# Patient Record
Sex: MALE | Race: WHITE | NOT HISPANIC OR LATINO | Employment: OTHER | URBAN - METROPOLITAN AREA
[De-identification: names, ages, dates, MRNs, and addresses within clinical notes are randomized per-mention and may not be internally consistent; named-entity substitution may affect disease eponyms.]

---

## 2017-01-12 ENCOUNTER — OFFICE VISIT (OUTPATIENT)
Dept: INTERNAL MEDICINE | Facility: CLINIC | Age: 77
End: 2017-01-12
Payer: MEDICARE

## 2017-01-12 VITALS
HEIGHT: 70 IN | HEART RATE: 57 BPM | OXYGEN SATURATION: 98 % | WEIGHT: 204.13 LBS | TEMPERATURE: 96 F | SYSTOLIC BLOOD PRESSURE: 120 MMHG | BODY MASS INDEX: 29.22 KG/M2 | DIASTOLIC BLOOD PRESSURE: 70 MMHG

## 2017-01-12 DIAGNOSIS — I10 ESSENTIAL HYPERTENSION: ICD-10-CM

## 2017-01-12 DIAGNOSIS — G89.29 CHRONIC RIGHT-SIDED LOW BACK PAIN WITH RIGHT-SIDED SCIATICA: Primary | ICD-10-CM

## 2017-01-12 DIAGNOSIS — M54.41 CHRONIC RIGHT-SIDED LOW BACK PAIN WITH RIGHT-SIDED SCIATICA: Primary | ICD-10-CM

## 2017-01-12 PROCEDURE — 99999 PR PBB SHADOW E&M-EST. PATIENT-LVL III: CPT | Mod: PBBFAC,,, | Performed by: FAMILY MEDICINE

## 2017-01-12 PROCEDURE — 99213 OFFICE O/P EST LOW 20 MIN: CPT | Mod: PBBFAC | Performed by: FAMILY MEDICINE

## 2017-01-12 PROCEDURE — 99214 OFFICE O/P EST MOD 30 MIN: CPT | Mod: S$PBB,,, | Performed by: FAMILY MEDICINE

## 2017-01-12 NOTE — PROGRESS NOTES
Subjective:       Patient ID: Aston Avendano is a 76 y.o. male.    Chief Complaint: Annual Exam    HPI Comments: Patient presents to clinic today reporting problems with chronic back pain. He is upset that I did not refill his request for flexeril, which he eventually received from Dr. Simpson. He reports he was seeing Dr. Hanson, but had to wait too long for his last appointment and does not plan to go back. He reports having 2+ surgeries on his back with no relief. He was also upset today that I did not see him until nearly 8:20 am when his appointment was scheduled for 7:40 am. (Patient was checked in for 7:47 am, nursing staff started rooming process by at least 8:02 (that is the time vitals were entered) and I was in the room seeing him at approximately 8:20 am.) Patient is otherwise without concerns today.      Review of Systems   Constitutional: Negative for chills, fatigue, fever and unexpected weight change.   HENT: Negative for congestion, dental problem, ear pain, hearing loss, rhinorrhea and trouble swallowing.    Eyes: Negative for pain and visual disturbance.   Respiratory: Negative for cough and shortness of breath.    Cardiovascular: Negative for chest pain, palpitations and leg swelling.   Gastrointestinal: Negative for abdominal distention, abdominal pain, blood in stool, constipation, diarrhea, nausea and vomiting.   Genitourinary: Negative for difficulty urinating, scrotal swelling and testicular pain.   Musculoskeletal: Positive for back pain. Negative for arthralgias and myalgias.   Skin: Negative for rash.   Neurological: Negative for dizziness, weakness, numbness and headaches.   Hematological: Negative for adenopathy. Does not bruise/bleed easily.   Psychiatric/Behavioral: Positive for sleep disturbance. Negative for dysphoric mood. The patient is not nervous/anxious.        Objective:      Physical Exam   Constitutional: He is oriented to person, place, and time. He appears well-developed  and well-nourished. No distress.   HENT:   Head: Normocephalic and atraumatic.   Eyes: Conjunctivae and EOM are normal. Pupils are equal, round, and reactive to light. No scleral icterus.   Pulmonary/Chest: Effort normal.   Neurological: He is alert and oriented to person, place, and time. No cranial nerve deficit. Gait normal.   Psychiatric: He has a normal mood and affect.   Vitals reviewed.      Assessment:       1. Chronic right-sided low back pain with right-sided sciatica    2. Essential hypertension        Plan:   Aston MERINO was seen today for annual exam.    Diagnoses and all orders for this visit:    Chronic right-sided low back pain with right-sided sciatica  -     Ambulatory Referral to Physical Medicine Rehab    Essential hypertension  Comments:  controlled, followed by Cardiology  Orders:  -     CBC auto differential; Standing  -     TSH; Standing    - Health Maintenance reviewed/updated.

## 2017-01-12 NOTE — MR AVS SNAPSHOT
[facility]  [address] [address] [address]  Phone: [phone]  Fax: [phone]                  Aston Avendano   2017 7:40 AM   Office Visit    Description:  Male : 1940   Provider:  Quita Nielsen MD   [facility]           Reason for Visit     Annual Exam           Diagnoses this Visit        Comments    Chronic right-sided low back pain with right-sided sciatica    -  Primary            To Do List           Future Appointments        Provider Department Dept Phone    2017 8:00 AM HOME MONITOR DEVICE CHECK [facility] - Arrhythmia Procedures [phone]    2018 8:40 AM Quita Nielsen MD [facility] [phone]      Goals (5 Years of Data)     None      Follow-Up and Disposition     Return in about 1 year (around 2018), or if symptoms worsen or fail to improve, for annual wellness exam.      [facility] On Call     [facility] On Call Nurse Care Line -  Assistance  Registered nurses in the [facility] On Call Center provide clinical advisement, health education, appointment booking, and other advisory services.  Call for this free service at [phone].             Medications           Message regarding Medications     Verify the changes and/or additions to your medication regime listed below are the same as discussed with your clinician today.  If any of these changes or additions are incorrect, please notify your healthcare provider.             Verify that the below list of medications is an accurate representation of the medications you are currently taking.  If none reported, the list may be blank. If incorrect, please contact your healthcare provider. Carry this list with you in case of emergency.           Current Medications     aspirin (ECOTRIN) 81 MG EC tablet Take by mouth. 1 Tablet, Delayed Release (E.C.) Oral Every day    cevimeline (EVOXAC) 30 mg capsule Take 1 capsule (30 mg total)  "by mouth 3 (three) times daily.    chlorthalidone (HYGROTEN) 25 MG Tab TAKE 1 TABLET ONE DAY AND 1/2 TABLET THE NEXT DAY AS DIRECTED    cyclobenzaprine (FLEXERIL) 10 MG tablet Take 1 tablet (10 mg total) by mouth 3 (three) times daily as needed for Muscle spasms.    diazepam (VALIUM) 5 MG tablet TAKE 1 TABLET EVERY 12 HOURS AS NEEDED SPASMS    etodolac (LODINE) 500 MG tablet Take 1 tablet (500 mg total) by mouth 2 (two) times daily with meals.    finasteride (PROSCAR) 5 mg tablet Take 5 mg by mouth once daily.     fish oil-omega-3 fatty acids 300-1,000 mg capsule Take 2 g by mouth once daily.    losartan (COZAAR) 100 MG tablet Take 0.5 tablets (50 mg total) by mouth once daily.    methocarbamol (ROBAXIN) 750 MG Tab Take 500 mg by mouth 4 (four) times daily.    sildenafil (VIAGRA) 100 MG tablet Take 1 tablet (100 mg total) by mouth daily as needed for Erectile Dysfunction.    simvastatin (ZOCOR) 20 MG tablet Take 0.5 tablets (10 mg total) by mouth once daily.    tamsulosin (FLOMAX) 0.4 mg Cp24 Take 0.4 mg by mouth once daily.     triamcinolone acetonide 0.1% (KENALOG) 0.1 % ointment     VESICARE 10 mg tablet Take 10 mg by mouth once daily.    zolpidem (AMBIEN) 5 MG Tab     gabapentin (NEURONTIN) 600 MG tablet Take 1 tablet (600 mg total) by mouth 2 (two) times daily.    ranitidine (ZANTAC) 300 MG tablet Take 1 tablet (300 mg total) by mouth every evening.    terbinafine HCl (LAMISIL) 1 % cream Apply to affected area twice a day until rash has resolved.           Clinical Reference Information           Vital Signs - Last Recorded  Most recent update: 1/12/2017  8:04 AM by Kimberly Zambrano    BP Pulse Temp Ht Wt SpO2    120/70 (BP Location: Right arm, Patient Position: Sitting) (!) 57 96 °F (35.6 °C) 5' 10" (1.778 m) 92.6 kg (204 lb 2.3 oz) 98%    BMI                29.29 kg/m2          Blood Pressure          Most Recent Value    BP  120/70      Allergies as of 1/12/2017     Codeine      Immunizations Administered on " Date of Encounter - 1/12/2017     None      Orders Placed During Today's Visit      Normal Orders This Visit    Ambulatory Referral to Physical Medicine Rehab       Jose Martinsmarleny Sign-Up     Activating your [facility] account is as easy as 1-2-3!     1) Visit [email or web address], select Sign Up Now, enter this activation code and your date of birth, then select Next.  [email or web address]  Expires: 1/19/2017  8:39 AM      2) Create a username and password to use when you visit [facility] in the future and select a security question in case you lose your password and select Next.    3) Enter your e-mail address and click Sign Up!    Additional Information  If you have questions, please e-mail [email or web address] or call [phone] to talk to our [facility] staff. Remember, [facility] is NOT to be used for urgent needs. For medical emergencies, dial 911.

## 2017-01-18 ENCOUNTER — TELEPHONE (OUTPATIENT)
Dept: INTERNAL MEDICINE | Facility: CLINIC | Age: 77
End: 2017-01-18

## 2017-01-18 NOTE — TELEPHONE ENCOUNTER
----- Message from Ary Jonas sent at 1/18/2017  1:03 PM CST -----  Call pt at [phone]//pt say call him concerning the neurology and also he has information that he want to give you//wiliamks ht

## 2017-01-19 RX ORDER — CHLORTHALIDONE 25 MG/1
TABLET ORAL
Qty: 30 TABLET | Refills: 11 | Status: SHIPPED | OUTPATIENT
Start: 2017-01-19 | End: 2017-06-01 | Stop reason: SDUPTHER

## 2017-01-24 ENCOUNTER — OFFICE VISIT (OUTPATIENT)
Dept: PAIN MEDICINE | Facility: CLINIC | Age: 77
End: 2017-01-24
Payer: MEDICARE

## 2017-01-24 VITALS
HEART RATE: 57 BPM | HEIGHT: 70 IN | RESPIRATION RATE: 16 BRPM | WEIGHT: 204 LBS | DIASTOLIC BLOOD PRESSURE: 72 MMHG | BODY MASS INDEX: 29.2 KG/M2 | SYSTOLIC BLOOD PRESSURE: 146 MMHG

## 2017-01-24 DIAGNOSIS — M96.1 FAILED BACK SYNDROME OF LUMBAR SPINE: Primary | ICD-10-CM

## 2017-01-24 PROCEDURE — 99213 OFFICE O/P EST LOW 20 MIN: CPT | Mod: PBBFAC | Performed by: ANESTHESIOLOGY

## 2017-01-24 PROCEDURE — 99999 PR PBB SHADOW E&M-EST. PATIENT-LVL III: CPT | Mod: PBBFAC,,, | Performed by: ANESTHESIOLOGY

## 2017-01-24 PROCEDURE — 99203 OFFICE O/P NEW LOW 30 MIN: CPT | Mod: S$PBB,,, | Performed by: ANESTHESIOLOGY

## 2017-01-24 NOTE — PROGRESS NOTES
"Chief Pain Complaint:  Low Back Pain, Bilateral Leg Pain    History of Present Illness:  This patient is a 77 y.o. male who presents today complaining of the above noted pain/s. The patient describes the pain as follows.    - duration of pain: 8 years   - timing: constant   - character: aching, numbing  - radiating, dermatomal: extends into bilateral lower extremities, perhaps along L5 at times  - antecedent trauma, prior spinal surgery: no prior trauma, prior lumbar surgery x 3 (Dr. Hanson)  - pertinent negatives: No fever, No chills, No weight loss, No bladder dysfunction, No bowel dysfunction, No saddle anesthesia  - pertinent positives: generalized nonspecific Lower Extremity weakness bilaterally    - medications, other therapies tried (physical therapy, injections):     >> gabapentin, flexeril, Norco, percocet, tramadol    >> Has previously undergone Physical Therapy    >> Has previously undergone spinal injection/s      Imaging / Labs / Studies (reviewed on 1/24/2017): none for review      Review of Systems:  CONSTITUTIONAL: patient denies any fever, chills, or weight loss  SKIN: patient denies any rash or itching  RESPIRATORY: patient denies having any shortness of breath  GASTROINTESTINAL: patient reports constipation  GENITOURINARY: patient denies having any abnormal bladder function    MUSCULOSKELETAL:  - patient complains of the above noted pain/s (see chief pain complaint)    NEUROLOGICAL:   - pain as above  - strength in Lower extremities is decreased, BILATERALLY  - sensation in Lower extremities is abnormal, BILATERALLY  - patient denies any loss of bowel or bladder control      PSYCHIATRIC: patient denies any change in mood      Physical Exam:  Visit Vitals    BP (!) 146/72 (BP Location: Right arm, Patient Position: Sitting, BP Method: Automatic)    Pulse (!) 57    Resp 16    Ht 5' 10" (1.778 m)    Wt 92.5 kg (204 lb)    BMI 29.27 kg/m2    (reviewed on 1/24/2017)  General: alert and oriented, " in no apparent distress  Gait: antalgic gait  Skin: No rashes, No discoloration, No obvious lesions  HEENT: EOMI  Respiratory: respirations nonlabored       Psych:  Mood and affect is appropriate      Assessment:  Failed Back Surgery Syndrome      Plan:  Patient presents complaining of chronic low back and extremity pain.  Patient has had this pain for years.  Patient has undergone 3 lumbar spine surgeries.  Patient has followed with Dr. Hanson.  Patient has been exposed to pain medication, physical therapy, and spinal injections with marginal results.  I discussed treatment options and inform the patient that I unfortunately do not offer any unique treatments, nothing that he has not already been exposed to.  I will request records from Dr. Hanson.  Patient can follow up with me as needed.  Imaging / studies reviewed, detailed above.  I discussed in detail the risks, benefits, and alternatives to any and all potential treatment options.  All questions and concerns were fully addressed today in clinic.      >>Pain Disability Index:  1/24/2017 :: 79    >>Opioid Risk Tool:  1/24/2017 :: 0

## 2017-01-24 NOTE — LETTER
January 24, 2017      Quita Nielsen MD  [address] [place] [address] [address] [address]           [facility]  [address] [place] [address] [address] [place] [address]  Phone: [phone]  Fax: [phone]          Patient: Aston Avendano   MR Number: [id number]   YOB: 1940   Date of Visit: 1/24/2017       Dear Dr. Quita Nielsen:    Thank you for referring Aston Avendano to me for evaluation. Attached you will find relevant portions of my assessment and plan of care.    If you have questions, please do not hesitate to call me. I look forward to following Aston Avendano along with you.    Sincerely,    Rodrick Snow MD    Enclosure  CC:  No Recipients    If you would like to receive this communication electronically, please contact [email or web address] or [phone] to request more information on [employer] access.    For providers and/or their staff who would like to refer a patient to [facility], please contact us through our one-stop-shop provider referral line, [facility], at [phone].    If you feel you have received this communication in error or would no longer like to receive these types of communications, please e-mail [email or web address]

## 2017-01-30 ENCOUNTER — CLINICAL SUPPORT (OUTPATIENT)
Dept: CARDIOLOGY | Facility: CLINIC | Age: 77
End: 2017-01-30
Payer: MEDICARE

## 2017-01-30 DIAGNOSIS — I49.5 SINUS NODE DYSFUNCTION: ICD-10-CM

## 2017-01-30 DIAGNOSIS — R55 SYNCOPE AND COLLAPSE: ICD-10-CM

## 2017-01-30 PROCEDURE — 93296 REM INTERROG EVL PM/IDS: CPT | Mod: PBBFAC,PO | Performed by: INTERNAL MEDICINE

## 2017-01-30 PROCEDURE — 93294 REM INTERROG EVL PM/LDLS PM: CPT | Mod: ,,, | Performed by: INTERNAL MEDICINE

## 2017-01-31 ENCOUNTER — TELEPHONE (OUTPATIENT)
Dept: CARDIOLOGY | Facility: CLINIC | Age: 77
End: 2017-01-31

## 2017-01-31 NOTE — TELEPHONE ENCOUNTER
----- Message from Elvia Sanches sent at 1/31/2017  8:57 AM CST -----  Contact: pt  Pt would like to change his appointment to the [facility] also he would like someone give kalyan call..[phone] (home)

## 2017-01-31 NOTE — TELEPHONE ENCOUNTER
Pt states he just found reminder letter about remote check for Jan 30, 2017.  Wants to know if he could r/s that appt for [facility].  Educated pt about remote monitoring and that he did not have to come to clinic for those appts.  Advised him that transmission came through, no problems, awaiting confirmation by cardiologist.  Pt verbalized understanding.

## 2017-02-13 ENCOUNTER — OFFICE VISIT (OUTPATIENT)
Dept: OPHTHALMOLOGY | Facility: CLINIC | Age: 77
End: 2017-02-13
Payer: MEDICARE

## 2017-02-13 DIAGNOSIS — H02.056 TRICHIASIS OF EYELID, LEFT: ICD-10-CM

## 2017-02-13 DIAGNOSIS — H02.836 DERMATOCHALASIS OF BOTH EYELIDS: ICD-10-CM

## 2017-02-13 DIAGNOSIS — H02.833 DERMATOCHALASIS OF BOTH EYELIDS: ICD-10-CM

## 2017-02-13 DIAGNOSIS — H57.819 BROW PTOSIS: ICD-10-CM

## 2017-02-13 DIAGNOSIS — H04.123 DRY EYE SYNDROME, BILATERAL: ICD-10-CM

## 2017-02-13 DIAGNOSIS — H40.1131 PRIMARY OPEN ANGLE GLAUCOMA OF BOTH EYES, MILD STAGE: ICD-10-CM

## 2017-02-13 DIAGNOSIS — H02.053: Primary | ICD-10-CM

## 2017-02-13 DIAGNOSIS — H16.9 KERATITIS, BILATERAL: ICD-10-CM

## 2017-02-13 DIAGNOSIS — Z98.890 HISTORY OF RADIAL KERATOTOMY: ICD-10-CM

## 2017-02-13 PROCEDURE — 92012 INTRM OPH EXAM EST PATIENT: CPT | Mod: S$PBB,,, | Performed by: OPTOMETRIST

## 2017-02-13 PROCEDURE — 99999 PR PBB SHADOW E&M-EST. PATIENT-LVL II: CPT | Mod: PBBFAC,,, | Performed by: OPTOMETRIST

## 2017-02-13 PROCEDURE — 99212 OFFICE O/P EST SF 10 MIN: CPT | Mod: PBBFAC | Performed by: OPTOMETRIST

## 2017-02-13 NOTE — PROGRESS NOTES
HPI     Since Friday seems like a lash is irritating right eye.  Patient has used   a bottle and a half of Systane ultra eye drops  since Friday.       Last edited by Jacklyn Srivastava on 2/13/2017  1:58 PM.         Assessment /Plan     For exam results, see Encounter Report.    Trichiasis of eyelid, right    Trichiasis of eyelid, left    Brow ptosis    Dermatochalasis of both eyelids    Dry eye syndrome, bilateral    Keratitis, bilateral    Primary open angle glaucoma of both eyes, mild stage    History of radial keratotomy      Epilated lashes without incident    RTC prn, sees Dr Yee for glaucoma care

## 2017-03-06 DIAGNOSIS — I10 ESSENTIAL HYPERTENSION: ICD-10-CM

## 2017-03-06 RX ORDER — LOSARTAN POTASSIUM 100 MG/1
TABLET ORAL
Qty: 90 TABLET | Refills: 3 | Status: SHIPPED | OUTPATIENT
Start: 2017-03-06 | End: 2018-12-10 | Stop reason: SDUPTHER

## 2017-04-17 ENCOUNTER — OFFICE VISIT (OUTPATIENT)
Dept: OPHTHALMOLOGY | Facility: CLINIC | Age: 77
End: 2017-04-17
Payer: MEDICARE

## 2017-04-17 DIAGNOSIS — H02.059 TRICHIASIS OF EYELID WITHOUT ENTROPION, UNSPECIFIED LATERALITY: Primary | ICD-10-CM

## 2017-04-17 PROCEDURE — 67820 REVISE EYELASHES: CPT | Mod: PBBFAC | Performed by: OPTOMETRIST

## 2017-04-17 PROCEDURE — 99212 OFFICE O/P EST SF 10 MIN: CPT | Mod: PBBFAC | Performed by: OPTOMETRIST

## 2017-04-17 PROCEDURE — 67820 REVISE EYELASHES: CPT | Mod: S$PBB,RT,, | Performed by: OPTOMETRIST

## 2017-04-17 PROCEDURE — 92012 INTRM OPH EXAM EST PATIENT: CPT | Mod: 25,S$PBB,, | Performed by: OPTOMETRIST

## 2017-04-17 PROCEDURE — 99999 PR PBB SHADOW E&M-EST. PATIENT-LVL II: CPT | Mod: PBBFAC,,, | Performed by: OPTOMETRIST

## 2017-05-01 ENCOUNTER — CLINICAL SUPPORT (OUTPATIENT)
Dept: CARDIOLOGY | Facility: CLINIC | Age: 77
End: 2017-05-01
Payer: MEDICARE

## 2017-05-01 DIAGNOSIS — R55 SYNCOPE AND COLLAPSE: ICD-10-CM

## 2017-05-01 DIAGNOSIS — I49.5 SINUS NODE DYSFUNCTION: ICD-10-CM

## 2017-05-01 PROCEDURE — 93296 REM INTERROG EVL PM/IDS: CPT | Mod: PBBFAC,PO | Performed by: INTERNAL MEDICINE

## 2017-05-01 PROCEDURE — 93294 REM INTERROG EVL PM/LDLS PM: CPT | Mod: ,,, | Performed by: INTERNAL MEDICINE

## 2017-05-29 ENCOUNTER — OFFICE VISIT (OUTPATIENT)
Dept: OPHTHALMOLOGY | Facility: CLINIC | Age: 77
End: 2017-05-29
Payer: MEDICARE

## 2017-05-29 DIAGNOSIS — H02.059 TRICHIASIS OF EYELID WITHOUT ENTROPION, UNSPECIFIED LATERALITY: Primary | ICD-10-CM

## 2017-05-29 PROCEDURE — 99212 OFFICE O/P EST SF 10 MIN: CPT | Mod: PBBFAC | Performed by: OPTOMETRIST

## 2017-05-29 PROCEDURE — 99999 PR PBB SHADOW E&M-EST. PATIENT-LVL II: CPT | Mod: PBBFAC,,, | Performed by: OPTOMETRIST

## 2017-05-29 PROCEDURE — 67820 REVISE EYELASHES: CPT | Mod: E3,S$PBB,, | Performed by: OPTOMETRIST

## 2017-05-29 PROCEDURE — 67820 REVISE EYELASHES: CPT | Mod: PBBFAC | Performed by: OPTOMETRIST

## 2017-05-29 PROCEDURE — 92012 INTRM OPH EXAM EST PATIENT: CPT | Mod: 25,S$PBB,, | Performed by: OPTOMETRIST

## 2017-05-29 NOTE — PROGRESS NOTES
HPI     Patient states he need lashes pull both eyes. Lashes gave bother patient   all weekend long.    Last edited by Jacklyn Srivastava on 5/29/2017  1:47 PM. (History)            Assessment /Plan     For exam results, see Encounter Report.    Trichiasis of eyelid without entropion, unspecified laterality      Epilated lash RUL    RTC prn

## 2017-06-01 ENCOUNTER — OFFICE VISIT (OUTPATIENT)
Dept: CARDIOLOGY | Facility: CLINIC | Age: 77
End: 2017-06-01
Payer: MEDICARE

## 2017-06-01 VITALS
HEIGHT: 70 IN | SYSTOLIC BLOOD PRESSURE: 118 MMHG | BODY MASS INDEX: 29.22 KG/M2 | DIASTOLIC BLOOD PRESSURE: 66 MMHG | HEART RATE: 56 BPM | WEIGHT: 204.13 LBS

## 2017-06-01 DIAGNOSIS — I65.23 BILATERAL CAROTID ARTERY STENOSIS: ICD-10-CM

## 2017-06-01 DIAGNOSIS — Z95.0 CARDIAC PACEMAKER: Chronic | ICD-10-CM

## 2017-06-01 DIAGNOSIS — I70.0 ABDOMINAL AORTIC ATHEROSCLEROSIS: Chronic | ICD-10-CM

## 2017-06-01 DIAGNOSIS — K21.9 GASTROESOPHAGEAL REFLUX DISEASE WITHOUT ESOPHAGITIS: Chronic | ICD-10-CM

## 2017-06-01 DIAGNOSIS — I10 HTN (HYPERTENSION), BENIGN: Primary | Chronic | ICD-10-CM

## 2017-06-01 DIAGNOSIS — E78.5 HYPERLIPIDEMIA, UNSPECIFIED HYPERLIPIDEMIA TYPE: Chronic | ICD-10-CM

## 2017-06-01 DIAGNOSIS — M54.9 CHRONIC BACK PAIN GREATER THAN 3 MONTHS DURATION: Chronic | ICD-10-CM

## 2017-06-01 DIAGNOSIS — G89.29 CHRONIC BACK PAIN GREATER THAN 3 MONTHS DURATION: Chronic | ICD-10-CM

## 2017-06-01 PROCEDURE — 99999 PR PBB SHADOW E&M-EST. PATIENT-LVL III: CPT | Mod: PBBFAC,,, | Performed by: INTERNAL MEDICINE

## 2017-06-01 PROCEDURE — 99213 OFFICE O/P EST LOW 20 MIN: CPT | Mod: S$PBB,,, | Performed by: INTERNAL MEDICINE

## 2017-06-01 PROCEDURE — 99213 OFFICE O/P EST LOW 20 MIN: CPT | Mod: PBBFAC | Performed by: INTERNAL MEDICINE

## 2017-06-01 RX ORDER — IBUPROFEN 800 MG/1
800 TABLET ORAL EVERY 8 HOURS
Refills: 1 | COMMUNITY
Start: 2017-03-06

## 2017-06-01 RX ORDER — TRAMADOL HYDROCHLORIDE 50 MG/1
50 TABLET ORAL
COMMUNITY
End: 2018-01-25

## 2017-06-01 RX ORDER — MYCOPHENOLATE MOFETIL 500 MG/1
TABLET ORAL
COMMUNITY
Start: 2017-02-14 | End: 2019-05-02

## 2017-06-01 RX ORDER — VARDENAFIL HYDROCHLORIDE 20 MG/1
20 TABLET ORAL
COMMUNITY
Start: 2017-02-01 | End: 2018-02-01

## 2017-06-01 RX ORDER — OXYBUTYNIN CHLORIDE 10 MG/1
TABLET, EXTENDED RELEASE ORAL
Refills: 11 | COMMUNITY
Start: 2017-03-28 | End: 2018-08-27

## 2017-06-01 RX ORDER — OXYCODONE AND ACETAMINOPHEN 7.5; 325 MG/1; MG/1
1 TABLET ORAL EVERY 8 HOURS PRN
Refills: 0 | COMMUNITY
Start: 2017-04-27 | End: 2019-06-25

## 2017-06-01 RX ORDER — DAPSONE 25 MG/1
TABLET ORAL
COMMUNITY
Start: 2017-04-25 | End: 2019-05-02

## 2017-06-01 NOTE — PROGRESS NOTES
Subjective:   Patient ID:  Aston Avendano is a 77 y.o. male who presents for follow up of Hypertension (Patient presents to clinic today for 6 month f/u.)      HPI  A 78 yo male with h/o pacer htn is here for f/u he is having chronic back pain needing another surgery otherwise he has no change  In status he is limited in his mobility.he has fallen. His last surgery was 1 year ago he had no complications.  Past Medical History:   Diagnosis Date    Arthritis     Cardiac syncope 2011    s/p pacemaker     Colon cancer screening 12/5/2016    Glaucoma     History of carotid artery stenosis 9/26/2013    No stenosis on scan done 11/2014. S/p CEA        Past Surgical History:   Procedure Laterality Date    CARDIAC PACEMAKER PLACEMENT      CARDIAC PACEMAKER PLACEMENT      CAROTID ENDARTERECTOMY      CATARACT EXTRACTION W/  INTRAOCULAR LENS IMPLANT  OD 1/5/12    CATARACT EXTRACTION W/  INTRAOCULAR LENS IMPLANT  OS date unknown    COLONOSCOPY N/A 12/5/2016    Procedure: COLONOSCOPY;  Surgeon: Kary Kohler MD;  Location: [facility];  Service: Endoscopy;  Laterality: N/A;    LUMBAR FUSION  2013    RHIZOTOMY W/ RADIOFREQUENCY ABLATION  2010       Social History   Substance Use Topics    Smoking status: Former Smoker     Packs/day: 0.25     Years: 50.00     Quit date: 5/1/2006    Smokeless tobacco: Never Used    Alcohol use Yes      Comment: OCC       Family History   Problem Relation Age of Onset    Hypertension Mother     Hypertension Father        Current Outpatient Prescriptions   Medication Sig    aspirin (ECOTRIN) 81 MG EC tablet Take by mouth. 1 Tablet, Delayed Release (E.C.) Oral Every day    chlorthalidone (HYGROTEN) 25 MG Tab TAKE 1 TABLET ONE DAY AND 1/2 TABLET THE NEXT DAY AS DIRECTED    dapsone 25 MG Tab 1 po bid    diazepam (VALIUM) 5 MG tablet TAKE 1 TABLET EVERY 12 HOURS AS NEEDED SPASMS    etodolac (LODINE) 500 MG tablet Take 1 tablet (500 mg total) by mouth 2 (two) times daily with meals.     finasteride (PROSCAR) 5 mg tablet Take 5 mg by mouth once daily.     fish oil-omega-3 fatty acids 300-1,000 mg capsule Take 2 g by mouth once daily.    gabapentin (NEURONTIN) 600 MG tablet Take 1 tablet (600 mg total) by mouth 2 (two) times daily.    ibuprofen (ADVIL,MOTRIN) 800 MG tablet Take 800 mg by mouth every 8 (eight) hours.    losartan (COZAAR) 100 MG tablet TAKE 1 TABLET (100 MG TOTAL) BY MOUTH ONCE DAILY.    mycophenolate (CELLCEPT) 500 mg Tab 3 in the AM and 3 in the PM    oxybutynin (DITROPAN-XL) 10 MG 24 hr tablet TAKE 1 TABLET (10 MG TOTAL) BY MOUTH DAILY.    oxycodone-acetaminophen (PERCOCET) 7.5-325 mg per tablet Take 1 tablet by mouth every 8 (eight) hours as needed.    ranitidine (ZANTAC) 300 MG tablet Take 1 tablet (300 mg total) by mouth every evening.    simvastatin (ZOCOR) 20 MG tablet Take 0.5 tablets (10 mg total) by mouth once daily.    tamsulosin (FLOMAX) 0.4 mg Cp24 Take 0.4 mg by mouth once daily.     vardenafil (LEVITRA) 20 MG tablet Take 20 mg by mouth.    VESICARE 10 mg tablet Take 10 mg by mouth once daily.    zolpidem (AMBIEN) 5 MG Tab     cyclobenzaprine (FLEXERIL) 10 MG tablet Take 1 tablet (10 mg total) by mouth 3 (three) times daily as needed for Muscle spasms.    methocarbamol (ROBAXIN) 750 MG Tab Take 500 mg by mouth 4 (four) times daily.    sildenafil (VIAGRA) 100 MG tablet Take 1 tablet (100 mg total) by mouth daily as needed for Erectile Dysfunction.    tramadol (ULTRAM) 50 mg tablet Take 50 mg by mouth.     No current facility-administered medications for this visit.      Current Outpatient Prescriptions on File Prior to Visit   Medication Sig    aspirin (ECOTRIN) 81 MG EC tablet Take by mouth. 1 Tablet, Delayed Release (E.C.) Oral Every day    chlorthalidone (HYGROTEN) 25 MG Tab TAKE 1 TABLET ONE DAY AND 1/2 TABLET THE NEXT DAY AS DIRECTED    diazepam (VALIUM) 5 MG tablet TAKE 1 TABLET EVERY 12 HOURS AS NEEDED SPASMS    etodolac (LODINE) 500 MG tablet  Take 1 tablet (500 mg total) by mouth 2 (two) times daily with meals.    finasteride (PROSCAR) 5 mg tablet Take 5 mg by mouth once daily.     fish oil-omega-3 fatty acids 300-1,000 mg capsule Take 2 g by mouth once daily.    gabapentin (NEURONTIN) 600 MG tablet Take 1 tablet (600 mg total) by mouth 2 (two) times daily.    losartan (COZAAR) 100 MG tablet TAKE 1 TABLET (100 MG TOTAL) BY MOUTH ONCE DAILY.    ranitidine (ZANTAC) 300 MG tablet Take 1 tablet (300 mg total) by mouth every evening.    simvastatin (ZOCOR) 20 MG tablet Take 0.5 tablets (10 mg total) by mouth once daily.    tamsulosin (FLOMAX) 0.4 mg Cp24 Take 0.4 mg by mouth once daily.     VESICARE 10 mg tablet Take 10 mg by mouth once daily.    zolpidem (AMBIEN) 5 MG Tab     [DISCONTINUED] chlorthalidone (HYGROTEN) 25 MG Tab TAKE 1 TABLET ONE DAY AND 1/2 TABLET THE NEXT DAY AS DIRECTED    cyclobenzaprine (FLEXERIL) 10 MG tablet Take 1 tablet (10 mg total) by mouth 3 (three) times daily as needed for Muscle spasms.    methocarbamol (ROBAXIN) 750 MG Tab Take 500 mg by mouth 4 (four) times daily.    sildenafil (VIAGRA) 100 MG tablet Take 1 tablet (100 mg total) by mouth daily as needed for Erectile Dysfunction.    [DISCONTINUED] cevimeline (EVOXAC) 30 mg capsule Take 1 capsule (30 mg total) by mouth 3 (three) times daily.    [DISCONTINUED] terbinafine HCl (LAMISIL) 1 % cream Apply to affected area twice a day until rash has resolved.    [DISCONTINUED] triamcinolone acetonide 0.1% (KENALOG) 0.1 % ointment      No current facility-administered medications on file prior to visit.      Review of patient's allergies indicates:   Allergen Reactions    Codeine Itching    Oxycodone Itching     Pt states it makes his nose itch and he does not like it       ROS  Constitution: Negative for weakness and malaise/fatigue.   HENT: Negative for headaches.   Eyes: Positive for visual disturbance. Negative for blurred vision.   Cardiovascular: Negative for  "chest pain, claudication, cyanosis, dyspnea on exertion, irregular heartbeat, leg swelling, near-syncope, orthopnea, palpitations and paroxysmal nocturnal dyspnea.   Respiratory: Negative for cough, hemoptysis and shortness of breath.   Hematologic/Lymphatic: Negative for bleeding problem. Does not bruise/bleed easily.   Skin: Negative for dry skin and itching.   Musculoskeletal: Positive for arthritis, back pain and joint pain. Negative for falls, muscle weakness and myalgias.   Gastrointestinal: Negative for abdominal pain, diarrhea, heartburn, hematemesis, hematochezia and melena.   Genitourinary: Positive for nocturia. Negative for flank pain and hematuria.   Neurological: Positive for loss of balance, numbness and paresthesias. Negative for dizziness, focal weakness, light-headedness and seizures.   Psychiatric/Behavioral: Negative for altered mental status and memory loss. The patient is not nervous/anxious.   Allergic/Immunologic: Negative for hives.   Objective:   Physical Exam  Vitals:    06/01/17 0915   BP: 118/66   Pulse: (!) 56   Weight: 92.6 kg (204 lb 2.3 oz)   Height: 5' 10" (1.778 m)   Constitutional: He is oriented to person, place, and time. He appears well-developed and well-nourished. No distress.   HENT:   Head: Normocephalic and atraumatic.   Eyes: EOM are normal. Pupils are equal, round, and reactive to light. Left eye exhibits no discharge.   Neck: Normal range of motion. Neck supple. No JVD present.   Scar cea well healed.   Cardiovascular: Normal rate, regular rhythm, normal heart sounds and intact distal pulses. Exam reveals no gallop and no friction rub.   No murmur heard.  Pulmonary/Chest: Effort normal and breath sounds normal. No respiratory distress. He has no wheezes. He has no rales. He exhibits no tenderness.   Pacer site well healed.   Abdominal: Soft. Bowel sounds are normal. He exhibits no distension. There is no tenderness.   Musculoskeletal: Normal range of motion. He exhibits " no edema.   Varicose veins bilateral.   Neurological: He is alert and oriented to person, place, and time. No cranial nerve deficit.   Skin: Skin is warm and dry. No rash noted. He is not diaphoretic. No erythema.   Psychiatric: He has a normal mood and affect. His behavior is normal.  Lab Results   Component Value Date    CHOL 132 05/01/2015    CHOL 142 04/24/2014    CHOL 114 (L) 09/23/2013     Lab Results   Component Value Date    HDL 46 05/01/2015    HDL 42 04/24/2014    HDL 37 (L) 09/23/2013     Lab Results   Component Value Date    LDLCALC 76.2 05/01/2015    LDLCALC 85.6 04/24/2014    LDLCALC 63.8 09/23/2013     Lab Results   Component Value Date    TRIG 49 05/01/2015    TRIG 72 04/24/2014    TRIG 66 09/23/2013     Lab Results   Component Value Date    CHOLHDL 34.8 05/01/2015    CHOLHDL 29.6 04/24/2014    CHOLHDL 32.5 09/23/2013       Chemistry        Component Value Date/Time     12/30/2015 0912    K 4.0 12/30/2015 0912     12/30/2015 0912    CO2 27 12/30/2015 0912    BUN 13 12/30/2015 0912    CREATININE 1.0 12/30/2015 0912    GLU 86 12/30/2015 0912        Component Value Date/Time    CALCIUM 9.2 12/30/2015 0912    ALKPHOS 60 05/01/2015 0810    AST 20 05/01/2015 0810    ALT 26 05/01/2015 0810    BILITOT 0.5 05/01/2015 0810          Lab Results   Component Value Date    TSH 1.53 02/15/2011     Lab Results   Component Value Date    INR 1.1 07/22/2009     Lab Results   Component Value Date    WBC 5.74 12/30/2015    HGB 15.5 12/30/2015    HCT 44.2 12/30/2015    MCV 91 12/30/2015     (L) 12/30/2015     BMP  Sodium   Date Value Ref Range Status   12/30/2015 140 136 - 145 mmol/L Final     Potassium   Date Value Ref Range Status   12/30/2015 4.0 3.5 - 5.1 mmol/L Final     Chloride   Date Value Ref Range Status   12/30/2015 105 95 - 110 mmol/L Final     CO2   Date Value Ref Range Status   12/30/2015 27 23 - 29 mmol/L Final     BUN, Bld   Date Value Ref Range Status   12/30/2015 13 8 - 23 mg/dL Final      Creatinine   Date Value Ref Range Status   12/30/2015 1.0 0.5 - 1.4 mg/dL Final     Calcium   Date Value Ref Range Status   12/30/2015 9.2 8.7 - 10.5 mg/dL Final     Anion Gap   Date Value Ref Range Status   12/30/2015 8 8 - 16 mmol/L Final     eGFR if    Date Value Ref Range Status   12/30/2015 >60.0 >60 mL/min/1.73 m^2 Final     eGFR if non    Date Value Ref Range Status   12/30/2015 >60.0 >60 mL/min/1.73 m^2 Final     Comment:     Calculation used to obtain the estimated glomerular filtration  rate (eGFR) is the CKD-EPI equation. Since race is unknown   in our information system, the eGFR values for   -American and Non--American patients are given   for each creatinine result.       CrCl cannot be calculated (Patient's most recent sCr result is older than the maximum 14 days allowed.).    Assessment:     1. HTN (hypertension), benign    2. Hyperlipidemia, unspecified hyperlipidemia type    3. Cardiac pacemaker    4. Gastroesophageal reflux disease without esophagitis    5. Chronic back pain greater than 3 months duration    6. Abdominal aortic atherosclerosis - seen on AAA screening scan    7. Bilateral carotid artery stenosis      Stable clinically needing another surgery ok to proceed.  Plan:   Proceed with surgery at moderate risk.  F/u in 6 months.

## 2017-06-10 DIAGNOSIS — E78.5 DYSLIPIDEMIA, GOAL LDL BELOW 100: ICD-10-CM

## 2017-06-10 DIAGNOSIS — I70.0 ABDOMINAL AORTIC ATHEROSCLEROSIS: ICD-10-CM

## 2017-06-10 DIAGNOSIS — I65.29 STENOSIS OF CAROTID ARTERY: ICD-10-CM

## 2017-06-10 RX ORDER — SIMVASTATIN 20 MG/1
TABLET, FILM COATED ORAL
Qty: 45 TABLET | Refills: 1 | Status: SHIPPED | OUTPATIENT
Start: 2017-06-10 | End: 2019-07-29 | Stop reason: SDUPTHER

## 2017-06-12 ENCOUNTER — TELEPHONE (OUTPATIENT)
Dept: CARDIOLOGY | Facility: CLINIC | Age: 77
End: 2017-06-12

## 2017-06-12 NOTE — TELEPHONE ENCOUNTER
----- Message from Susanna Pittman sent at 6/12/2017  2:25 PM CDT -----  Contact: [facility]/Dr Adryan Tuttle   States he was just seen on 6/1 and he needs a surgery clearance. States he surgery in on 7/18. Please call [facility] at [phone]. Thank you

## 2017-07-14 ENCOUNTER — OFFICE VISIT (OUTPATIENT)
Dept: OPHTHALMOLOGY | Facility: CLINIC | Age: 77
End: 2017-07-14
Payer: MEDICARE

## 2017-07-14 DIAGNOSIS — H16.9 KERATITIS, BILATERAL: ICD-10-CM

## 2017-07-14 DIAGNOSIS — H02.059 TRICHIASIS OF EYELID WITHOUT ENTROPION, UNSPECIFIED LATERALITY: Primary | ICD-10-CM

## 2017-07-14 DIAGNOSIS — Z98.890 HISTORY OF RADIAL KERATOTOMY: ICD-10-CM

## 2017-07-14 PROCEDURE — 99999 PR PBB SHADOW E&M-EST. PATIENT-LVL II: CPT | Mod: PBBFAC,,, | Performed by: OPTOMETRIST

## 2017-07-14 PROCEDURE — 67820 REVISE EYELASHES: CPT | Mod: PBBFAC | Performed by: OPTOMETRIST

## 2017-07-14 PROCEDURE — 92012 INTRM OPH EXAM EST PATIENT: CPT | Mod: 25,S$PBB,, | Performed by: OPTOMETRIST

## 2017-07-14 PROCEDURE — 67820 REVISE EYELASHES: CPT | Mod: S$PBB,LT,, | Performed by: OPTOMETRIST

## 2017-07-14 PROCEDURE — 99212 OFFICE O/P EST SF 10 MIN: CPT | Mod: PBBFAC,25 | Performed by: OPTOMETRIST

## 2017-07-14 NOTE — PROGRESS NOTES
HPI     Lashes in left eye need to be pulled. Left eye pain today. Pain scale   7-8.  HX of Trichasis    Last edited by Chandana Gallegos, OD on 7/14/2017 10:42 AM. (History)            Assessment /Plan     For exam results, see Encounter Report.    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    Continue AT use prn    RTC per [facility] for glaucoma eval

## 2017-07-20 ENCOUNTER — OFFICE VISIT (OUTPATIENT)
Dept: OPHTHALMOLOGY | Facility: CLINIC | Age: 77
End: 2017-07-20
Payer: MEDICARE

## 2017-07-20 DIAGNOSIS — H18.30 CORNEAL EPITHELIAL DEFECT: Primary | ICD-10-CM

## 2017-07-20 PROCEDURE — 99212 OFFICE O/P EST SF 10 MIN: CPT | Mod: PBBFAC | Performed by: OPTOMETRIST

## 2017-07-20 PROCEDURE — 92012 INTRM OPH EXAM EST PATIENT: CPT | Mod: S$PBB,,, | Performed by: OPTOMETRIST

## 2017-07-20 PROCEDURE — 99999 PR PBB SHADOW E&M-EST. PATIENT-LVL II: CPT | Mod: PBBFAC,,, | Performed by: OPTOMETRIST

## 2017-07-20 RX ORDER — NEOMYCIN SULFATE, POLYMYXIN B SULFATE, AND DEXAMETHASONE 3.5; 10000; 1 MG/G; [USP'U]/G; MG/G
OINTMENT OPHTHALMIC EVERY 6 HOURS
Qty: 1 TUBE | Refills: 3 | Status: SHIPPED | OUTPATIENT
Start: 2017-07-20 | End: 2017-11-27

## 2017-07-20 NOTE — PROGRESS NOTES
HPI     Lashes in left eye need to be pulled. Left eye pain today. Pain scale 10   today   HX of Trichasis    Last edited by Jacklyn Srivastava on 7/20/2017  1:22 PM. (History)            Assessment /Plan     For exam results, see Encounter Report.    Corneal epithelial defect    Other orders  -     neomycin-polymyxin-dexamethasone (DEXACINE) 3.5 mg/g-10,000 unit/g-0.1 % Oint; Place into both eyes every 6 (six) hours. Please call for alternative if not in stock today.  Dispense: 1 Tube; Refill: 3      No trichiasis present       Will treat epi defect with maxitrol marcio     RTC prn worsens

## 2017-07-24 ENCOUNTER — OFFICE VISIT (OUTPATIENT)
Dept: OPHTHALMOLOGY | Facility: CLINIC | Age: 77
End: 2017-07-24
Payer: MEDICARE

## 2017-07-24 DIAGNOSIS — H16.9 KERATITIS, BILATERAL: ICD-10-CM

## 2017-07-24 DIAGNOSIS — H02.059 TRICHIASIS OF EYELID WITHOUT ENTROPION, UNSPECIFIED LATERALITY: ICD-10-CM

## 2017-07-24 DIAGNOSIS — H04.123 DRY EYE SYNDROME, BILATERAL: Primary | ICD-10-CM

## 2017-07-24 PROCEDURE — 67820 REVISE EYELASHES: CPT | Mod: PBBFAC,RT | Performed by: OPHTHALMOLOGY

## 2017-07-24 PROCEDURE — 92012 INTRM OPH EXAM EST PATIENT: CPT | Mod: 25,S$PBB,, | Performed by: OPHTHALMOLOGY

## 2017-07-24 PROCEDURE — 99999 PR PBB SHADOW E&M-EST. PATIENT-LVL II: CPT | Mod: PBBFAC,,, | Performed by: OPHTHALMOLOGY

## 2017-07-24 PROCEDURE — 67820 REVISE EYELASHES: CPT | Mod: S$PBB,RT,, | Performed by: OPHTHALMOLOGY

## 2017-07-24 PROCEDURE — 99212 OFFICE O/P EST SF 10 MIN: CPT | Mod: PBBFAC,25 | Performed by: OPHTHALMOLOGY

## 2017-07-24 NOTE — PROGRESS NOTES
HPI     Dry Eye    Additional comments: SEVERE KERATOPATHY MAXITROL MARCIO QID OU           Comments   Patient saw TRF  4 days ago and same diagnosed him with severe keratopathy   with trichiasis TRF  Put patient on maxitrol marcio  qid ou, and patient   states it is not better.    COAG NO MEDS    SLT OS 12/28/15    PCIOL OD 1/5/12  ACIOL OS    H/O Trichiasis  H/O Floaters OU  Radioablation 9-1-2012 and 9-12-13   Pt failed with Restasis in the past       OU:  maxitrol marcio QID Systane 4/8 times daily has not used  genteal gel   qhs has not used since TRF  gave him maxitrol marcio.                                   Last edited by SVETLANA [facility] on 7/24/2017 11:54 AM. (History)            Assessment /Plan     For exam results, see Encounter Report.      ICD-10-CM ICD-9-CM    1. Dry eye syndrome, bilateral H04.123 375.15 severe   2. 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 Keratitis, bilateral H16.9 370.9 As above       Severe keratitis OS>OD  Return to clinic 2 weeks  Lube OS q 1-2 h   Oasis 2-4 times daily OD only to help with cost

## 2017-08-07 ENCOUNTER — OFFICE VISIT (OUTPATIENT)
Dept: OPHTHALMOLOGY | Facility: CLINIC | Age: 77
End: 2017-08-07
Payer: MEDICARE

## 2017-08-07 DIAGNOSIS — H18.30 CORNEAL EPITHELIAL DEFECT: ICD-10-CM

## 2017-08-07 DIAGNOSIS — L12.1 OCULAR CICATRICIAL PEMPHIGOID: Primary | ICD-10-CM

## 2017-08-07 DIAGNOSIS — H04.123 DRY EYE SYNDROME, BILATERAL: ICD-10-CM

## 2017-08-07 PROCEDURE — 99999 PR PBB SHADOW E&M-EST. PATIENT-LVL II: CPT | Mod: PBBFAC,,, | Performed by: OPHTHALMOLOGY

## 2017-08-07 PROCEDURE — 99212 OFFICE O/P EST SF 10 MIN: CPT | Mod: PBBFAC | Performed by: OPHTHALMOLOGY

## 2017-08-07 PROCEDURE — 92012 INTRM OPH EXAM EST PATIENT: CPT | Mod: S$PBB,,, | Performed by: OPHTHALMOLOGY

## 2017-08-07 NOTE — PROGRESS NOTES
HPI     Dry Eye    Additional comments: OU; Systane, Genteal Gel, Oasis BID           Comments   Dry Eye check  OU irritated and dry with use of drops, no improvement    LAST SAW TRF 07/20/17    COAG ( No drops )  SLT OS 12/28/15    PCIOL OD 1/5/12  ACIOL OS    H/O Trichiasis  H/O Floaters OU  Radioablation 9-1-2012 and 9-12-13   Pt failed with Restasis in the past       OU: Genteal Gel, Systane several times daily, [place] BID       Last edited by Elizabeth Munoz on 8/7/2017  8:05 AM. (History)            Assessment /Plan     For exam results, see Encounter Report.      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 Dry eye syndrome, bilateral H04.123 375.15 Discussed the multifactorial components of his problem, including post RK, Dry eye, OCP, and anti-cholingeric systemic medications.    3. 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

## 2017-08-07 NOTE — LETTER
[facility]  [address] [place] [address]  [place] [address]  Phone: [phone]  Fax: [phone]   August 7, 2017    Chandana Yee MD  [address] [address]    Patient: Aston Avendano   MR Number: [id number]   YOB: 1940   Date of Visit: 8/7/2017       Dear Dr. Yee:    Thank you for referring Aston Avendano to me for evaluation. Please consider if he would benefit from plasma drops or Evoxac. I started him on Xiidra today. His exam is actually improved with frequent lube use as compared to previous but he is still miserable.  Here is my assessment and plan of care:    Assessment:   /    Plan:       For exam results, see Encounter Report.      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 Dry eye syndrome, bilateral H04.123 375.15 Discussed the multifactorial components of his problem, including post RK, Dry eye, OCP, and anti-cholingeric systemic medications.    3. 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 If you have questions, please do not hesitate to call me. I look forward to following Mr. Aston Avendano along with you.    Sincerely,        Oliver Carlson MD       CC  No Recipients             Base Eye Exam     Visual Acuity (Snellen - Linear)       Right Left    Dist cc 20/60 +1 20/400    Correction:  Glasses            Slit Lamp and Fundus Exam     External Exam       Right Left    External Brow ptosis, 2+ dermatochalasis Brow ptosis, 2+ dermatochalasis          Slit Lamp Exam        Right Left    Lids/Lashes  2+ Dermatochalasis -  2+ Dermatochalasis - , 1+ Trichiasis - upper lid, 1+ Trichiasis - lower lid    Conjunctiva/Sclera 4 mm nasal symblepharon, and slight temp 1.3 symblepharon  Bleb at 11 o'clock, slight nasal and 7 mm temporal symblepharon , Nasal Temporal 3+ Lissamine green stain    Cornea Punctate stain with 2 filaments AK and RK, severe keratopathy with fine +4 PEE and +3 lissamine green over conj, severe +4 Punctate stain, Epi defect closed    Anterior Chamber Deep and quiet Deep and quiet    Iris Round and reactive Patent peripheral iridectomy at 10 o'clock    Lens Posterior capsular opacification Anterior chamber intraocular lens    Vitreous Normal Normal

## 2017-08-07 NOTE — PROGRESS NOTES
HPI     Dry Eye    Additional comments: OU; Systane, Genteal Gel, Oasis BID           Comments   Dry Eye check  OU irritated and dry with use of drops, no improvement    LAST SAW TRF 07/20/17    COAG ( No drops )  SLT OS 12/28/15    PCIOL OD 1/5/12  ACIOL OS    H/O Trichiasis  H/O Floaters OU  Radioablation 9-1-2012 and 9-12-13   Pt failed with Restasis in the past       OU: Genteal Gel, Systane several times daily, [place] BID       Last edited by Elizabeth Munoz on 8/7/2017  8:05 AM. (History)            Assessment /Plan     For exam results, see Encounter Report.      ICD-10-CM ICD-9-CM    1. Dry eye syndrome, bilateral H04.123 375.15 Some improvement today, yet still advanced    2. 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

## 2017-08-08 ENCOUNTER — TELEPHONE (OUTPATIENT)
Dept: OPHTHALMOLOGY | Facility: CLINIC | Age: 77
End: 2017-08-08

## 2017-08-08 RX ORDER — FLUOROMETHOLONE 1 MG/ML
1 SUSPENSION/ DROPS OPHTHALMIC 3 TIMES DAILY
Qty: 5 ML | Refills: 0 | Status: SHIPPED | OUTPATIENT
Start: 2017-08-08 | End: 2018-05-07 | Stop reason: ALTCHOICE

## 2017-08-08 NOTE — TELEPHONE ENCOUNTER
----- Message from Marcy Melendez, PCT sent at 8/8/2017  4:39 PM CDT -----  Contact: pt  Pt calling to speak to nurse...states that he was seen Monday 08/07/17 and prescribed XIIDRA drops....states that he just picked up Rx today and put some drops in...pt states that the drops cause severe burning sensation and cannot open his eye....wants to know if this is normal or what he needs to do....please adv/call pt back at [phone]///thx jw

## 2017-08-08 NOTE — TELEPHONE ENCOUNTER
SPOKE WITH PATIENT TO INFORM HIM THAT DR. LOREDO WANTS HIM TO STOP THE XIIDRA RIGHT NOW AND START THE FML TID OU FOR 3 DAYS AND THEN RESUME THE XIIDRA BID OU  RX WAS  SENT TO HIS PHARMACY  .

## 2017-08-11 ENCOUNTER — TELEPHONE (OUTPATIENT)
Dept: OPHTHALMOLOGY | Facility: CLINIC | Age: 77
End: 2017-08-11

## 2017-08-11 NOTE — TELEPHONE ENCOUNTER
SPOKE WITH PATIENT AND HE IS ON FML AND IS TOLERATING IT JUST FINE HE WILL RESTART XIIDRA ON Monday AND CALL ME WEDNESDAY TO LET ME KNOW HOW HE IS DOING ONCE HE HAS RESUMED THE XIIDRA.

## 2017-08-17 ENCOUNTER — TELEPHONE (OUTPATIENT)
Dept: OPHTHALMOLOGY | Facility: CLINIC | Age: 77
End: 2017-08-17

## 2017-08-17 NOTE — TELEPHONE ENCOUNTER
Mr. Avendano called to report that his eyes feel much better overall since re-starting the Xiidra.  There is still some burning OS, but it is tolerable.  Pt. Advised to call if any problems occur.

## 2017-08-17 NOTE — TELEPHONE ENCOUNTER
----- Message from Marcy Hastings sent at 8/17/2017  1:53 PM CDT -----  Contact: Pt  Pt is requesting to speak to the nurse regarding some issues that he's having with Xiidra. Pls call pt back at [phone].

## 2017-09-18 ENCOUNTER — OFFICE VISIT (OUTPATIENT)
Dept: OPHTHALMOLOGY | Facility: CLINIC | Age: 77
End: 2017-09-18
Payer: MEDICARE

## 2017-09-18 DIAGNOSIS — L12.1 OCULAR CICATRICIAL PEMPHIGOID: Primary | ICD-10-CM

## 2017-09-18 DIAGNOSIS — H02.051 TRICHIASIS OF RIGHT UPPER EYELID: ICD-10-CM

## 2017-09-18 DIAGNOSIS — Z96.1 PSEUDOPHAKIA OF BOTH EYES: ICD-10-CM

## 2017-09-18 DIAGNOSIS — H04.123 DRY EYE SYNDROME, BILATERAL: ICD-10-CM

## 2017-09-18 PROCEDURE — 99999 PR PBB SHADOW E&M-EST. PATIENT-LVL II: CPT | Mod: PBBFAC,,, | Performed by: OPHTHALMOLOGY

## 2017-09-18 PROCEDURE — 99212 OFFICE O/P EST SF 10 MIN: CPT | Mod: PBBFAC | Performed by: OPHTHALMOLOGY

## 2017-09-18 PROCEDURE — 67820 REVISE EYELASHES: CPT | Mod: PBBFAC,RT | Performed by: OPHTHALMOLOGY

## 2017-09-18 PROCEDURE — 92012 INTRM OPH EXAM EST PATIENT: CPT | Mod: 25,S$PBB,, | Performed by: OPHTHALMOLOGY

## 2017-09-18 PROCEDURE — 67820 REVISE EYELASHES: CPT | Mod: S$PBB,RT,, | Performed by: OPHTHALMOLOGY

## 2017-09-18 NOTE — PROGRESS NOTES
HPI     Here for 2 months cornea check.  Saw Dr. Yee about 2 weeks ago, and   was told that scar tissue OS was reason for his discomfort.  He has been   wearing a contact lens OS, but thinks it fell out.  He has lashes   scratching OD.    COAG ( No drops )  SLT OS 12/28/15    PCIOL OD 1/5/12  ACIOL OS    H/O Trichiasis  H/O Floaters OU  Radioablation 9-1-2012 and 9-12-13   Pt failed with Restasis in the past   Symblepharon OU       OU: Genteal Gel, Systane several times daily, [place] BID    Derm- Dr Mandujano at [facility]    Last edited by Katya Marinelli on 9/18/2017  9:15 AM. (History)            Assessment /Plan     For exam results, see Encounter Report.      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 Dry eye syndrome, bilateral H04.123 375.15 Improved some today    3. Trichiasis of right upper eyelid H02.051 374.05  2 lashes from Right eye upper lid removed today    4. Pseudophakia of both eyes Z96.1 V43.1      OU: Genteal Gel, Systane several times daily, [place] BID    Acuvue Oasys lens placed today OS  8.4 14.0 PLANO     RETURN TO CLINIC every 2 weeks for cls removal- or he will return to Dr Vang office for insertion/removal   Pt defers learning insertion/removal at this time- he can go to a satellite of Dr Vang office in [place] to see Dr Li   Pt will see Dr Stanton for eyelid eval and possible sx consult for his scar tissue on his lids     Must use Pres Free tears (espceially when cls is in ) ,PD  Systane several times daily, [place] BID, gel at bedtime OD

## 2017-09-28 ENCOUNTER — CLINICAL SUPPORT (OUTPATIENT)
Dept: CARDIOLOGY | Facility: CLINIC | Age: 77
End: 2017-09-28
Payer: MEDICARE

## 2017-09-28 DIAGNOSIS — I49.5 SINUS NODE DYSFUNCTION: ICD-10-CM

## 2017-09-28 DIAGNOSIS — R55 SYNCOPE AND COLLAPSE: ICD-10-CM

## 2017-09-28 PROCEDURE — 93280 PM DEVICE PROGR EVAL DUAL: CPT | Mod: PBBFAC | Performed by: INTERNAL MEDICINE

## 2017-10-23 ENCOUNTER — TELEPHONE (OUTPATIENT)
Dept: OPHTHALMOLOGY | Facility: CLINIC | Age: 77
End: 2017-10-23

## 2017-10-23 ENCOUNTER — OFFICE VISIT (OUTPATIENT)
Dept: OPHTHALMOLOGY | Facility: CLINIC | Age: 77
End: 2017-10-23
Payer: MEDICARE

## 2017-10-23 DIAGNOSIS — H04.123 DRY EYE SYNDROME, BILATERAL: ICD-10-CM

## 2017-10-23 DIAGNOSIS — L12.1 OCULAR CICATRICIAL PEMPHIGOID: Primary | ICD-10-CM

## 2017-10-23 DIAGNOSIS — H40.1131 PRIMARY OPEN ANGLE GLAUCOMA OF BOTH EYES, MILD STAGE: ICD-10-CM

## 2017-10-23 DIAGNOSIS — Z96.1 PSEUDOPHAKIA OF BOTH EYES: ICD-10-CM

## 2017-10-23 PROCEDURE — 99999 PR PBB SHADOW E&M-EST. PATIENT-LVL II: CPT | Mod: PBBFAC,,, | Performed by: OPHTHALMOLOGY

## 2017-10-23 PROCEDURE — 92014 COMPRE OPH EXAM EST PT 1/>: CPT | Mod: S$PBB,,, | Performed by: OPHTHALMOLOGY

## 2017-10-23 PROCEDURE — 99212 OFFICE O/P EST SF 10 MIN: CPT | Mod: PBBFAC | Performed by: OPHTHALMOLOGY

## 2017-10-23 RX ORDER — TRAVOPROST OPHTHALMIC SOLUTION 0.04 MG/ML
1 SOLUTION OPHTHALMIC NIGHTLY
Qty: 2.5 ML | Refills: 6 | Status: SHIPPED | OUTPATIENT
Start: 2017-10-23 | End: 2017-10-23 | Stop reason: SDUPTHER

## 2017-10-23 RX ORDER — TRAVOPROST OPHTHALMIC SOLUTION 0.04 MG/ML
1 SOLUTION OPHTHALMIC NIGHTLY
Qty: 2.5 ML | Refills: 6 | Status: SHIPPED | OUTPATIENT
Start: 2017-10-23 | End: 2018-01-25

## 2017-10-23 NOTE — PROGRESS NOTES
HPI     Patient returns for a 5 week corneal check OS, patient saw Dr. Li   approx a week and a half ago and he changes out his contact lens, patient   states 0/10 pain scale, UNABLE TO VERIFY ORAL MEDS PATIENT DOES NOT KNOW   THE NAME OF THEM..  Pt is also followed by Dermatology  and they watch his blood counts     COAG ( No drops )  SLT OS 12/28/15    PCIOL OD 1/5/12  ACIOL OS    H/O Trichiasis  H/O Floaters OU  Radioablation 9-1-2012 and 9-12-13   Pt failed with Restasis in the past   Symblepharon OU       OU: Genteal Gel, Systane several times daily, [place] BID    Derm- Dr Mandujano at [facility]    Doing well with Scl OS  Systane Ultra       Last edited by Oliver Carlson MD on 10/23/2017  8:49 AM. (History)            Assessment /Plan     For exam results, see Encounter Report.      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 Dry eye syndrome, bilateral H04.123 375.15 Severe- continue with current treatment    3. Primary open angle glaucoma of both eyes, mild stage H40.1131 365.11 IOP not within acceptable range relative to target IOP with risk of irreversible visual loss. Additional treatment required.  Discussed options, risks, and benefits of additional medication, SLT laser, or incisional glaucoma surgery.     recommend medications    Patient chooses meds    Reviewed importance of continued compliance with treatment and follow up.   travoprost (TRAVATAN Z) 0.004 % Drop     365.71    4. Pseudophakia of both eyes Z96.1 V43.1        Will try Travantan Z first QHS OU (if insurance doesn't cover- we can try Latanoprost QHS OU) pt will call and let us know     Pt was intolerant to preservative drops In the past- it made keratopathy much worse     RETURN TO CLINIC 1 month IOP at [facility]

## 2017-10-23 NOTE — TELEPHONE ENCOUNTER
----- Message from Susanna Pittman sent at 10/23/2017  2:32 PM CDT -----  Contact: [facility]  States she calling regarding the patients eye drops, its too expensive. Please call [facility] at [phone]. Thank you

## 2017-10-23 NOTE — TELEPHONE ENCOUNTER
Spoke with Haven at pharmacy.  Per Dr. Carlson, can substitute latanoprost for Travatan, due to insurance issue with Travatan.

## 2017-10-24 NOTE — TELEPHONE ENCOUNTER
----- Message from Aaliyah Jonas sent at 10/24/2017  8:07 AM CDT -----  Contact: pt  He's returning a missed call, he stated he only wants to speak back to Nurse Lucy,  please advise [phone]

## 2017-10-24 NOTE — TELEPHONE ENCOUNTER
I called MR Segura  back- informed him we did not see a contact lens in his eye yesterday  On exam and we did not place another contact

## 2017-11-27 ENCOUNTER — OFFICE VISIT (OUTPATIENT)
Dept: OPHTHALMOLOGY | Facility: CLINIC | Age: 77
End: 2017-11-27
Payer: MEDICARE

## 2017-11-27 DIAGNOSIS — H04.123 DRY EYE SYNDROME, BILATERAL: ICD-10-CM

## 2017-11-27 DIAGNOSIS — H40.1131 PRIMARY OPEN ANGLE GLAUCOMA OF BOTH EYES, MILD STAGE: Primary | ICD-10-CM

## 2017-11-27 DIAGNOSIS — Z96.1 PSEUDOPHAKIA OF BOTH EYES: ICD-10-CM

## 2017-11-27 DIAGNOSIS — L12.1 OCULAR CICATRICIAL PEMPHIGOID: ICD-10-CM

## 2017-11-27 PROCEDURE — 92012 INTRM OPH EXAM EST PATIENT: CPT | Mod: S$PBB,,, | Performed by: OPHTHALMOLOGY

## 2017-11-27 PROCEDURE — 99999 PR PBB SHADOW E&M-EST. PATIENT-LVL II: CPT | Mod: PBBFAC,,, | Performed by: OPHTHALMOLOGY

## 2017-11-27 PROCEDURE — 99212 OFFICE O/P EST SF 10 MIN: CPT | Mod: PBBFAC | Performed by: OPHTHALMOLOGY

## 2017-11-27 NOTE — PROGRESS NOTES
HPI     Here for IOP check. Pt saw Dr Li last week and had lashes removed and   dryness checked- he was to see Derm the week prior but did not see him. Pt   is due to see Dr Yee next month as well    Tolerating Travatan Z    COAG   SLT OS 12/28/15    PCIOL OD 1/5/12  ACIOL OS    H/O Trichiasis  H/O Floaters OU  Radioablation 9-1-2012 and 9-12-13   Pt failed with Restasis in the past   Symblepharon OU       OU: Genteal Gel, Systane several times daily  Travatan qhs OU    Derm- [facility] at [facility]    Last edited by Oliver Carlson MD on 11/27/2017  9:30 AM. (History)            Assessment /Plan     For exam results, see Encounter Report.      ICD-10-CM ICD-9-CM    1. Primary open angle glaucoma of both eyes, mild stage H40.1131 365.11 IOP better today on Travtan Z     365.71    2. 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 Dry eye syndrome, bilateral H04.123 375.15    4. Pseudophakia of both eyes Z96.1 V43.1        RETURN TO CLINIC with Dr Yee in 1 month as scheduled   RETURN TO CLINIC 2-3 months at [facility] with us     OU: Genteal Gel, Systane several times daily  Travatan qhs OU

## 2018-01-15 ENCOUNTER — CLINICAL SUPPORT (OUTPATIENT)
Dept: CARDIOLOGY | Facility: CLINIC | Age: 78
End: 2018-01-15
Payer: MEDICARE

## 2018-01-15 DIAGNOSIS — I49.5 SINUS NODE DYSFUNCTION: ICD-10-CM

## 2018-01-15 DIAGNOSIS — R55 SYNCOPE AND COLLAPSE: ICD-10-CM

## 2018-01-15 PROCEDURE — 93294 REM INTERROG EVL PM/LDLS PM: CPT | Mod: ,,, | Performed by: INTERNAL MEDICINE

## 2018-01-15 PROCEDURE — 93296 REM INTERROG EVL PM/IDS: CPT | Mod: PBBFAC,PO | Performed by: INTERNAL MEDICINE

## 2018-01-24 DIAGNOSIS — I77.9 CAROTID ARTERY DISEASE, UNSPECIFIED LATERALITY: Primary | ICD-10-CM

## 2018-01-25 ENCOUNTER — CLINICAL SUPPORT (OUTPATIENT)
Dept: CARDIOLOGY | Facility: CLINIC | Age: 78
End: 2018-01-25
Payer: MEDICARE

## 2018-01-25 ENCOUNTER — OFFICE VISIT (OUTPATIENT)
Dept: CARDIOLOGY | Facility: CLINIC | Age: 78
End: 2018-01-25
Payer: MEDICARE

## 2018-01-25 VITALS
BODY MASS INDEX: 28.98 KG/M2 | DIASTOLIC BLOOD PRESSURE: 64 MMHG | HEIGHT: 71 IN | WEIGHT: 207 LBS | SYSTOLIC BLOOD PRESSURE: 120 MMHG | HEART RATE: 50 BPM

## 2018-01-25 DIAGNOSIS — I10 HTN (HYPERTENSION), BENIGN: Chronic | ICD-10-CM

## 2018-01-25 DIAGNOSIS — I70.0 ABDOMINAL AORTIC ATHEROSCLEROSIS: Chronic | ICD-10-CM

## 2018-01-25 DIAGNOSIS — I77.9 CAROTID ARTERY DISEASE, UNSPECIFIED LATERALITY: ICD-10-CM

## 2018-01-25 DIAGNOSIS — Z95.0 CARDIAC PACEMAKER: Primary | Chronic | ICD-10-CM

## 2018-01-25 DIAGNOSIS — E78.5 HYPERLIPIDEMIA, UNSPECIFIED HYPERLIPIDEMIA TYPE: Chronic | ICD-10-CM

## 2018-01-25 PROCEDURE — 99213 OFFICE O/P EST LOW 20 MIN: CPT | Mod: PBBFAC,25 | Performed by: INTERNAL MEDICINE

## 2018-01-25 PROCEDURE — 93005 ELECTROCARDIOGRAM TRACING: CPT | Mod: PBBFAC | Performed by: INTERNAL MEDICINE

## 2018-01-25 PROCEDURE — 93010 ELECTROCARDIOGRAM REPORT: CPT | Mod: S$PBB,,, | Performed by: INTERNAL MEDICINE

## 2018-01-25 PROCEDURE — 99213 OFFICE O/P EST LOW 20 MIN: CPT | Mod: S$PBB,,, | Performed by: INTERNAL MEDICINE

## 2018-01-25 PROCEDURE — 99999 PR PBB SHADOW E&M-EST. PATIENT-LVL III: CPT | Mod: PBBFAC,,, | Performed by: INTERNAL MEDICINE

## 2018-01-25 NOTE — PROGRESS NOTES
Subjective:   Patient ID:  Aston Avendano is a 78 y.o. male who presents for follow up of Carotid Artery Disease; Hypertension; and Hyperlipidemia      HPI  A 79 yo male with h/o pacer htn chronic back pain s/p multiple surgery last 6-7 months ago has significant improvement. Got physical therapy  Has improved over all now uses the cane intermittently has no chf symptoms chest pain no tia claudication no palpitation.pacer function adequate by checks.  Past Medical History:   Diagnosis Date    Arthritis     Cardiac syncope 2011    s/p pacemaker     Colon cancer screening 12/5/2016    Glaucoma     History of carotid artery stenosis 9/26/2013    No stenosis on scan done 11/2014. S/p CEA     Hypertension        Past Surgical History:   Procedure Laterality Date    CARDIAC PACEMAKER PLACEMENT      CARDIAC PACEMAKER PLACEMENT      CAROTID ENDARTERECTOMY      CATARACT EXTRACTION W/  INTRAOCULAR LENS IMPLANT  OD 1/5/12    CATARACT EXTRACTION W/  INTRAOCULAR LENS IMPLANT  OS date unknown    COLONOSCOPY N/A 12/5/2016    Procedure: COLONOSCOPY;  Surgeon: Kary Kohler MD;  Location: [facility];  Service: Endoscopy;  Laterality: N/A;    LUMBAR FUSION  2013    RHIZOTOMY W/ RADIOFREQUENCY ABLATION  2010       Social History   Substance Use Topics    Smoking status: Former Smoker     Packs/day: 0.25     Years: 50.00     Quit date: 5/1/2006    Smokeless tobacco: Never Used    Alcohol use Yes      Comment: OCC       Family History   Problem Relation Age of Onset    Hypertension Mother     Hypertension Father        Current Outpatient Prescriptions   Medication Sig    aspirin (ECOTRIN) 81 MG EC tablet Take by mouth. 1 Tablet, Delayed Release (E.C.) Oral Every day    chlorthalidone (HYGROTEN) 25 MG Tab TAKE 1 TABLET ONE DAY AND 1/2 TABLET THE NEXT DAY AS DIRECTED    cyclobenzaprine (FLEXERIL) 10 MG tablet Take 1 tablet (10 mg total) by mouth 3 (three) times daily as needed for Muscle spasms.    dapsone 25 MG Tab 1  po bid    diazepam (VALIUM) 5 MG tablet TAKE 1 TABLET EVERY 12 HOURS AS NEEDED SPASMS    finasteride (PROSCAR) 5 mg tablet Take 5 mg by mouth once daily.     fish oil-omega-3 fatty acids 300-1,000 mg capsule Take 2 g by mouth once daily.    gabapentin (NEURONTIN) 600 MG tablet Take 1 tablet (600 mg total) by mouth 2 (two) times daily.    ibuprofen (ADVIL,MOTRIN) 800 MG tablet Take 800 mg by mouth every 8 (eight) hours.    losartan (COZAAR) 100 MG tablet TAKE 1 TABLET (100 MG TOTAL) BY MOUTH ONCE DAILY.    methocarbamol (ROBAXIN) 750 MG Tab Take 500 mg by mouth 4 (four) times daily.    mycophenolate (CELLCEPT) 500 mg Tab 3 in the AM and 3 in the PM    oxybutynin (DITROPAN-XL) 10 MG 24 hr tablet TAKE 1 TABLET (10 MG TOTAL) BY MOUTH DAILY.    oxycodone-acetaminophen (PERCOCET) 7.5-325 mg per tablet Take 1 tablet by mouth every 8 (eight) hours as needed.    ranitidine (ZANTAC) 300 MG tablet Take 1 tablet (300 mg total) by mouth every evening.    simvastatin (ZOCOR) 20 MG tablet TAKE 1/2 TABLET BY MOUTH DAILY    tamsulosin (FLOMAX) 0.4 mg Cp24 Take 0.4 mg by mouth once daily.     vardenafil (LEVITRA) 20 MG tablet Take 20 mg by mouth.    VESICARE 10 mg tablet Take 10 mg by mouth once daily.    zolpidem (AMBIEN) 5 MG Tab 5 mg nightly as needed.     fluorometholone 0.1% (FML) 0.1 % DrpS Place 1 drop into both eyes 3 (three) times daily.     No current facility-administered medications for this visit.      Current Outpatient Prescriptions on File Prior to Visit   Medication Sig    aspirin (ECOTRIN) 81 MG EC tablet Take by mouth. 1 Tablet, Delayed Release (E.C.) Oral Every day    chlorthalidone (HYGROTEN) 25 MG Tab TAKE 1 TABLET ONE DAY AND 1/2 TABLET THE NEXT DAY AS DIRECTED    cyclobenzaprine (FLEXERIL) 10 MG tablet Take 1 tablet (10 mg total) by mouth 3 (three) times daily as needed for Muscle spasms.    dapsone 25 MG Tab 1 po bid    diazepam (VALIUM) 5 MG tablet TAKE 1 TABLET EVERY 12 HOURS AS NEEDED  SPASMS    finasteride (PROSCAR) 5 mg tablet Take 5 mg by mouth once daily.     fish oil-omega-3 fatty acids 300-1,000 mg capsule Take 2 g by mouth once daily.    gabapentin (NEURONTIN) 600 MG tablet Take 1 tablet (600 mg total) by mouth 2 (two) times daily.    ibuprofen (ADVIL,MOTRIN) 800 MG tablet Take 800 mg by mouth every 8 (eight) hours.    losartan (COZAAR) 100 MG tablet TAKE 1 TABLET (100 MG TOTAL) BY MOUTH ONCE DAILY.    methocarbamol (ROBAXIN) 750 MG Tab Take 500 mg by mouth 4 (four) times daily.    mycophenolate (CELLCEPT) 500 mg Tab 3 in the AM and 3 in the PM    oxybutynin (DITROPAN-XL) 10 MG 24 hr tablet TAKE 1 TABLET (10 MG TOTAL) BY MOUTH DAILY.    oxycodone-acetaminophen (PERCOCET) 7.5-325 mg per tablet Take 1 tablet by mouth every 8 (eight) hours as needed.    ranitidine (ZANTAC) 300 MG tablet Take 1 tablet (300 mg total) by mouth every evening.    simvastatin (ZOCOR) 20 MG tablet TAKE 1/2 TABLET BY MOUTH DAILY    tamsulosin (FLOMAX) 0.4 mg Cp24 Take 0.4 mg by mouth once daily.     vardenafil (LEVITRA) 20 MG tablet Take 20 mg by mouth.    VESICARE 10 mg tablet Take 10 mg by mouth once daily.    zolpidem (AMBIEN) 5 MG Tab 5 mg nightly as needed.     [DISCONTINUED] sildenafil (VIAGRA) 100 MG tablet Take 1 tablet (100 mg total) by mouth daily as needed for Erectile Dysfunction.    fluorometholone 0.1% (FML) 0.1 % DrpS Place 1 drop into both eyes 3 (three) times daily.    [DISCONTINUED] etodolac (LODINE) 500 MG tablet Take 1 tablet (500 mg total) by mouth 2 (two) times daily with meals.    [DISCONTINUED] lifitegrast (XIIDRA) 5 % Dpet Apply 1 drop to eye 2 (two) times daily. Both eyes    [DISCONTINUED] tramadol (ULTRAM) 50 mg tablet Take 50 mg by mouth.    [DISCONTINUED] travoprost (TRAVATAN Z) 0.004 % Drop Place 1 drop into both eyes every evening.     No current facility-administered medications on file prior to visit.        Review of Systems   Constitution: Negative for weakness  and malaise/fatigue.   Eyes: Negative for blurred vision.   Cardiovascular: Negative for chest pain, claudication, cyanosis, dyspnea on exertion, irregular heartbeat, leg swelling, near-syncope, orthopnea, palpitations and paroxysmal nocturnal dyspnea.   Respiratory: Negative for cough, hemoptysis and shortness of breath.    Hematologic/Lymphatic: Negative for bleeding problem. Does not bruise/bleed easily.   Skin: Negative for dry skin and itching.   Musculoskeletal: Positive for arthritis and joint pain. Negative for falls, muscle weakness and myalgias. Back pain: improved.   Gastrointestinal: Negative for abdominal pain, diarrhea, heartburn, hematemesis, hematochezia and melena.   Genitourinary: Negative for flank pain and hematuria.   Neurological: Negative for dizziness, focal weakness, headaches, light-headedness and seizures. Loss of balance: improved. Numbness: improved. Paresthesias: improved.   Psychiatric/Behavioral: Negative for altered mental status and memory loss. The patient is not nervous/anxious.    Allergic/Immunologic: Negative for hives.       Objective:   Physical Exam   Constitutional: He is oriented to person, place, and time. He appears well-developed and well-nourished. No distress.   HENT:   Head: Normocephalic and atraumatic.   Eyes: EOM are normal. Pupils are equal, round, and reactive to light. Right eye exhibits no discharge. Left eye exhibits no discharge.   Neck: Neck supple. No JVD present. No thyromegaly present.   Scar cea well healed.   Cardiovascular: Normal rate, regular rhythm, normal heart sounds and intact distal pulses.  Exam reveals no gallop and no friction rub.    No murmur heard.  Pulmonary/Chest: Effort normal and breath sounds normal. No respiratory distress. He has no wheezes. He has no rales. He exhibits no tenderness.   Scar pacer well healed   Abdominal: Soft. Bowel sounds are normal. He exhibits no distension. There is no tenderness.   Musculoskeletal: Normal  "range of motion. He exhibits no edema.   Neurological: He is alert and oriented to person, place, and time. No cranial nerve deficit.   Skin: Skin is warm and dry. No rash noted. He is not diaphoretic. No erythema.   Varicose veins noted    Psychiatric: He has a normal mood and affect. His behavior is normal.   Nursing note and vitals reviewed.    Vitals:    01/25/18 0919 01/25/18 0923   BP: 110/66 120/64   BP Location: Right arm Left arm   Patient Position: Sitting Sitting   BP Method: Large (Manual) Large (Manual)   Pulse: (!) 50    Weight: 93.9 kg (207 lb)    Height: 5' 11" (1.803 m)      Lab Results   Component Value Date    CHOL 132 05/01/2015    CHOL 142 04/24/2014    CHOL 114 (L) 09/23/2013     Lab Results   Component Value Date    HDL 46 05/01/2015    HDL 42 04/24/2014    HDL 37 (L) 09/23/2013     Lab Results   Component Value Date    LDLCALC 76.2 05/01/2015    LDLCALC 85.6 04/24/2014    LDLCALC 63.8 09/23/2013     Lab Results   Component Value Date    TRIG 49 05/01/2015    TRIG 72 04/24/2014    TRIG 66 09/23/2013     Lab Results   Component Value Date    CHOLHDL 34.8 05/01/2015    CHOLHDL 29.6 04/24/2014    CHOLHDL 32.5 09/23/2013       Chemistry        Component Value Date/Time     12/30/2015 0912    K 4.0 12/30/2015 0912     12/30/2015 0912    CO2 27 12/30/2015 0912    BUN 13 12/30/2015 0912    CREATININE 1.0 12/30/2015 0912    GLU 86 12/30/2015 0912        Component Value Date/Time    CALCIUM 9.2 12/30/2015 0912    ALKPHOS 60 05/01/2015 0810    AST 20 05/01/2015 0810    ALT 26 05/01/2015 0810    BILITOT 0.5 05/01/2015 0810    ESTGFRAFRICA >60.0 12/30/2015 0912    EGFRNONAA >60.0 12/30/2015 0912          Lab Results   Component Value Date    TSH 1.53 02/15/2011     Lab Results   Component Value Date    INR 1.1 07/22/2009     Lab Results   Component Value Date    WBC 5.74 12/30/2015    HGB 15.5 12/30/2015    HCT 44.2 12/30/2015    MCV 91 12/30/2015     (L) 12/30/2015     BMP  Sodium "   Date Value Ref Range Status   12/30/2015 140 136 - 145 mmol/L Final     Potassium   Date Value Ref Range Status   12/30/2015 4.0 3.5 - 5.1 mmol/L Final     Chloride   Date Value Ref Range Status   12/30/2015 105 95 - 110 mmol/L Final     CO2   Date Value Ref Range Status   12/30/2015 27 23 - 29 mmol/L Final     BUN, Bld   Date Value Ref Range Status   12/30/2015 13 8 - 23 mg/dL Final     Creatinine   Date Value Ref Range Status   12/30/2015 1.0 0.5 - 1.4 mg/dL Final     Calcium   Date Value Ref Range Status   12/30/2015 9.2 8.7 - 10.5 mg/dL Final     Anion Gap   Date Value Ref Range Status   12/30/2015 8 8 - 16 mmol/L Final     eGFR if    Date Value Ref Range Status   12/30/2015 >60.0 >60 mL/min/1.73 m^2 Final     eGFR if non    Date Value Ref Range Status   12/30/2015 >60.0 >60 mL/min/1.73 m^2 Final     Comment:     Calculation used to obtain the estimated glomerular filtration  rate (eGFR) is the CKD-EPI equation. Since race is unknown   in our information system, the eGFR values for   -American and Non--American patients are given   for each creatinine result.       CrCl cannot be calculated (Patient's most recent lab result is older than the maximum 7 days allowed.).  ekg paced  Assessment:     1. Cardiac pacemaker    2. Hyperlipidemia, unspecified hyperlipidemia type    3. HTN (hypertension), benign    4. Abdominal aortic atherosclerosis - seen on AAA screening scan      Stable clinically no  arrythmic issues bp controlled.  Plan:   Continue current therapy  Cardiac low salt diet.  Risk factor modification and excercise program.  F/u in 1 year earlier if any issues. His pcp will address his lipids and meds.

## 2018-02-12 ENCOUNTER — TELEPHONE (OUTPATIENT)
Dept: OPHTHALMOLOGY | Facility: CLINIC | Age: 78
End: 2018-02-12

## 2018-02-12 NOTE — TELEPHONE ENCOUNTER
----- Message from Patricia Rodriguez sent at 2/12/2018  1:32 PM CST -----  Contact: pt   States he's calling rg needing a refill on his glasses rx and can be reached at [phone]//thanks/dbangy

## 2018-02-16 ENCOUNTER — TELEPHONE (OUTPATIENT)
Dept: OPHTHALMOLOGY | Facility: CLINIC | Age: 78
End: 2018-02-16

## 2018-02-16 NOTE — TELEPHONE ENCOUNTER
----- Message from Korin Epstein sent at 2/16/2018  8:42 AM CST -----  Contact: Pt  Pt called and stated she was returning a call to the nurse. He can be reached at [phone].    Thanks,  TF

## 2018-02-16 NOTE — TELEPHONE ENCOUNTER
I left pt a message that I had Dr Carlson verify his chart and states to dispense the last rx and it was again verified with Dr Carlson before I mailed it to him next week

## 2018-02-16 NOTE — TELEPHONE ENCOUNTER
----- Message from Vianey Srivastava sent at 2/16/2018  1:57 PM CST -----  Contact: pt/[phone]  Would like to speak with nurse staff about eyeglass rx pt wanted to confirm before getting filled to make sure he has the right eyeglass rx pls call today

## 2018-02-19 ENCOUNTER — TELEPHONE (OUTPATIENT)
Dept: OPHTHALMOLOGY | Facility: CLINIC | Age: 78
End: 2018-02-19

## 2018-02-19 ENCOUNTER — OFFICE VISIT (OUTPATIENT)
Dept: OPHTHALMOLOGY | Facility: CLINIC | Age: 78
End: 2018-02-19
Payer: MEDICARE

## 2018-02-19 DIAGNOSIS — H04.123 DRY EYE SYNDROME, BILATERAL: ICD-10-CM

## 2018-02-19 DIAGNOSIS — L12.1 OCULAR CICATRICIAL PEMPHIGOID: Primary | ICD-10-CM

## 2018-02-19 DIAGNOSIS — H40.1131 PRIMARY OPEN ANGLE GLAUCOMA OF BOTH EYES, MILD STAGE: ICD-10-CM

## 2018-02-19 DIAGNOSIS — Z96.1 PSEUDOPHAKIA OF BOTH EYES: ICD-10-CM

## 2018-02-19 PROCEDURE — 92012 INTRM OPH EXAM EST PATIENT: CPT | Mod: S$PBB,,, | Performed by: OPHTHALMOLOGY

## 2018-02-19 PROCEDURE — 99212 OFFICE O/P EST SF 10 MIN: CPT | Mod: PBBFAC | Performed by: OPHTHALMOLOGY

## 2018-02-19 PROCEDURE — 99999 PR PBB SHADOW E&M-EST. PATIENT-LVL II: CPT | Mod: PBBFAC,,, | Performed by: OPHTHALMOLOGY

## 2018-02-19 NOTE — TELEPHONE ENCOUNTER
----- Message from Vianey Srivastava sent at 2/19/2018  1:04 PM CST -----  Contact: pt  States  forgot to give him a copy of his new eyeglass rx this morning when he was seen pls mail copy to home address [phone]

## 2018-02-19 NOTE — PROGRESS NOTES
HPI     Pt. Is here for appointment.  He wasn't supposed to come back until after   he sees Nakia.  He will see Nakia on Monday. There has been much   confusion regarding his appointment and he is unhappy about that.   He   hasn't filled new Rx yet.  No new complaints.      COAG  SLT OS 12/28/15    PCIOL OD 1/5/12  ACIOL OS    H/O Trichiasis  H/O Floaters OU  Radioablation 9-1-2012 and 9-12-13   Pt failed with Restasis in the past   Symblepharon OU       OU: Genteal Gel, Systane several times daily  Travatan z qhs OU    Last edited by Katya Marinelli on 2/19/2018  8:41 AM. (History)            Assessment /Plan     For exam results, see Encounter Report.      ICD-10-CM ICD-9-CM    1. 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 Dry eye syndrome, bilateral H04.123 375.15 Followed by myself and Dr Yee-    3. Primary open angle glaucoma of both eyes, mild stage H40.1131 365.11 Doing well - intraocular pressure is within acceptable range relative to target pressure with no evidence of progression.   Continue current treatment.  Reviewed importance of continued compliance with treatment and follow up.        365.71    4. Pseudophakia of both eyes Z96.1 V43.1 Well          Pt shown last mr vs current pc- pt prefers current pc vs last mr, pt will not fill not fill recent mr that was given      Disp Wearing per patient request - explained that this will not improve vision.    OU: Genteal Gel, Systane several times daily  Travatan z qhs OU     RETURN TO CLINIC 4 month IOP at [facility]

## 2018-02-19 NOTE — TELEPHONE ENCOUNTER
SPOKE WITH PATIENT AND HE WANTS A COPY OF THE RX HE CAME IN WEARING ON HIS VISIT DATED 11/27/17 I TOLD HIM I WILL GET IN THE MAIL TOMORROW.

## 2018-04-16 ENCOUNTER — CLINICAL SUPPORT (OUTPATIENT)
Dept: CARDIOLOGY | Facility: CLINIC | Age: 78
End: 2018-04-16
Attending: INTERNAL MEDICINE
Payer: MEDICARE

## 2018-04-16 DIAGNOSIS — I49.5 SINUS NODE DYSFUNCTION: ICD-10-CM

## 2018-04-16 DIAGNOSIS — R55 SYNCOPE AND COLLAPSE: ICD-10-CM

## 2018-04-16 PROCEDURE — 93296 REM INTERROG EVL PM/IDS: CPT | Mod: PBBFAC,PO | Performed by: INTERNAL MEDICINE

## 2018-04-16 PROCEDURE — 93294 REM INTERROG EVL PM/LDLS PM: CPT | Mod: ,,, | Performed by: INTERNAL MEDICINE

## 2018-04-20 DIAGNOSIS — I49.5 SINOATRIAL NODE DYSFUNCTION: Primary | ICD-10-CM

## 2018-04-20 DIAGNOSIS — R55 SYNCOPE AND COLLAPSE: ICD-10-CM

## 2018-05-07 ENCOUNTER — TELEPHONE (OUTPATIENT)
Dept: OPHTHALMOLOGY | Facility: CLINIC | Age: 78
End: 2018-05-07

## 2018-05-07 ENCOUNTER — OFFICE VISIT (OUTPATIENT)
Dept: OPHTHALMOLOGY | Facility: CLINIC | Age: 78
End: 2018-05-07
Payer: MEDICARE

## 2018-05-07 DIAGNOSIS — H11.233 SYMBLEPHARON OF BOTH EYES: ICD-10-CM

## 2018-05-07 DIAGNOSIS — H04.123 DRY EYE SYNDROME, BILATERAL: Primary | ICD-10-CM

## 2018-05-07 DIAGNOSIS — L12.1 OCULAR CICATRICIAL PEMPHIGOID: ICD-10-CM

## 2018-05-07 DIAGNOSIS — Z96.1 PSEUDOPHAKIA OF BOTH EYES: ICD-10-CM

## 2018-05-07 DIAGNOSIS — H40.1131 PRIMARY OPEN ANGLE GLAUCOMA OF BOTH EYES, MILD STAGE: ICD-10-CM

## 2018-05-07 PROCEDURE — 92012 INTRM OPH EXAM EST PATIENT: CPT | Mod: S$PBB,,, | Performed by: OPHTHALMOLOGY

## 2018-05-07 PROCEDURE — 99999 PR PBB SHADOW E&M-EST. PATIENT-LVL II: CPT | Mod: PBBFAC,,, | Performed by: OPHTHALMOLOGY

## 2018-05-07 PROCEDURE — 99212 OFFICE O/P EST SF 10 MIN: CPT | Mod: PBBFAC | Performed by: OPHTHALMOLOGY

## 2018-05-07 RX ORDER — TAFLUPROST OPTHALMIC 0 MG/.3ML
1 SOLUTION/ DROPS OPHTHALMIC NIGHTLY
Qty: 1 EACH | Refills: 6 | Status: SHIPPED | OUTPATIENT
Start: 2018-05-07 | End: 2018-05-10 | Stop reason: SDUPTHER

## 2018-05-07 NOTE — TELEPHONE ENCOUNTER
I called pt at home- wife asked for me to call his cell and call [phone] - I then called him and he states that he saw another MD they gave him a gel to use for this infection  I asked for Mr Avendano to  Come in today so Dr Carlson can evaluate him

## 2018-05-07 NOTE — TELEPHONE ENCOUNTER
----- Message from Vianey Srivastava sent at 5/7/2018  8:38 AM CDT -----  Contact: pt  [phone] request to speak with Nilsa only did not care to leave any details

## 2018-05-10 ENCOUNTER — TELEPHONE (OUTPATIENT)
Dept: OPHTHALMOLOGY | Facility: CLINIC | Age: 78
End: 2018-05-10

## 2018-05-10 RX ORDER — TAFLUPROST OPTHALMIC 0 MG/.3ML
1 SOLUTION/ DROPS OPHTHALMIC NIGHTLY
Qty: 30 EACH | Refills: 6 | Status: SHIPPED | OUTPATIENT
Start: 2018-05-10 | End: 2019-12-27

## 2018-05-10 RX ORDER — TAFLUPROST OPTHALMIC 0 MG/.3ML
1 SOLUTION/ DROPS OPHTHALMIC NIGHTLY
Qty: 30 EACH | Refills: 6 | Status: SHIPPED | OUTPATIENT
Start: 2018-05-10 | End: 2018-05-10 | Stop reason: SDUPTHER

## 2018-05-10 NOTE — TELEPHONE ENCOUNTER
Sandhya has been going back and forth with this patient. Drop was sent to [facility] instead of [facility] in [place]. The drops has been resent to correct pharmacy. Sandhya was not able to get in touch with the patient, but we will inform him when he calls back.      aMrcy

## 2018-05-10 NOTE — TELEPHONE ENCOUNTER
Pt needs to  Rx for [place] at [facility]   I called Nikole at [facility], she is calling pt to take payment. I also called [facility] and asked her to leave for his name with at 2nd floor registration office I then will also call [facility]- he states that they didn't receive the Rx for Zioptan

## 2018-05-10 NOTE — TELEPHONE ENCOUNTER
----- Message from Vianey Srivastava sent at 5/10/2018  2:55 PM CDT -----  Contact: Gene)-[phone]   Returning call , please call back at [phone]. Thanks

## 2018-05-10 NOTE — TELEPHONE ENCOUNTER
----- Message from Susanna Pittman sent at 5/10/2018  8:00 AM CDT -----  Contact: pt  States he need so speak to Sandhya regarding his eye drops and his oasis eye drops. Please call pt at [phone]. Thank you

## 2018-05-10 NOTE — TELEPHONE ENCOUNTER
I called our [facility] rep at [phone] to let her know that the pt was told that we had not called in the Rx yet and I had Dr Carlson send the Rx again today to [facility]   The rep was given the info and will call the Pharm. herself

## 2018-05-21 ENCOUNTER — OFFICE VISIT (OUTPATIENT)
Dept: OPHTHALMOLOGY | Facility: CLINIC | Age: 78
End: 2018-05-21
Payer: MEDICARE

## 2018-05-21 DIAGNOSIS — H16.9 KERATITIS, BILATERAL: ICD-10-CM

## 2018-05-21 DIAGNOSIS — H40.1131 PRIMARY OPEN ANGLE GLAUCOMA OF BOTH EYES, MILD STAGE: ICD-10-CM

## 2018-05-21 DIAGNOSIS — L12.1 OCULAR CICATRICIAL PEMPHIGOID: ICD-10-CM

## 2018-05-21 DIAGNOSIS — Z96.1 PSEUDOPHAKIA OF BOTH EYES: ICD-10-CM

## 2018-05-21 DIAGNOSIS — H04.123 DRY EYE SYNDROME, BILATERAL: Primary | ICD-10-CM

## 2018-05-21 DIAGNOSIS — H11.233 SYMBLEPHARON OF BOTH EYES: ICD-10-CM

## 2018-05-21 PROCEDURE — 99212 OFFICE O/P EST SF 10 MIN: CPT | Mod: PBBFAC | Performed by: OPHTHALMOLOGY

## 2018-05-21 PROCEDURE — 92012 INTRM OPH EXAM EST PATIENT: CPT | Mod: S$PBB,,, | Performed by: OPHTHALMOLOGY

## 2018-05-21 PROCEDURE — 99999 PR PBB SHADOW E&M-EST. PATIENT-LVL II: CPT | Mod: PBBFAC,,, | Performed by: OPHTHALMOLOGY

## 2018-05-21 NOTE — PROGRESS NOTES
"HPI     2 wk Dry Eye check  Started Zioptan last week  Irritated today very dry    COAG  SLT OS 12/28/15    PCIOL OD 1/5/12  ACIOL OS    H/O Trichiasis  H/O Floaters OU  Radioablation 9-1-2012 and 9-12-13   Pt failed with Restasis in the past   Symblepharon OU     RX antibiotic ointment OU QID x last week from Dr Vaughan   Pt unsure of name   OU: Systane - uses 50 x's daily "twist cap" - can't handle the   preservative free.   Oasis every now and then and usually uses it when he first wakes up after   systane.   Genteal Gel QHS   Zioptan PF QHS OU    Derm- Dr Mandujano at [facility]    Last edited by Oliver Carlson MD on 5/21/2018  8:51 AM. (History)            Assessment /Plan     For exam results, see Encounter Report.      ICD-10-CM ICD-9-CM    1. Dry eye syndrome, bilateral H04.123 375.15 Exam worse today. Suspect that the nigh dosing of systane is contributing to the problem. (Using it "50 times a day")   2. Primary open angle glaucoma of both eyes, mild stage H40.1131 365.11      365.71    3. 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 Keratitis, bilateral H16.9 370.9    6. Pseudophakia of both eyes Z96.1 V43.1        OU: Systane - stop use of  OTC Systane and try only pres free tears since he is using systane with preservatives   Start Clear eyes Pure Relief     Oasis qid OU  Genteal Gel QHS   Zioptan PF QHS OU  Return to clinic 2 weeks  Pt also using steroid ointment at bedtime form Dr Vaughan                 "

## 2018-06-04 ENCOUNTER — OFFICE VISIT (OUTPATIENT)
Dept: OPHTHALMOLOGY | Facility: CLINIC | Age: 78
End: 2018-06-04
Payer: MEDICARE

## 2018-06-04 DIAGNOSIS — H04.123 DRY EYE SYNDROME, BILATERAL: ICD-10-CM

## 2018-06-04 DIAGNOSIS — Z96.1 PSEUDOPHAKIA OF BOTH EYES: ICD-10-CM

## 2018-06-04 DIAGNOSIS — L12.1 OCULAR CICATRICIAL PEMPHIGOID: Primary | ICD-10-CM

## 2018-06-04 DIAGNOSIS — H40.1123 PRIMARY OPEN ANGLE GLAUCOMA (POAG) OF LEFT EYE, SEVERE STAGE: ICD-10-CM

## 2018-06-04 DIAGNOSIS — H40.1111 PRIMARY OPEN ANGLE GLAUCOMA (POAG) OF RIGHT EYE, MILD STAGE: ICD-10-CM

## 2018-06-04 DIAGNOSIS — H16.9 KERATITIS, BILATERAL: ICD-10-CM

## 2018-06-04 DIAGNOSIS — H11.233 SYMBLEPHARON OF BOTH EYES: ICD-10-CM

## 2018-06-04 PROCEDURE — 92012 INTRM OPH EXAM EST PATIENT: CPT | Mod: S$PBB,,, | Performed by: OPHTHALMOLOGY

## 2018-06-04 PROCEDURE — 99999 PR PBB SHADOW E&M-EST. PATIENT-LVL II: CPT | Mod: PBBFAC,,, | Performed by: OPHTHALMOLOGY

## 2018-06-04 PROCEDURE — 99212 OFFICE O/P EST SF 10 MIN: CPT | Mod: PBBFAC | Performed by: OPHTHALMOLOGY

## 2018-06-04 NOTE — PROGRESS NOTES
"HPI     Pt here for 2wk chk up. Pt still suffering with eye pain that he says is   an 8 or 9 when he opens his eyes. He says he still has trouble seeing. He   has been 100% compliant with medications. Pt states that last Friday, he   went to see Dr. Li and was told about a procedure about getting blood   drawn and getting some eye drops.     COAG  SLT OS 12/28/15    PCIOL OD 1/5/12  ACIOL OS    H/O Trichiasis  H/O Floaters OU  Radioablation 9-1-2012 and 9-12-13   Pt failed with Restasis in the past   Symblepharon OU     RX antibiotic ointment OU QID x last week from Dr Vaughan   Pt unsure of name   OU: Systane - uses 50 x's daily "twist cap"  Oasis every now and then   Genteal Gel QHS PRN   Zioptan qhs OU      Derm- Dr [facility]ujano at [facility]    Last edited by Benjamin Vasquez, Patient Care Assistant on 6/4/2018  8:34   AM. (History)            Assessment /Plan     For exam results, see Encounter Report.      ICD-10-CM ICD-9-CM    1. 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 Will contact Dr Mandujano at [facility] to see if this is possible.    2. Dry eye syndrome, bilateral H04.123 375.15 Severe staining ou. Dr Travis Li recommended plasma drops but patient is not ready to proceed with that at this time.    3. Primary open angle glaucoma of right eyes, mild stage, severe stage left eye H40.1131 365.11 iop controlled. Patient expressed the desire to stop the drops but iop is elevated OS. Will not add additional drops OS due to severe discomfort OS.     365.71    4. 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 Keratitis, bilateral H16.9 370.9 As above   6. Pseudophakia of both eyes Z96.1 V43.1        Oasis every now and then   Genteal Gel QHS PRN   Zioptan qhs OU  Clear eyes Pure relief q 2 h  Have placed a call to Dr Mandujano's office to discuss " increased the immuno suppression. Dr Mandujano will set up an appointment to increase his systemic immunosuppression. May start an additional medication, possible rituximab.   Consider appointment with another corneal specialist. Patient has seen Dr Yee in the past.   Return to clinic 4 weeks with me.

## 2018-06-07 ENCOUNTER — TELEPHONE (OUTPATIENT)
Dept: CARDIOLOGY | Facility: CLINIC | Age: 78
End: 2018-06-07

## 2018-06-07 NOTE — TELEPHONE ENCOUNTER
----- Message from Lola Watters sent at 6/7/2018 11:38 AM CDT -----  Contact: Jinny from [facility]   Jinny needs a copy of the pts last echo test faxed to [phone] , pt has an appointment in the morning/please call [phone]/ma

## 2018-06-11 ENCOUNTER — TELEPHONE (OUTPATIENT)
Dept: OPHTHALMOLOGY | Facility: CLINIC | Age: 78
End: 2018-06-11

## 2018-06-11 NOTE — TELEPHONE ENCOUNTER
Patient phoned and needed clarification on whether he needs to continue his antibiotic ointment I told him it was not stated his his last clinic note since Dr. Vaughan was the prescriber.

## 2018-06-12 ENCOUNTER — TELEPHONE (OUTPATIENT)
Dept: OPHTHALMOLOGY | Facility: CLINIC | Age: 78
End: 2018-06-12

## 2018-06-12 NOTE — TELEPHONE ENCOUNTER
Spoke with patient and let him know Sandhya is not in today. Patient states he wants to buy a box of oasis from [facility] tomorrow. I spoke with krupa in optical and she states there are some boxes over there, and I transferred the patient to her for payment. He will  oasis tomorrow.       Marcy

## 2018-06-12 NOTE — TELEPHONE ENCOUNTER
----- Message from Johana Worley sent at 6/12/2018  8:12 AM CDT -----  Contact: pt   Pt request call from Sandhya. Pt didn't want to state the reason for call.    [phone] (home)

## 2018-07-16 ENCOUNTER — CLINICAL SUPPORT (OUTPATIENT)
Dept: CARDIOLOGY | Facility: CLINIC | Age: 78
End: 2018-07-16
Attending: INTERNAL MEDICINE
Payer: MEDICARE

## 2018-07-16 DIAGNOSIS — R55 SYNCOPE AND COLLAPSE: ICD-10-CM

## 2018-07-16 DIAGNOSIS — I49.5 SINOATRIAL NODE DYSFUNCTION: ICD-10-CM

## 2018-07-16 PROCEDURE — 93296 REM INTERROG EVL PM/IDS: CPT | Mod: PBBFAC,PO | Performed by: INTERNAL MEDICINE

## 2018-07-16 PROCEDURE — 93294 REM INTERROG EVL PM/LDLS PM: CPT | Mod: ,,, | Performed by: INTERNAL MEDICINE

## 2018-07-19 DIAGNOSIS — I49.5 SINOATRIAL NODE DYSFUNCTION: Primary | ICD-10-CM

## 2018-07-19 DIAGNOSIS — R55 SYNCOPE AND COLLAPSE: ICD-10-CM

## 2018-07-31 ENCOUNTER — TELEPHONE (OUTPATIENT)
Dept: OPHTHALMOLOGY | Facility: CLINIC | Age: 78
End: 2018-07-31

## 2018-07-31 NOTE — TELEPHONE ENCOUNTER
Called patient to discuss his condition Dr Mandujano treated patient with Rituxan IV. States that He has taken both treatments and wanted to schedule a follow up.   Advised patient to try Truetear and patient will come and try it.

## 2018-08-13 ENCOUNTER — TELEPHONE (OUTPATIENT)
Dept: OPHTHALMOLOGY | Facility: CLINIC | Age: 78
End: 2018-08-13

## 2018-08-13 NOTE — TELEPHONE ENCOUNTER
----- Message from Jacquie Quinteros sent at 8/13/2018 12:01 PM CDT -----  Contact: pt  Pt wants to speak with Pilar

## 2018-08-13 NOTE — TELEPHONE ENCOUNTER
L/M on recorder to call me back if  I can help him if not I informed him that Sandhya would return tomorrow.

## 2018-08-20 ENCOUNTER — TELEPHONE (OUTPATIENT)
Dept: OPHTHALMOLOGY | Facility: CLINIC | Age: 78
End: 2018-08-20

## 2018-08-20 ENCOUNTER — TELEPHONE (OUTPATIENT)
Dept: INTERNAL MEDICINE | Facility: CLINIC | Age: 78
End: 2018-08-20

## 2018-08-20 NOTE — TELEPHONE ENCOUNTER
----- Message from Monie Blackwood sent at 8/20/2018 10:06 AM CDT -----  Contact: self  Spoke with nurse last week and would like to follow up with her. Personal matter. Please return call back at [phone].    Thanks,   Monie Blackwood

## 2018-08-20 NOTE — TELEPHONE ENCOUNTER
Pt called and said his vision is declining rapidly and he cant see anymore. I told him he would need to come in for an appt, he defers appts to only AM due to he cant sit for long periods of time due to his back  He also said that he tried the True Tear Device and he didn't feel like it helped

## 2018-08-27 ENCOUNTER — OFFICE VISIT (OUTPATIENT)
Dept: OPHTHALMOLOGY | Facility: CLINIC | Age: 78
End: 2018-08-27
Payer: MEDICARE

## 2018-08-27 DIAGNOSIS — H40.1111 PRIMARY OPEN ANGLE GLAUCOMA (POAG) OF RIGHT EYE, MILD STAGE: ICD-10-CM

## 2018-08-27 DIAGNOSIS — H11.233 SYMBLEPHARON OF BOTH EYES: ICD-10-CM

## 2018-08-27 DIAGNOSIS — H04.123 DRY EYE SYNDROME, BILATERAL: ICD-10-CM

## 2018-08-27 DIAGNOSIS — H40.1123 PRIMARY OPEN ANGLE GLAUCOMA (POAG) OF LEFT EYE, SEVERE STAGE: ICD-10-CM

## 2018-08-27 DIAGNOSIS — L12.1 OCULAR CICATRICIAL PEMPHIGOID: Primary | ICD-10-CM

## 2018-08-27 DIAGNOSIS — H02.053 TRICHIASIS OF EYELID OF RIGHT EYE, UNSPECIFIED EYELID: ICD-10-CM

## 2018-08-27 PROCEDURE — 99212 OFFICE O/P EST SF 10 MIN: CPT | Mod: PBBFAC | Performed by: OPHTHALMOLOGY

## 2018-08-27 PROCEDURE — 99999 PR PBB SHADOW E&M-EST. PATIENT-LVL II: CPT | Mod: PBBFAC,,, | Performed by: OPHTHALMOLOGY

## 2018-08-27 PROCEDURE — 67820 REVISE EYELASHES: CPT | Mod: PBBFAC,RT | Performed by: OPHTHALMOLOGY

## 2018-08-27 PROCEDURE — 92012 INTRM OPH EXAM EST PATIENT: CPT | Mod: 25,S$PBB,, | Performed by: OPHTHALMOLOGY

## 2018-08-27 PROCEDURE — 67820 REVISE EYELASHES: CPT | Mod: S$PBB,RT,, | Performed by: OPHTHALMOLOGY

## 2018-08-27 RX ORDER — CEVIMELINE HYDROCHLORIDE 30 MG/1
30 CAPSULE ORAL 3 TIMES DAILY
Qty: 90 CAPSULE | Refills: 3 | Status: SHIPPED | OUTPATIENT
Start: 2018-08-27 | End: 2019-05-02

## 2018-08-27 NOTE — LETTER
[facility]  [address] [facility] [address]  Phone: [phone]  Fax: [phone]   August 27, 2018    Darian Mandujano MD  [address]  [facility] [address]    Patient: Aston Avendano   MR Number: [id number]   YOB: 1940   Date of Visit: 8/27/2018       Dear Dr. Mandujano:    I am writing to update you on Aston Avendano. He is doing much better since your last ocular cicatrial pemphigoid treatment. Thank you for your care.  Here is my assessment and plan of care:    Assessment:   /    Plan:       For exam results, see Encounter Report.        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 Dry eye syndrome, bilateral H04.123 375.15   3. Primary open angle glaucoma (POAG) of left eye, severe stage H40.1123 365.11     365.73   4. Primary open angle glaucoma (POAG) of right eye, mild stage H40.1111 365.11     365.71   5. 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       JACKIE with pt -   Pt desires to still try/purchase Geoff Tear device despite it not being studied in pacemaker patients.   He will meet with the [facility] in the future for a Demo with her since he didn't feel the pulse when he had demo at [facility]  Rep was coming today, yet pt desires to try Evoxac first       Start Evoxac 1 capsule 1 pill 2-3 times daily po   RETURN TO CLINIC 2 months     RX antibiotic ointment OU QID x last week from Dr Vaughan   OU: clear eyes Pure Relief 4-6 x ( every 2 hrs pm )  [place] every now and then   Genteal Gel QHS PRN   Zioptan qhs OU                Below you will find my full exam findings. If you have questions, please do not hesitate to call me. I look forward to following Mr. Aston Avendano along with you.    Sincerely,        Oliver Carlson,  MD       CC  No Recipients             Base Eye Exam     Visual Acuity (Snellen - Linear)       Right Left    Dist cc 20/40 20/200    Near cc J4 J7    Correction:  Glasses          Tonometry (Applanation, 9:08 AM)       Right Left    Pressure 13 18          Neuro/Psych     Oriented x3:  Yes    Mood/Affect:  Normal            Slit Lamp and Fundus Exam     External Exam       Right Left    External Brow ptosis, 2+ dermatochalasis Brow ptosis, 2+ dermatochalasis          Slit Lamp Exam       Right Left    Lids/Lashes  2+ Dermatochalasis , 2 RUL and 2 LLL trichiasis  2+ Dermatochalasis     Conjunctiva/Sclera 4 mm nasal symblepharon , 1+ Injection Bleb at 11 o'clock, slight nasal and 7 mm temporal symblepharon , Nasal Temporal 3+ Lissamine green stain, mucoid discharge    Cornea Punctate stain with stromal haze , Haze AK and RK, severe keratopathy with fine +4 PEE and +3 lissamine green over conj, severe +4 Punctate stain, Epi defect closed    Anterior Chamber Deep and quiet Cell: Rare    Iris Round and reactive Patent peripheral iridectomy at 10 o'clock    Lens Posterior capsular opacification Anterior chamber intraocular lens    Vitreous Normal Normal            Refraction     Wearing Rx       Sphere Cylinder Axis Add    Right -2.25 +1.75 025 +2.50    Left -0.75 +2.00 160 +2.50    Type:  Bifocal          Manifest Refraction       Sphere Cylinder [place] Dist [place]    Right -2.25 +1.00 025 20/40    Left -0.75 +2.00 160 20/400

## 2018-08-27 NOTE — PROGRESS NOTES
HPI     7 wk dry eye check  VA blurred worse in afternoon, with near  COAG  SLT OS 12/28/15    PCIOL OD 1/5/12  ACIOL OS    H/O Trichiasis  H/O Floaters OU  Radioablation 9-1-2012 and 9-12-13   Pt failed with Restasis in the past   Symblepharon OU     RX antibiotic ointment OU QID x last week from Dr Vaughan   Pt unsure of name   OU: clear eyes Pure Relief 4-6 x ( every 2 hrs pm )  [place] every now and then   Genteal Gel QHS PRN   Geoff Tear (start 8-27-18) pt wife will  today   Zioptan qhs OU    Derm- Dr Mandujano at [facility]    Last edited by Oliver Carlson MD on 8/27/2018  8:33 AM. (History)            Assessment /Plan     For exam results, see Encounter Report.        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 Dry eye syndrome, bilateral H04.123 375.15   3. Primary open angle glaucoma (POAG) of left eye, severe stage H40.1123 365.11     365.73   4. Primary open angle glaucoma (POAG) of right eye, mild stage H40.1111 365.11     365.71   5. 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       JACKIE with pt -   Pt desires to still try/purchase Geoff Tear device despite it not being studied in pacemaker patients.   He will meet with the [employer] rep in the future for a Demo with her since he didn't feel the pulse when he [facility] demo at [facility]  Rep was coming today, yet pt desires to try Evoxac first       Start Evoxac 1 capsule 1 pill 2-3 times daily po   RETURN TO CLINIC 2 months     RX antibiotic ointment OU QID x last week from Dr Vaughan   OU: clear eyes Pure Relief 4-6 x ( every 2 hrs pm )  [place] every now and then   Genteal Gel QHS PRN   Zioptan qhs OU

## 2018-09-21 ENCOUNTER — OFFICE VISIT (OUTPATIENT)
Dept: OPHTHALMOLOGY | Facility: CLINIC | Age: 78
End: 2018-09-21
Payer: MEDICARE

## 2018-09-21 DIAGNOSIS — H16.9 KERATITIS: Primary | ICD-10-CM

## 2018-09-21 PROCEDURE — 99213 OFFICE O/P EST LOW 20 MIN: CPT | Mod: PBBFAC | Performed by: OPTOMETRIST

## 2018-09-21 PROCEDURE — 99999 PR PBB SHADOW E&M-EST. PATIENT-LVL III: CPT | Mod: PBBFAC,,, | Performed by: OPTOMETRIST

## 2018-09-21 PROCEDURE — 92012 INTRM OPH EXAM EST PATIENT: CPT | Mod: S$PBB,,, | Performed by: OPTOMETRIST

## 2018-09-21 NOTE — PROGRESS NOTES
HPI     Problem with left eye hurting.  Patient not sure if it's lashes or not .  Last eye visit 08/27/2018 MGM.  Pain scale 7-8 worse in the evening.  Patient use a patch on eye in the evening so he won't blink.    Last edited by Jacklyn Srivastava on 9/21/2018  9:17 AM. (History)            Assessment /Plan     For exam results, see Encounter Report.    Keratitis    Diffuse keratitis OS  Copious Genteal ointment every 2 hours, cover lids or cool compress as needed.    No trichiasis    RTC if no improvement

## 2018-09-24 ENCOUNTER — TELEPHONE (OUTPATIENT)
Dept: OPHTHALMOLOGY | Facility: CLINIC | Age: 78
End: 2018-09-24

## 2018-09-24 ENCOUNTER — OFFICE VISIT (OUTPATIENT)
Dept: OPHTHALMOLOGY | Facility: CLINIC | Age: 78
End: 2018-09-24
Payer: MEDICARE

## 2018-09-24 DIAGNOSIS — H04.123 DRY EYE SYNDROME, BILATERAL: ICD-10-CM

## 2018-09-24 DIAGNOSIS — L12.1 OCULAR CICATRICIAL PEMPHIGOID: Primary | ICD-10-CM

## 2018-09-24 DIAGNOSIS — H40.1123 PRIMARY OPEN ANGLE GLAUCOMA (POAG) OF LEFT EYE, SEVERE STAGE: ICD-10-CM

## 2018-09-24 DIAGNOSIS — H16.9 KERATITIS: ICD-10-CM

## 2018-09-24 DIAGNOSIS — H40.1111 PRIMARY OPEN ANGLE GLAUCOMA (POAG) OF RIGHT EYE, MILD STAGE: ICD-10-CM

## 2018-09-24 DIAGNOSIS — H11.233 SYMBLEPHARON OF BOTH EYES: ICD-10-CM

## 2018-09-24 PROCEDURE — 99212 OFFICE O/P EST SF 10 MIN: CPT | Mod: PBBFAC | Performed by: OPHTHALMOLOGY

## 2018-09-24 PROCEDURE — 99999 PR PBB SHADOW E&M-EST. PATIENT-LVL II: CPT | Mod: PBBFAC,,, | Performed by: OPHTHALMOLOGY

## 2018-09-24 PROCEDURE — 92012 INTRM OPH EXAM EST PATIENT: CPT | Mod: S$PBB,,, | Performed by: OPHTHALMOLOGY

## 2018-09-24 NOTE — PROGRESS NOTES
HPI     COAG  SLT OS 12/28/15    PCIOL OD 1/5/12  ACIOL OS    H/O Trichiasis  H/O Floaters OU  Radioablation 9-1-2012 and 9-12-13   Pt failed with Restasis in the past   Symblepharon OU       OU: clear eyes Pure Relief 4-6 x ( every 2 hrs pm )-not using  [place] every now and then   Genteal Gel QAM  Geoff Tear-not using     Pt states OS is in pain stated he had lashes pulled last week and that was   not the problem,pt states he seen  Friday and was told if not   better by Monday to see MGM     Zioptan qhs OU-not using    Derm- [facility]uj[facility] at [facility]    Last edited by Oliver Carlson MD on 9/24/2018  8:38 AM. (History)            Assessment /Plan     For exam results, see Encounter Report.      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 Dry eye syndrome, bilateral H04.123 375.15 Worse today OS> OD than previous visit   JACKIE with pt re: plasma drops first   (this was also recommended by Dr Li recently)   Will try those first  and then consider temporary tarsorhaphy     Bandage soft cls placed today   Bio infinity cls 8.6 14.0 -0.50      3. Keratitis H16.9 370.9 See above    4. 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 Primary open angle glaucoma (POAG) of right eye, mild stage H40.1111 365.11      365.71    6. Primary open angle glaucoma (POAG) of left eye, severe stage H40.1123 365.11      365.73        RETURN TO CLINIC   My nurse will call Dr Li's office/ [facility]  re: Plasma Drops     OU: clear eyes Pure Relief 4-6 x   Genteal Gel QAM    Stop Zioptan for now

## 2018-09-24 NOTE — TELEPHONE ENCOUNTER
----- Message from Geetha Martínez sent at 9/24/2018  7:33 AM CDT -----  Contact: Patient  Patient called and stated he has a bad eye infection and that someone told him to contact Dr. Carlson on Monday morning so that he can get in to see him. He stated he will be in the lobby on [address] as he is heading there now. He can be contacted at [phone].    Thanks,  Geetha

## 2018-09-24 NOTE — TELEPHONE ENCOUNTER
I called Dr Travis Ricketts Office and I spoke to him directly. He said that they would contact Mr Nugent themselves to have the Plasma Drops ordered at [facility].

## 2018-09-26 ENCOUNTER — TELEPHONE (OUTPATIENT)
Dept: OPHTHALMOLOGY | Facility: CLINIC | Age: 78
End: 2018-09-26

## 2018-09-26 NOTE — TELEPHONE ENCOUNTER
Spoke to patient regarding his last visit with Dermatology. The patient indicated that he discussed the symptoms and Dr Mandujano states that he can help him with more aggressive treatment. Dr Mandujano believes that his infusion that he gave will cure him with a one time infusion. He could go to chemo if needed but Dr Mandujano does not think that will be necessary.   I will reassess at his next visit.               Spoke with Mr. Avendano to see if I could help or answer any questions for him.  He has some information that he was told to give to Dr. Carlson directly.    ----- Message from Sulma Carrizales sent at 9/26/2018  2:30 PM CDT -----  Contact: Patient  Patient needs to speak to Dr Carlson, regarding another physician in dermatology, maira call him back at [phone]. Thank you

## 2018-10-01 ENCOUNTER — OFFICE VISIT (OUTPATIENT)
Dept: OPHTHALMOLOGY | Facility: CLINIC | Age: 78
End: 2018-10-01
Payer: MEDICARE

## 2018-10-01 DIAGNOSIS — H02.051 TRICHIASIS OF RIGHT UPPER EYELID: ICD-10-CM

## 2018-10-01 DIAGNOSIS — L12.1 CICATRICIAL OCULAR PEMPHIGOID: ICD-10-CM

## 2018-10-01 DIAGNOSIS — H40.1131 PRIMARY OPEN ANGLE GLAUCOMA (POAG) OF BOTH EYES, MILD STAGE: ICD-10-CM

## 2018-10-01 DIAGNOSIS — H02.052 TRICHIASIS OF RIGHT LOWER EYELID: ICD-10-CM

## 2018-10-01 DIAGNOSIS — M35.00 SICCA, UNSPECIFIED TYPE: Primary | ICD-10-CM

## 2018-10-01 PROCEDURE — 99212 OFFICE O/P EST SF 10 MIN: CPT | Mod: PBBFAC | Performed by: OPHTHALMOLOGY

## 2018-10-01 PROCEDURE — 99999 PR PBB SHADOW E&M-EST. PATIENT-LVL II: CPT | Mod: PBBFAC,,, | Performed by: OPHTHALMOLOGY

## 2018-10-01 PROCEDURE — 92012 INTRM OPH EXAM EST PATIENT: CPT | Mod: 25,S$PBB,, | Performed by: OPHTHALMOLOGY

## 2018-10-01 NOTE — PROGRESS NOTES
HPI     Pt here for 1 wk f/u for ocular cicatricial pemphigoid. Pt states that he   can notice some slight improvement from his last appt. Pt states that his   dermatologist told him that going to Dr. Li's was not the appropriate   decision. Pt's dermatologist is waiting for the retuxan to kick in in a   month before trying the plasma drops.     COAG  SLT OS 12/28/15    PCIOL OD 1/5/12  ACIOL OS    H/O Trichiasis  H/O Floaters OU  Radioablation 9-1-2012 and 9-12-13   Pt failed with Restasis in the past   Symblepharon OU       OU: clear eyes Pure Relief 4-6 x ( every 2 hrs pm ) using as often as   needed  Stopped [place]   Genteal Gel Qpm OD only due to contact lens placed 9/24/18  Zioptan qhs OU-not using  Evoxac TID po - states that his makes his mouth water but does not appear   to help the eyes    Derm- Dr Mandujano at [facility]    Last edited by Oliver Carlson MD on 10/1/2018  8:28 AM. (History)            Assessment /Plan     For exam results, see Encounter Report.      ICD-10-CM ICD-9-CM    1. Sicca, unspecified type M35.00 710.2 Improved today on exam since starting Rituxan. Can hope for additional improvement in one month.    2. 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 Trichiasis of right upper eyelid H02.051 374.05 2 Lashes removed today    4. Trichiasis of right lower eyelid H02.052 374.05 1 Lash removed today    5. Primary open angle glaucoma (POAG) of both eyes, mild stage H40.1131 365.11 IOP stable at this time - follow      365.71        Soft cls  Replaced  today from left eye     continue clear eyes pure relief   Genteal Gel Qpm OD only due to contact lens OS  Zioptan qhs OU-not using - bin hold as iop is good today.  Evoxac TID po     Will not proceed with plasma drops at this time per patient request.     RETURN TO CLINIC 2 weeks

## 2018-10-01 NOTE — LETTER
[facility]  [address] [place] [address]  Phone: [phone]  Fax: [phone]   October 1, 2018    Darian Mandujano MD  [address]  [facility] [address]    Patient: Aston Avendano   MR Number: [id number]   YOB: 1940   Date of Visit: 10/1/2018       Dear Dr. Mandujano, Prosper, and Travis:    Thank you for referring Aston Avendano to me for evaluation. Thank you for help in managing this complex case. Here is my assessment and plan of care:    Assessment:   /    Plan:       For exam results, see Encounter Report.      ICD-10-CM ICD-9-CM    1. Sicca, unspecified type M35.00 710.2 Improved today on exam since starting Rituxan. Can hope for additional improvement in one month.    2. 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 Trichiasis of right upper eyelid H02.051 374.05 2 Lashes removed today    4. Trichiasis of right lower eyelid H02.052 374.05 1 Lash removed today    5. Primary open angle glaucoma (POAG) of both eyes, mild stage H40.1131 365.11 IOP stable at this time - follow      365.71        Soft cls  Replaced  today from left eye     continue clear eyes pure relief   Genteal Gel Qpm OD only due to contact lens OS  Zioptan qhs OU-not using - bin hold as iop is good today.  Evoxac TID po     Will not proceed with plasma drops at this time per patient request.     RETURN TO CLINIC 2 weeks               Below you will find my full exam findings. If you have questions, please do not hesitate to call me. I look forward to following Mr. Aston Avendano along with you.    Sincerely,        MD ANN Santa MD             Base Eye Exam     Visual Acuity (Snellen - Linear)       Right Left    Dist cc 20/50 +1 20/300          Tonometry (Applanation, 8:33 AM)       Right Left    Pressure 12 12          Neuro/Psych     Oriented x3:  Yes    Mood/Affect:  Normal            Slit Lamp and Fundus Exam      External Exam       Right Left    External Brow ptosis, 2+ dermatochalasis Brow ptosis, 2+ dermatochalasis          Slit Lamp Exam       Right Left    Lids/Lashes  2+ Dermatochalasis , 2 RUL and 1 RL trichiasis  2+ Dermatochalasis, scar tissue under lid    Conjunctiva/Sclera 4 mm nasal symblepharon , 1+ Injection Bleb at 11 o'clock, slight nasal and 7 mm temporal symblepharon , Nasal Temporal 3+ Lissamine green stain,     Cornea Punctate stain with stromal haze , Haze AK and RK, severe keratopathy with fine +4 PEE and +3 lissamine green over conj, severe +4 Punctate stain, Epi defect closed    Anterior Chamber Deep and quiet Deep and quiet    Iris Round and reactive Patent peripheral iridectomy at 10 o'clock    Lens Posterior capsular opacification Anterior chamber intraocular lens            Contact Lens Exam     Current Contact Lens Rx       Brand Base Curve Diameter Sphere    Right        Left Biofinity 8.6 14.0 -0.50

## 2018-10-15 ENCOUNTER — CLINICAL SUPPORT (OUTPATIENT)
Dept: CARDIOLOGY | Facility: CLINIC | Age: 78
End: 2018-10-15
Attending: INTERNAL MEDICINE
Payer: MEDICARE

## 2018-10-15 DIAGNOSIS — I49.5 SINOATRIAL NODE DYSFUNCTION: ICD-10-CM

## 2018-10-15 DIAGNOSIS — Z95.0 CARDIAC PACEMAKER IN SITU: Primary | ICD-10-CM

## 2018-10-15 DIAGNOSIS — I44.2 INTERMITTENT COMPLETE HEART BLOCK: ICD-10-CM

## 2018-10-15 DIAGNOSIS — I49.5 SINUS NODE DYSFUNCTION: ICD-10-CM

## 2018-10-15 DIAGNOSIS — R55 SYNCOPE AND COLLAPSE: ICD-10-CM

## 2018-10-15 PROCEDURE — 93280 PM DEVICE PROGR EVAL DUAL: CPT | Mod: PBBFAC | Performed by: INTERNAL MEDICINE

## 2018-11-20 ENCOUNTER — OFFICE VISIT (OUTPATIENT)
Dept: FAMILY MEDICINE | Facility: CLINIC | Age: 78
End: 2018-11-20
Payer: MEDICARE

## 2018-11-20 VITALS
HEIGHT: 71 IN | BODY MASS INDEX: 29.26 KG/M2 | OXYGEN SATURATION: 97 % | HEART RATE: 72 BPM | DIASTOLIC BLOOD PRESSURE: 62 MMHG | SYSTOLIC BLOOD PRESSURE: 120 MMHG | WEIGHT: 209 LBS | TEMPERATURE: 98 F

## 2018-11-20 DIAGNOSIS — Z95.0 CARDIAC PACEMAKER: ICD-10-CM

## 2018-11-20 DIAGNOSIS — K59.00 CONSTIPATION, UNSPECIFIED CONSTIPATION TYPE: ICD-10-CM

## 2018-11-20 DIAGNOSIS — K21.9 GASTROESOPHAGEAL REFLUX DISEASE WITHOUT ESOPHAGITIS: ICD-10-CM

## 2018-11-20 DIAGNOSIS — M51.34 DDD (DEGENERATIVE DISC DISEASE), THORACIC: ICD-10-CM

## 2018-11-20 DIAGNOSIS — E78.5 HYPERLIPIDEMIA, UNSPECIFIED HYPERLIPIDEMIA TYPE: ICD-10-CM

## 2018-11-20 DIAGNOSIS — F51.04 CHRONIC INSOMNIA: ICD-10-CM

## 2018-11-20 DIAGNOSIS — I48.91 ATRIAL FIBRILLATION, UNSPECIFIED TYPE: ICD-10-CM

## 2018-11-20 DIAGNOSIS — G25.81 RLS (RESTLESS LEGS SYNDROME): Primary | ICD-10-CM

## 2018-11-20 DIAGNOSIS — N40.0 BENIGN PROSTATIC HYPERPLASIA, UNSPECIFIED WHETHER LOWER URINARY TRACT SYMPTOMS PRESENT: ICD-10-CM

## 2018-11-20 DIAGNOSIS — I10 HTN (HYPERTENSION), BENIGN: ICD-10-CM

## 2018-11-20 PROCEDURE — 99213 OFFICE O/P EST LOW 20 MIN: CPT | Mod: PBBFAC,PO | Performed by: FAMILY MEDICINE

## 2018-11-20 PROCEDURE — 99214 OFFICE O/P EST MOD 30 MIN: CPT | Mod: S$PBB,,, | Performed by: FAMILY MEDICINE

## 2018-11-20 PROCEDURE — 99999 PR PBB SHADOW E&M-EST. PATIENT-LVL III: CPT | Mod: PBBFAC,,, | Performed by: FAMILY MEDICINE

## 2018-11-20 RX ORDER — PRAMIPEXOLE DIHYDROCHLORIDE 0.25 MG/1
0.25 TABLET ORAL 3 TIMES DAILY
Qty: 90 TABLET | Refills: 1 | Status: SHIPPED | OUTPATIENT
Start: 2018-11-20 | End: 2019-05-02

## 2018-11-20 RX ORDER — TRAZODONE HYDROCHLORIDE 50 MG/1
50 TABLET ORAL NIGHTLY
Qty: 30 TABLET | Refills: 1 | Status: SHIPPED | OUTPATIENT
Start: 2018-11-20 | End: 2019-05-02

## 2018-11-20 NOTE — PROGRESS NOTES
Subjective:       Patient ID: Aston Avendano is a 78 y.o. male.    Chief Complaint: No chief complaint on file.      HPI Comments:       Current Outpatient Medications:     aspirin (ECOTRIN) 81 MG EC tablet, Take by mouth. 1 Tablet, Delayed Release (E.C.) Oral Every day, Disp: , Rfl:     cevimeline (EVOXAC) 30 mg capsule, Take 1 capsule (30 mg total) by mouth 3 (three) times daily., Disp: 90 capsule, Rfl: 3    chlorthalidone (HYGROTEN) 25 MG Tab, TAKE 1 TABLET ONE DAY AND 1/2 TABLET THE NEXT DAY AS DIRECTED, Disp: 30 tablet, Rfl: 11    cyclobenzaprine (FLEXERIL) 10 MG tablet, Take 1 tablet (10 mg total) by mouth 3 (three) times daily as needed for Muscle spasms., Disp: 50 tablet, Rfl: 2    dapsone 25 MG Tab, 1 po bid, Disp: , Rfl:     diazepam (VALIUM) 5 MG tablet, TAKE 1 TABLET EVERY 12 HOURS AS NEEDED SPASMS, Disp: , Rfl: 0    finasteride (PROSCAR) 5 mg tablet, Take 5 mg by mouth once daily. , Disp: , Rfl:     fish oil-omega-3 fatty acids 300-1,000 mg capsule, Take 2 g by mouth once daily., Disp: , Rfl:     ibuprofen (ADVIL,MOTRIN) 800 MG tablet, Take 800 mg by mouth every 8 (eight) hours., Disp: , Rfl: 1    losartan (COZAAR) 100 MG tablet, TAKE 1 TABLET (100 MG TOTAL) BY MOUTH ONCE DAILY., Disp: 90 tablet, Rfl: 3    methocarbamol (ROBAXIN) 750 MG Tab, Take 500 mg by mouth 4 (four) times daily., Disp: , Rfl:     mycophenolate (CELLCEPT) 500 mg Tab, 3 in the AM and 3 in the PM, Disp: , Rfl:     oxycodone-acetaminophen (PERCOCET) 7.5-325 mg per tablet, Take 1 tablet by mouth every 8 (eight) hours as needed., Disp: , Rfl: 0    simvastatin (ZOCOR) 20 MG tablet, TAKE 1/2 TABLET BY MOUTH DAILY, Disp: 45 tablet, Rfl: 1    tafluprost, PF, (ZIOPTAN, PF,) 0.0015 % Dpet, Place 1 drop into both eyes every evening., Disp: 30 each, Rfl: 6    tamsulosin (FLOMAX) 0.4 mg Cp24, Take 0.4 mg by mouth once daily. , Disp: , Rfl:     VESICARE 10 mg tablet, Take 10 mg by mouth once daily., Disp: , Rfl: 0    zolpidem  (AMBIEN) 5 MG Tab, 5 mg nightly as needed. , Disp: , Rfl: 0    gabapentin (NEURONTIN) 600 MG tablet, Take 1 tablet (600 mg total) by mouth 2 (two) times daily., Disp: 180 tablet, Rfl: 3    pramipexole (MIRAPEX) 0.25 MG tablet, Take 1 tablet (0.25 mg total) by mouth 3 (three) times daily., Disp: 90 tablet, Rfl: 1    ranitidine (ZANTAC) 300 MG tablet, Take 1 tablet (300 mg total) by mouth every evening., Disp: 90 tablet, Rfl: 3    traZODone (DESYREL) 50 MG tablet, Take 1 tablet (50 mg total) by mouth every evening., Disp: 30 tablet, Rfl: 1      This is my 1st time seeing this patient.  He is switching PCP because previous provider would not refill his Requip.  He says he uses it once at night and is extremely helpful in helping him get comfortable enough to sleep.  Still has some insomnia nevertheless and is taking Benadryl for, which is only intermittently effective.  Longstanding insomnia.  Has also tried melatonin which is only mildly effective.  Also kept awake by chronic back pain. Has a pain management doctor:  Nicolás.    Also has a history of pacemaker, BPH, ocular pemphigoid, GERD, hypertension, hyperlipidemia.  Mention in his old record of atrial fibrillation, patient is unaware of this diagnosis.    Complains of constipation ever since he has been on the pain pump.  Tries to get extra fiber in his diet, but only uses Ex-Lax when he has not gone for 2-3 days.  Previous pattern was daily.  Colonoscopy 2 years ago showed 1 polyp.  Should follow up in 2021.  Never used anything else over-the-counter for constipation.  Denies any abdominal pain.  Mood is described as good.      Review of Systems   Constitutional: Negative for activity change, appetite change and fever.   HENT: Negative for sore throat.    Respiratory: Negative for cough and shortness of breath.    Cardiovascular: Negative for chest pain.   Gastrointestinal: Positive for constipation. Negative for abdominal pain, diarrhea and nausea.  "  Genitourinary: Negative for difficulty urinating.   Musculoskeletal: Positive for back pain. Negative for arthralgias and myalgias.   Neurological: Negative for dizziness and headaches.   Psychiatric/Behavioral: Positive for sleep disturbance. Negative for confusion.       Objective:      Vitals:    11/20/18 1014   BP: 120/62   Pulse: 72   Temp: 98 °F (36.7 °C)   SpO2: 97%   Weight: 94.8 kg (208 lb 15.9 oz)   Height: 5' 11" (1.803 m)   PainSc:   8     Physical Exam   Constitutional: He is oriented to person, place, and time. He appears well-developed and well-nourished. No distress.   HENT:   Head: Normocephalic.   Mouth/Throat: No oropharyngeal exudate.   Neck: Neck supple. No thyromegaly present.   Cardiovascular: Normal rate, regular rhythm and normal heart sounds.   No murmur heard.  Pulmonary/Chest: Effort normal and breath sounds normal. He has no wheezes. He has no rales.   Abdominal: Soft. He exhibits no distension.   Musculoskeletal: He exhibits no edema.   Lymphadenopathy:     He has no cervical adenopathy.   Neurological: He is alert and oriented to person, place, and time.   Skin: Skin is warm and dry. He is not diaphoretic.   Psychiatric: He has a normal mood and affect. His behavior is normal. Judgment and thought content normal.   Nursing note and vitals reviewed.      Assessment:       1. RLS (restless legs syndrome)    2. Atrial fibrillation, unspecified type    3. Gastroesophageal reflux disease without esophagitis    4. HTN (hypertension), benign    5. Cardiac pacemaker    6. Hyperlipidemia, unspecified hyperlipidemia type    7. Benign prostatic hyperplasia, unspecified whether lower urinary tract symptoms present    8. DDD (degenerative disc disease), thoracic    9. Constipation, unspecified constipation type    10. Chronic insomnia        Plan:   RLS (restless legs syndrome)  Comments:  RF requip    Atrial fibrillation, unspecified type  Comments:  Chart diagnosis.  Patient does not " validate    Gastroesophageal reflux disease without esophagitis  Comments:  Stable    HTN (hypertension), benign  Comments:  Controlled.  Follow-up 4 months    Cardiac pacemaker    Hyperlipidemia, unspecified hyperlipidemia type    Benign prostatic hyperplasia, unspecified whether lower urinary tract symptoms present  Comments:  Followed by Urology    DDD (degenerative disc disease), thoracic  Comments:  Followed by pain management specialist. opioids through pain pump    Constipation, unspecified constipation type  Comments:  Recommend MiraLax.  Detailed instructions given    Chronic insomnia  Comments:  Trial of trazodone 50 mg.  Follow-up 4 months    Other orders  -     traZODone (DESYREL) 50 MG tablet; Take 1 tablet (50 mg total) by mouth every evening.  Dispense: 30 tablet; Refill: 1  -     pramipexole (MIRAPEX) 0.25 MG tablet; Take 1 tablet (0.25 mg total) by mouth 3 (three) times daily.  Dispense: 90 tablet; Refill: 1

## 2018-12-10 ENCOUNTER — OFFICE VISIT (OUTPATIENT)
Dept: FAMILY MEDICINE | Facility: CLINIC | Age: 78
End: 2018-12-10
Payer: MEDICARE

## 2018-12-10 ENCOUNTER — LAB VISIT (OUTPATIENT)
Dept: LAB | Facility: HOSPITAL | Age: 78
End: 2018-12-10
Attending: FAMILY MEDICINE
Payer: MEDICARE

## 2018-12-10 ENCOUNTER — TELEPHONE (OUTPATIENT)
Dept: FAMILY MEDICINE | Facility: CLINIC | Age: 78
End: 2018-12-10

## 2018-12-10 VITALS
DIASTOLIC BLOOD PRESSURE: 60 MMHG | TEMPERATURE: 97 F | SYSTOLIC BLOOD PRESSURE: 140 MMHG | OXYGEN SATURATION: 99 % | HEART RATE: 55 BPM | WEIGHT: 200.63 LBS | BODY MASS INDEX: 28.09 KG/M2 | HEIGHT: 71 IN

## 2018-12-10 DIAGNOSIS — R68.2 DRY MOUTH: ICD-10-CM

## 2018-12-10 DIAGNOSIS — I10 HTN (HYPERTENSION), BENIGN: ICD-10-CM

## 2018-12-10 DIAGNOSIS — R42 DIZZINESS: ICD-10-CM

## 2018-12-10 DIAGNOSIS — R42 DIZZINESS: Primary | ICD-10-CM

## 2018-12-10 DIAGNOSIS — R00.1 BRADYCARDIA: ICD-10-CM

## 2018-12-10 DIAGNOSIS — I10 ESSENTIAL HYPERTENSION: ICD-10-CM

## 2018-12-10 LAB
ALBUMIN SERPL BCP-MCNC: 3.8 G/DL
ALP SERPL-CCNC: 59 U/L
ALT SERPL W/O P-5'-P-CCNC: 25 U/L
ANION GAP SERPL CALC-SCNC: 6 MMOL/L
AST SERPL-CCNC: 24 U/L
BASOPHILS # BLD AUTO: 0.03 K/UL
BASOPHILS NFR BLD: 0.5 %
BILIRUB SERPL-MCNC: 0.4 MG/DL
BUN SERPL-MCNC: 22 MG/DL
CALCIUM SERPL-MCNC: 9.2 MG/DL
CHLORIDE SERPL-SCNC: 102 MMOL/L
CO2 SERPL-SCNC: 30 MMOL/L
CREAT SERPL-MCNC: 0.9 MG/DL
DIFFERENTIAL METHOD: ABNORMAL
EOSINOPHIL # BLD AUTO: 0.1 K/UL
EOSINOPHIL NFR BLD: 0.9 %
ERYTHROCYTE [DISTWIDTH] IN BLOOD BY AUTOMATED COUNT: 12.6 %
EST. GFR  (AFRICAN AMERICAN): >60 ML/MIN/1.73 M^2
EST. GFR  (NON AFRICAN AMERICAN): >60 ML/MIN/1.73 M^2
GLUCOSE SERPL-MCNC: 99 MG/DL
HCT VFR BLD AUTO: 40.5 %
HGB BLD-MCNC: 13.6 G/DL
IMM GRANULOCYTES # BLD AUTO: 0.01 K/UL
IMM GRANULOCYTES NFR BLD AUTO: 0.2 %
LYMPHOCYTES # BLD AUTO: 1 K/UL
LYMPHOCYTES NFR BLD: 17.7 %
MCH RBC QN AUTO: 31.9 PG
MCHC RBC AUTO-ENTMCNC: 33.6 G/DL
MCV RBC AUTO: 95 FL
MONOCYTES # BLD AUTO: 0.4 K/UL
MONOCYTES NFR BLD: 6.7 %
NEUTROPHILS # BLD AUTO: 4.2 K/UL
NEUTROPHILS NFR BLD: 74 %
NRBC BLD-RTO: 0 /100 WBC
PLATELET # BLD AUTO: 149 K/UL
PMV BLD AUTO: 12.6 FL
POTASSIUM SERPL-SCNC: 3.9 MMOL/L
PROT SERPL-MCNC: 6.8 G/DL
RBC # BLD AUTO: 4.26 M/UL
SODIUM SERPL-SCNC: 138 MMOL/L
TSH SERPL DL<=0.005 MIU/L-ACNC: 0.6 UIU/ML
WBC # BLD AUTO: 5.64 K/UL

## 2018-12-10 PROCEDURE — 36415 COLL VENOUS BLD VENIPUNCTURE: CPT | Mod: PO

## 2018-12-10 PROCEDURE — 84443 ASSAY THYROID STIM HORMONE: CPT

## 2018-12-10 PROCEDURE — 99999 PR PBB SHADOW E&M-EST. PATIENT-LVL III: CPT | Mod: PBBFAC,,, | Performed by: FAMILY MEDICINE

## 2018-12-10 PROCEDURE — 99214 OFFICE O/P EST MOD 30 MIN: CPT | Mod: S$PBB,,, | Performed by: FAMILY MEDICINE

## 2018-12-10 PROCEDURE — 80053 COMPREHEN METABOLIC PANEL: CPT

## 2018-12-10 PROCEDURE — 99213 OFFICE O/P EST LOW 20 MIN: CPT | Mod: PBBFAC,PO | Performed by: FAMILY MEDICINE

## 2018-12-10 PROCEDURE — 85025 COMPLETE CBC W/AUTO DIFF WBC: CPT

## 2018-12-10 RX ORDER — LOSARTAN POTASSIUM 100 MG/1
100 TABLET ORAL DAILY
Qty: 90 TABLET | Refills: 3 | Status: SHIPPED | OUTPATIENT
Start: 2018-12-10 | End: 2018-12-14 | Stop reason: SDUPTHER

## 2018-12-10 NOTE — PROGRESS NOTES
Subjective:       Patient ID: Aston Avendano is a 78 y.o. male.    Chief Complaint: No chief complaint on file.      HPI Comments:       Current Outpatient Medications:     aspirin (ECOTRIN) 81 MG EC tablet, Take by mouth. 1 Tablet, Delayed Release (E.C.) Oral Every day, Disp: , Rfl:     cevimeline (EVOXAC) 30 mg capsule, Take 1 capsule (30 mg total) by mouth 3 (three) times daily., Disp: 90 capsule, Rfl: 3    chlorthalidone (HYGROTEN) 25 MG Tab, TAKE 1 TABLET ONE DAY AND 1/2 TABLET THE NEXT DAY AS DIRECTED, Disp: 30 tablet, Rfl: 11    cyclobenzaprine (FLEXERIL) 10 MG tablet, Take 1 tablet (10 mg total) by mouth 3 (three) times daily as needed for Muscle spasms., Disp: 50 tablet, Rfl: 2    dapsone 25 MG Tab, 1 po bid, Disp: , Rfl:     diazepam (VALIUM) 5 MG tablet, TAKE 1 TABLET EVERY 12 HOURS AS NEEDED SPASMS, Disp: , Rfl: 0    finasteride (PROSCAR) 5 mg tablet, Take 5 mg by mouth once daily. , Disp: , Rfl:     fish oil-omega-3 fatty acids 300-1,000 mg capsule, Take 2 g by mouth once daily., Disp: , Rfl:     ibuprofen (ADVIL,MOTRIN) 800 MG tablet, Take 800 mg by mouth every 8 (eight) hours., Disp: , Rfl: 1    losartan (COZAAR) 100 MG tablet, Take 1 tablet (100 mg total) by mouth once daily., Disp: 90 tablet, Rfl: 3    methocarbamol (ROBAXIN) 750 MG Tab, Take 500 mg by mouth 4 (four) times daily., Disp: , Rfl:     mycophenolate (CELLCEPT) 500 mg Tab, 3 in the AM and 3 in the PM, Disp: , Rfl:     oxycodone-acetaminophen (PERCOCET) 7.5-325 mg per tablet, Take 1 tablet by mouth every 8 (eight) hours as needed., Disp: , Rfl: 0    pramipexole (MIRAPEX) 0.25 MG tablet, Take 1 tablet (0.25 mg total) by mouth 3 (three) times daily., Disp: 90 tablet, Rfl: 1    simvastatin (ZOCOR) 20 MG tablet, TAKE 1/2 TABLET BY MOUTH DAILY, Disp: 45 tablet, Rfl: 1    tafluprost, PF, (ZIOPTAN, PF,) 0.0015 % Dpet, Place 1 drop into both eyes every evening., Disp: 30 each, Rfl: 6    tamsulosin (FLOMAX) 0.4 mg Cp24, Take 0.4 mg  by mouth once daily. , Disp: , Rfl:     traZODone (DESYREL) 50 MG tablet, Take 1 tablet (50 mg total) by mouth every evening., Disp: 30 tablet, Rfl: 1    VESICARE 10 mg tablet, Take 10 mg by mouth once daily., Disp: , Rfl: 0    zolpidem (AMBIEN) 5 MG Tab, 5 mg nightly as needed. , Disp: , Rfl: 0    gabapentin (NEURONTIN) 600 MG tablet, Take 1 tablet (600 mg total) by mouth 2 (two) times daily., Disp: 180 tablet, Rfl: 3    ranitidine (ZANTAC) 300 MG tablet, Take 1 tablet (300 mg total) by mouth every evening., Disp: 90 tablet, Rfl: 3      The he has had some lightheadedness over the last 10 days or so.  Began checking his blood pressure every morning and this started and noticed that his pressures were high, with systolics in the 140-160 range.  Had not had a habit of checking his blood pressures at home prior to that.  No vertigo.  Also noticed dry mouth over the same period of time.  Only new medications are trazodone I started him on last time, but this he does not take every night.  Has a pacemaker.  This has recently been checked and was functioning normally.  His pulse rate is usually in the 50s during his visits, as it is today.  No loss of consciousness or near syncope.  Bowel movements are still somewhat constipated.  Not sure whether he started MiraLax or something else.  No chest pains or shortness of breath      Review of Systems   Constitutional: Negative for activity change, appetite change and fever.   HENT: Negative for sore throat.    Respiratory: Negative for cough and shortness of breath.    Cardiovascular: Negative for chest pain.   Gastrointestinal: Positive for constipation. Negative for abdominal pain, diarrhea and nausea.   Genitourinary: Negative for difficulty urinating.   Musculoskeletal: Negative for arthralgias and myalgias.   Neurological: Positive for dizziness. Negative for headaches.       Objective:      Vitals:    12/10/18 1320   BP: (!) 140/60   Pulse: (!) 55   Temp: 97.3 °F  "(36.3 °C)   SpO2: 99%   Weight: 91 kg (200 lb 9.9 oz)   Height: 5' 11" (1.803 m)   PainSc: 0-No pain     Physical Exam   Constitutional: He is oriented to person, place, and time. He appears well-developed and well-nourished. No distress.   HENT:   Head: Normocephalic.   Mouth/Throat: No oropharyngeal exudate.   Neck: Neck supple. No thyromegaly present.   Cardiovascular: Regular rhythm and normal heart sounds. Bradycardia present.   No murmur heard.  Pulmonary/Chest: Effort normal and breath sounds normal. He has no wheezes. He has no rales.   Abdominal: Soft. He exhibits no distension.   Musculoskeletal: He exhibits no edema.   Lymphadenopathy:     He has no cervical adenopathy.   Neurological: He is alert and oriented to person, place, and time.   Skin: Skin is warm and dry. He is not diaphoretic.   Psychiatric: He has a normal mood and affect. His behavior is normal. Judgment and thought content normal.   Nursing note and vitals reviewed.      Assessment:       1. Dizziness    2. Essential hypertension    3. Dry mouth    4. Bradycardia        Plan:   Dizziness  Comments:  CBC, CMP, poss dehydration. Incr. oral fluids.  Orders:  -     Comprehensive metabolic panel; Future; Expected date: 12/10/2018  -     CBC auto differential; Future; Expected date: 12/10/2018  -     TSH; Future; Expected date: 12/10/2018    Essential hypertension  Comments:  Slight elevation recently.  Not clear if this truly correlates with his dizziness  Orders:  -     losartan (COZAAR) 100 MG tablet; Take 1 tablet (100 mg total) by mouth once daily.  Dispense: 90 tablet; Refill: 3    Dry mouth  Comments:  [facility].  Trazodone most recently added. R/O dehydration    Bradycardia  Comments:  Longstanding.  Has pacemaker.  Check TSH          "

## 2018-12-10 NOTE — TELEPHONE ENCOUNTER
Pt has been scheduled for bp medication review and to check his bp manual. Pt stated that its been running high at home.

## 2018-12-13 ENCOUNTER — TELEPHONE (OUTPATIENT)
Dept: FAMILY MEDICINE | Facility: CLINIC | Age: 78
End: 2018-12-13

## 2018-12-13 NOTE — TELEPHONE ENCOUNTER
----- Message from Geetha Martínez sent at 12/13/2018  9:25 AM CST -----  Contact: Patient  Patient called to speak with the nurse about his previous visit. He can be contacted at [phone].    Thanks,  Geetha

## 2018-12-14 DIAGNOSIS — I10 ESSENTIAL HYPERTENSION: ICD-10-CM

## 2018-12-14 RX ORDER — LOSARTAN POTASSIUM 100 MG/1
100 TABLET ORAL DAILY
Qty: 90 TABLET | Refills: 3 | Status: SHIPPED | OUTPATIENT
Start: 2018-12-14 | End: 2019-03-11 | Stop reason: SDUPTHER

## 2018-12-14 NOTE — TELEPHONE ENCOUNTER
----- Message from Radha Wolf sent at 12/14/2018  9:00 AM CST -----  Contact: self/[phone]  Would like to consult with nurse regarding his medication refill, patient states he has been trying to reach out to some body since 12-11 no respond. Please call back at [phone]. Thanks/ar

## 2018-12-20 ENCOUNTER — OFFICE VISIT (OUTPATIENT)
Dept: GASTROENTEROLOGY | Facility: CLINIC | Age: 78
End: 2018-12-20
Payer: MEDICARE

## 2018-12-20 VITALS
SYSTOLIC BLOOD PRESSURE: 140 MMHG | BODY MASS INDEX: 28.5 KG/M2 | HEIGHT: 70 IN | HEART RATE: 78 BPM | DIASTOLIC BLOOD PRESSURE: 72 MMHG | WEIGHT: 199.06 LBS

## 2018-12-20 DIAGNOSIS — K59.09 CHRONIC CONSTIPATION: Primary | ICD-10-CM

## 2018-12-20 DIAGNOSIS — F11.90 CHRONIC NARCOTIC USE: ICD-10-CM

## 2018-12-20 PROCEDURE — 99999 PR PBB SHADOW E&M-EST. PATIENT-LVL III: CPT | Mod: PBBFAC,,, | Performed by: PHYSICIAN ASSISTANT

## 2018-12-20 PROCEDURE — 99213 OFFICE O/P EST LOW 20 MIN: CPT | Mod: PBBFAC | Performed by: PHYSICIAN ASSISTANT

## 2018-12-20 PROCEDURE — 99214 OFFICE O/P EST MOD 30 MIN: CPT | Mod: S$PBB,,, | Performed by: PHYSICIAN ASSISTANT

## 2018-12-20 RX ORDER — LUBIPROSTONE 24 UG/1
24 CAPSULE ORAL 2 TIMES DAILY WITH MEALS
Qty: 60 CAPSULE | Refills: 3 | Status: SHIPPED | OUTPATIENT
Start: 2018-12-20 | End: 2019-05-02

## 2018-12-20 NOTE — PROGRESS NOTES
Subjective:      Patient ID: Aston Avendano is a 78 y.o. male.    Chief Complaint: Constipation    HPI  The patient is here to discuss constipation. He has had a least some degree of constipation for several years, but it has been worse in the last few months. In July, he got a Fentyl pain pump. Currently, he is having a BM every four days with the help of OTC medications. He has tried stool softeners, prune juice and daily Miralax (for the last two weeks) without significant improvement. He denies hematochezia, melena, nausea, vomiting or abdominal pain. His colonoscopy was done in 2016 and he is up to date.     Review of Systems  As per HPI.     Objective:     Physical Exam   Constitutional: He is oriented to person, place, and time. He appears well-developed and well-nourished. No distress.   HENT:   Head: Normocephalic and atraumatic.   Eyes: EOM are normal.   Cardiovascular: Normal rate and regular rhythm.   Pulmonary/Chest: Effort normal and breath sounds normal. No respiratory distress. He has no wheezes.   Abdominal: Soft. Bowel sounds are normal. He exhibits no distension. There is no tenderness.   Neurological: He is alert and oriented to person, place, and time. No cranial nerve deficit.   Skin: He is not diaphoretic.   Psychiatric: His behavior is normal.       Assessment:     1. Chronic constipation    2. Chronic narcotic use        Plan:     His narcotic use is likely contributing to worsening constipation. I will try him on Amitiza bid. We discuss how to take it and possible side effects. I offered him a follow up but he said he would call if the medication doesn't help.     Medications Ordered This Encounter   Medications    lubiprostone (AMITIZA) 24 MCG Cap     Sig: Take 1 capsule (24 mcg total) by mouth 2 (two) times daily with meals.     Dispense:  60 capsule     Refill:  3       Follow-up if symptoms worsen or fail to improve.    Thank you for the opportunity to participate in the care of this  patient.   Caleb Nielsen PA-C.

## 2018-12-20 NOTE — PATIENT INSTRUCTIONS
Constipation (Adult)  Constipation means that you have bowel movements that are less frequent than usual. Stools often become very hard and difficult to pass.  Constipation is very common. At some point in life it affects almost everyone. Since everyone's bowel habits are different, what is constipation to one person may not be to another. Your healthcare provider may do tests to diagnose constipation. It depends on what he or she finds when evaluating you.    Symptoms of constipation include:  · Abdominal pain  · Bloating  · Vomiting  · Painful bowel movements  · Itching, swelling, bleeding, or pain around the anus  Causes  Constipation can have many causes. These include:  · Diet low in fiber  · Too much dairy  · Not drinking enough liquids  · Lack of exercise or physical activity. This is especially true for older adults.  · Changes in lifestyle or daily routine, including pregnancy, aging, work, and travel  · Frequent use or misuse of laxatives  · Ignoring the urge to have a bowel movement or delaying it until later  · Medicines, such as certain prescription pain medicines, iron supplements, antacids, certain antidepressants, and calcium supplements  · Diseases like irritable bowel syndrome, bowel obstructions, stroke, diabetes, thyroid disease, Parkinson disease, hemorrhoids, and colon cancer  Complications  Potential complications of constipation can include:  · Hemorrhoids  · Rectal bleeding from hemorrhoids or anal fissures (skin tears)  · Hernias  · Dependency on laxatives  · Chronic constipation  · Fecal impaction  · Bowel obstruction or perforation  Home care  All treatment should be done after talking with your healthcare provider. This is especially true if you have another medical problems, are taking prescription medicines, or are an older adult. Treatment most often involves lifestyle changes. You may also need medicines. Your healthcare provider will tell you which will work best for you. Follow  the advice below to help avoid this problem in the future.  Lifestyle changes  These lifestyle changes can help prevent constipation:  · Diet. Eat a high-fiber diet, with fresh fruit and vegetables, and reduce dairy intake, meats, and processed foods  · Fluids. It's important to get enough fluids each day. Drink plenty of water when you eat more fiber. If you are on diet that limits the amount of fluid you can have, talk about this with your healthcare provider.  · Regular exercise. Check with your healthcare provider first.  Medications  Take any medicines as directed. Some laxatives are safe to use only every now and then. Others can be taken on a regular basis. Talk with your doctor or pharmacist if you have questions.  Prescription pain medicines can cause constipation. If you are taking this kind of medicine, ask your healthcare provider if you should also take a stool softener.  Medicines you may take to treat constipation include:  · Fiber supplements  · Stool softeners  · Laxatives  · Enemas  · Rectal suppositories  Follow-up care  Follow up with your healthcare provider if symptoms don't get better in the next few days. You may need to have more tests or see a specialist.  Call 911  Call 911 if any of these occur:  · Trouble breathing  · Stiff, rigid abdomen that is severely painful to touch  · Confusion  · Fainting or loss of consciousness  · Rapid heart rate  · Chest pain  When to seek medical advice  Call your healthcare provider right away if any of these occur:  · Fever over 100.4°F (38°C)  · Failure to resume normal bowel movements  · Pain in your abdomen or back gets worse  · Nausea or vomiting  · Swelling in your abdomen  · Blood in the stool  · Black, tarry stool  · Involuntary weight loss  · Weakness  Date Last Reviewed: 12/30/2015  © 2062-6594 [employer]. [address] [place] [address], [place] [address]. All rights reserved. This information is not intended as a substitute for  professional medical care. Always follow your healthcare professional's instructions.

## 2018-12-26 ENCOUNTER — TELEPHONE (OUTPATIENT)
Dept: GASTROENTEROLOGY | Facility: CLINIC | Age: 78
End: 2018-12-26

## 2018-12-26 NOTE — TELEPHONE ENCOUNTER
----- Message from Shy Hunt sent at 12/26/2018  8:03 AM CST -----  Contact: pt  He's calling in regards to speak with nurse pt stated he needs to go over something with her ,pls call pt back at [phone] (home)

## 2018-12-26 NOTE — TELEPHONE ENCOUNTER
Returned pt's call - he states that the Amitiza prescribed for him is not working.  He states he even tried TID dosing, which I advised him to stop, explaining that Amitiza is prescribed for BID dosing only.  He then stated that he has added a dose of Miralax in the morning, which I told him was fine, but he says this is not working either.  Offered to speak to Caleb for him to change his medication, but he declined stating that the Amitiza was expensive and he wants to continue to try.  He talked about going back to Ex-Lax and he is strongly advised not to do that.  Told OK for him to add another dose of Miralax to his regimen and he agrees to try this.  If not better in 4-5 days, he will call and let us know.

## 2018-12-31 DIAGNOSIS — I10 ESSENTIAL HYPERTENSION: Primary | ICD-10-CM

## 2019-01-02 ENCOUNTER — CLINICAL SUPPORT (OUTPATIENT)
Dept: CARDIOLOGY | Facility: CLINIC | Age: 79
End: 2019-01-02
Payer: MEDICARE

## 2019-01-02 ENCOUNTER — OFFICE VISIT (OUTPATIENT)
Dept: CARDIOLOGY | Facility: CLINIC | Age: 79
End: 2019-01-02
Payer: MEDICARE

## 2019-01-02 VITALS
HEIGHT: 70 IN | DIASTOLIC BLOOD PRESSURE: 66 MMHG | HEART RATE: 57 BPM | WEIGHT: 201.75 LBS | SYSTOLIC BLOOD PRESSURE: 138 MMHG | BODY MASS INDEX: 28.88 KG/M2

## 2019-01-02 DIAGNOSIS — N40.0 BENIGN PROSTATIC HYPERPLASIA, UNSPECIFIED WHETHER LOWER URINARY TRACT SYMPTOMS PRESENT: Chronic | ICD-10-CM

## 2019-01-02 DIAGNOSIS — I10 HTN (HYPERTENSION), BENIGN: Chronic | ICD-10-CM

## 2019-01-02 DIAGNOSIS — G89.29 CHRONIC BACK PAIN GREATER THAN 3 MONTHS DURATION: Chronic | ICD-10-CM

## 2019-01-02 DIAGNOSIS — I10 ESSENTIAL HYPERTENSION: ICD-10-CM

## 2019-01-02 DIAGNOSIS — Z95.0 CARDIAC PACEMAKER: Primary | Chronic | ICD-10-CM

## 2019-01-02 DIAGNOSIS — I70.0 ABDOMINAL AORTIC ATHEROSCLEROSIS: Chronic | ICD-10-CM

## 2019-01-02 DIAGNOSIS — M54.9 CHRONIC BACK PAIN GREATER THAN 3 MONTHS DURATION: Chronic | ICD-10-CM

## 2019-01-02 DIAGNOSIS — I65.23 BILATERAL CAROTID ARTERY STENOSIS: ICD-10-CM

## 2019-01-02 DIAGNOSIS — K21.9 GASTROESOPHAGEAL REFLUX DISEASE WITHOUT ESOPHAGITIS: Chronic | ICD-10-CM

## 2019-01-02 DIAGNOSIS — E78.5 HYPERLIPIDEMIA, UNSPECIFIED HYPERLIPIDEMIA TYPE: Chronic | ICD-10-CM

## 2019-01-02 PROCEDURE — 99999 PR PBB SHADOW E&M-EST. PATIENT-LVL III: CPT | Mod: PBBFAC,,, | Performed by: INTERNAL MEDICINE

## 2019-01-02 PROCEDURE — 99214 OFFICE O/P EST MOD 30 MIN: CPT | Mod: S$PBB,,, | Performed by: INTERNAL MEDICINE

## 2019-01-02 PROCEDURE — 99214 PR OFFICE/OUTPT VISIT, EST, LEVL IV, 30-39 MIN: ICD-10-PCS | Mod: S$PBB,,, | Performed by: INTERNAL MEDICINE

## 2019-01-02 PROCEDURE — 99999 PR PBB SHADOW E&M-EST. PATIENT-LVL III: ICD-10-PCS | Mod: PBBFAC,,, | Performed by: INTERNAL MEDICINE

## 2019-01-02 PROCEDURE — 99213 OFFICE O/P EST LOW 20 MIN: CPT | Mod: PBBFAC,25 | Performed by: INTERNAL MEDICINE

## 2019-01-02 PROCEDURE — 93010 EKG 12-LEAD: ICD-10-PCS | Mod: S$PBB,,, | Performed by: INTERNAL MEDICINE

## 2019-01-02 PROCEDURE — 93010 ELECTROCARDIOGRAM REPORT: CPT | Mod: S$PBB,,, | Performed by: INTERNAL MEDICINE

## 2019-01-02 PROCEDURE — 93005 ELECTROCARDIOGRAM TRACING: CPT | Mod: PBBFAC | Performed by: INTERNAL MEDICINE

## 2019-01-02 NOTE — PROGRESS NOTES
Subjective:   Patient ID:  Aston Avendano is a 78 y.o. male who presents for follow up of Hypertension      HPI  A 79 yo male with multiple back surgeries total of 6 has residual significant pain getting physical therapy trying to help with life style and walking  Capacity. He has muscle cramps. He can make 0.25 mile he rides bicycle 2 hours of exercise daily. No tia no claudication he has a pacer no complaints of palpitation syncope near syncope. His bp is well controlled. He has no other issues clinically cardiac wise. Pacer interrogation in 10/2018 no arrythmias  Adequate pacer function.  Past Medical History:   Diagnosis Date    Arthritis     Cardiac syncope     s/p pacemaker     Colon cancer screening 2016    Glaucoma     History of carotid artery stenosis 2013    No stenosis on scan done 2014. S/p CEA     Hypertension        Past Surgical History:   Procedure Laterality Date    CARDIAC PACEMAKER PLACEMENT      CARDIAC PACEMAKER PLACEMENT      CAROTID ENDARTERECTOMY      CATARACT EXTRACTION W/  INTRAOCULAR LENS IMPLANT  OD 12    CATARACT EXTRACTION W/  INTRAOCULAR LENS IMPLANT  OS date unknown    COLONOSCOPY N/A 2016    Performed by [facility]    LUMBAR FUSION  2013    RHIZOTOMY W/ RADIOFREQUENCY ABLATION         Social History     Tobacco Use    Smoking status: Former Smoker     Packs/day: 0.25     Years: 50.00     Pack years: 12.50     Last attempt to quit: 2006     Years since quittin.6    Smokeless tobacco: Never Used   Substance Use Topics    Alcohol use: Yes     Comment: OCC    Drug use: No       Family History   Problem Relation Age of Onset    Hypertension Mother     Hypertension Father        Current Outpatient Medications   Medication Sig    aspirin (ECOTRIN) 81 MG EC tablet Take by mouth. 1 Tablet, Delayed Release (E.C.) Oral Every day    finasteride (PROSCAR) 5 mg tablet Take 5 mg by mouth once daily.     losartan  (COZAAR) 100 MG tablet Take 1 tablet (100 mg total) by mouth once daily.    lubiprostone (AMITIZA) 24 MCG Cap Take 1 capsule (24 mcg total) by mouth 2 (two) times daily with meals.    pramipexole (MIRAPEX) 0.25 MG tablet Take 1 tablet (0.25 mg total) by mouth 3 (three) times daily.    simvastatin (ZOCOR) 20 MG tablet TAKE 1/2 TABLET BY MOUTH DAILY    traZODone (DESYREL) 50 MG tablet Take 1 tablet (50 mg total) by mouth every evening.    zolpidem (AMBIEN) 5 MG Tab 5 mg nightly as needed.     cevimeline (EVOXAC) 30 mg capsule Take 1 capsule (30 mg total) by mouth 3 (three) times daily.    chlorthalidone (HYGROTEN) 25 MG Tab TAKE 1 TABLET ONE DAY AND 1/2 TABLET THE NEXT DAY AS DIRECTED    cyclobenzaprine (FLEXERIL) 10 MG tablet Take 1 tablet (10 mg total) by mouth 3 (three) times daily as needed for Muscle spasms.    dapsone 25 MG Tab 1 po bid    diazepam (VALIUM) 5 MG tablet TAKE 1 TABLET EVERY 12 HOURS AS NEEDED SPASMS    fish oil-omega-3 fatty acids 300-1,000 mg capsule Take 2 g by mouth once daily.    gabapentin (NEURONTIN) 600 MG tablet Take 1 tablet (600 mg total) by mouth 2 (two) times daily.    ibuprofen (ADVIL,MOTRIN) 800 MG tablet Take 800 mg by mouth every 8 (eight) hours.    methocarbamol (ROBAXIN) 750 MG Tab Take 500 mg by mouth 4 (four) times daily.    mycophenolate (CELLCEPT) 500 mg Tab 3 in the AM and 3 in the PM    oxycodone-acetaminophen (PERCOCET) 7.5-325 mg per tablet Take 1 tablet by mouth every 8 (eight) hours as needed.    ranitidine (ZANTAC) 300 MG tablet Take 1 tablet (300 mg total) by mouth every evening.    tafluprost, PF, (ZIOPTAN, PF,) 0.0015 % Dpet Place 1 drop into both eyes every evening.    tamsulosin (FLOMAX) 0.4 mg Cp24 Take 0.4 mg by mouth once daily.     VESICARE 10 mg tablet Take 10 mg by mouth once daily.     No current facility-administered medications for this visit.      Current Outpatient Medications on File Prior to Visit   Medication Sig    aspirin  (ECOTRIN) 81 MG EC tablet Take by mouth. 1 Tablet, Delayed Release (E.C.) Oral Every day    finasteride (PROSCAR) 5 mg tablet Take 5 mg by mouth once daily.     losartan (COZAAR) 100 MG tablet Take 1 tablet (100 mg total) by mouth once daily.    lubiprostone (AMITIZA) 24 MCG Cap Take 1 capsule (24 mcg total) by mouth 2 (two) times daily with meals.    pramipexole (MIRAPEX) 0.25 MG tablet Take 1 tablet (0.25 mg total) by mouth 3 (three) times daily.    simvastatin (ZOCOR) 20 MG tablet TAKE 1/2 TABLET BY MOUTH DAILY    traZODone (DESYREL) 50 MG tablet Take 1 tablet (50 mg total) by mouth every evening.    zolpidem (AMBIEN) 5 MG Tab 5 mg nightly as needed.     cevimeline (EVOXAC) 30 mg capsule Take 1 capsule (30 mg total) by mouth 3 (three) times daily.    chlorthalidone (HYGROTEN) 25 MG Tab TAKE 1 TABLET ONE DAY AND 1/2 TABLET THE NEXT DAY AS DIRECTED    cyclobenzaprine (FLEXERIL) 10 MG tablet Take 1 tablet (10 mg total) by mouth 3 (three) times daily as needed for Muscle spasms.    dapsone 25 MG Tab 1 po bid    diazepam (VALIUM) 5 MG tablet TAKE 1 TABLET EVERY 12 HOURS AS NEEDED SPASMS    fish oil-omega-3 fatty acids 300-1,000 mg capsule Take 2 g by mouth once daily.    gabapentin (NEURONTIN) 600 MG tablet Take 1 tablet (600 mg total) by mouth 2 (two) times daily.    ibuprofen (ADVIL,MOTRIN) 800 MG tablet Take 800 mg by mouth every 8 (eight) hours.    methocarbamol (ROBAXIN) 750 MG Tab Take 500 mg by mouth 4 (four) times daily.    mycophenolate (CELLCEPT) 500 mg Tab 3 in the AM and 3 in the PM    oxycodone-acetaminophen (PERCOCET) 7.5-325 mg per tablet Take 1 tablet by mouth every 8 (eight) hours as needed.    ranitidine (ZANTAC) 300 MG tablet Take 1 tablet (300 mg total) by mouth every evening.    tafluprost, PF, (ZIOPTAN, PF,) 0.0015 % Dpet Place 1 drop into both eyes every evening.    tamsulosin (FLOMAX) 0.4 mg Cp24 Take 0.4 mg by mouth once daily.     VESICARE 10 mg tablet Take 10 mg by  mouth once daily.     No current facility-administered medications on file prior to visit.            Review of patient's allergies indicates:   Allergen Reactions    Codeine Itching    Oxycodone Itching     Pt states it makes his nose itch and he does not like it       Review of Systems   Constitution: Negative for weakness and malaise/fatigue.   Eyes: Negative for blurred vision.   Cardiovascular: Negative for chest pain, claudication, cyanosis, dyspnea on exertion, irregular heartbeat, leg swelling, near-syncope, orthopnea, palpitations and paroxysmal nocturnal dyspnea.   Respiratory: Negative for cough, hemoptysis and shortness of breath.    Hematologic/Lymphatic: Negative for bleeding problem. Does not bruise/bleed easily.   Skin: Negative for dry skin and itching.   Musculoskeletal: Positive for arthritis, back pain and muscle cramps. Negative for falls, muscle weakness and myalgias.   Gastrointestinal: Negative for abdominal pain, diarrhea, heartburn, hematemesis, hematochezia and melena.   Genitourinary: Negative for flank pain and hematuria.   Neurological: Negative for dizziness, focal weakness, headaches, light-headedness, numbness, paresthesias and seizures.   Psychiatric/Behavioral: Negative for altered mental status and memory loss. The patient is not nervous/anxious.    Allergic/Immunologic: Negative for hives.       Objective:   Physical Exam   Constitutional: He is oriented to person, place, and time. He appears well-developed and well-nourished. No distress.   HENT:   Head: Normocephalic and atraumatic.   Eyes: EOM are normal. Pupils are equal, round, and reactive to light. Right eye exhibits no discharge. Left eye exhibits no discharge.   Neck: Neck supple. No JVD present. No thyromegaly present.   Scar l cea well healed.    Cardiovascular: Normal rate, regular rhythm, normal heart sounds and intact distal pulses. Exam reveals no gallop and no friction rub.   No murmur heard.  Pulses:        "Carotid pulses are 2+ on the right side, and 2+ on the left side.       Radial pulses are 2+ on the right side, and 2+ on the left side.        Femoral pulses are 2+ on the right side, and 2+ on the left side.       Popliteal pulses are 2+ on the right side, and 2+ on the left side.        Dorsalis pedis pulses are 2+ on the right side, and 2+ on the left side.        Posterior tibial pulses are 2+ on the right side, and 2+ on the left side.   Pulmonary/Chest: Effort normal and breath sounds normal. No respiratory distress. He has no wheezes. He has no rales. He exhibits no tenderness.   pacer site well healed.   Abdominal: Soft. Bowel sounds are normal. He exhibits no distension. There is no tenderness.   obese   Musculoskeletal: Normal range of motion. He exhibits edema (trace).   Neurological: He is alert and oriented to person, place, and time. No cranial nerve deficit.   Skin: Skin is warm and dry. No rash noted. He is not diaphoretic. No erythema.   Chronic venous stasis changes.   Psychiatric: He has a normal mood and affect. His behavior is normal.   Nursing note and vitals reviewed.    Vitals:    01/02/19 0848   BP: 138/66   Patient Position: Sitting   BP Method: Large (Manual)   Pulse: (!) 57   Weight: 91.5 kg (201 lb 11.5 oz)   Height: 5' 10" (1.778 m)     Lab Results   Component Value Date    CHOL 132 05/01/2015    CHOL 142 04/24/2014    CHOL 114 (L) 09/23/2013     Lab Results   Component Value Date    HDL 46 05/01/2015    HDL 42 04/24/2014    HDL 37 (L) 09/23/2013     Lab Results   Component Value Date    LDLCALC 76.2 05/01/2015    LDLCALC 85.6 04/24/2014    LDLCALC 63.8 09/23/2013     Lab Results   Component Value Date    TRIG 49 05/01/2015    TRIG 72 04/24/2014    TRIG 66 09/23/2013     Lab Results   Component Value Date    CHOLHDL 34.8 05/01/2015    CHOLHDL 29.6 04/24/2014    CHOLHDL 32.5 09/23/2013       Chemistry        Component Value Date/Time     12/10/2018 1353    K 3.9 12/10/2018 1353    "  12/10/2018 1353    CO2 30 (H) 12/10/2018 1353    BUN 22 12/10/2018 1353    CREATININE 0.9 12/10/2018 1353    GLU 99 12/10/2018 1353        Component Value Date/Time    CALCIUM 9.2 12/10/2018 1353    ALKPHOS 59 12/10/2018 1353    AST 24 12/10/2018 1353    ALT 25 12/10/2018 1353    BILITOT 0.4 12/10/2018 1353    ESTGFRAFRICA >60.0 12/10/2018 1353    EGFRNONAA >60.0 12/10/2018 1353          Lab Results   Component Value Date    TSH 0.598 12/10/2018     Lab Results   Component Value Date    INR 1.1 07/22/2009     Lab Results   Component Value Date    WBC 5.64 12/10/2018    HGB 13.6 (L) 12/10/2018    HCT 40.5 12/10/2018    MCV 95 12/10/2018     (L) 12/10/2018     BMP  Sodium   Date Value Ref Range Status   12/10/2018 138 136 - 145 mmol/L Final     Potassium   Date Value Ref Range Status   12/10/2018 3.9 3.5 - 5.1 mmol/L Final     Chloride   Date Value Ref Range Status   12/10/2018 102 95 - 110 mmol/L Final     CO2   Date Value Ref Range Status   12/10/2018 30 (H) 23 - 29 mmol/L Final     BUN, Bld   Date Value Ref Range Status   12/10/2018 22 8 - 23 mg/dL Final     Creatinine   Date Value Ref Range Status   12/10/2018 0.9 0.5 - 1.4 mg/dL Final     Calcium   Date Value Ref Range Status   12/10/2018 9.2 8.7 - 10.5 mg/dL Final     Anion Gap   Date Value Ref Range Status   12/10/2018 6 (L) 8 - 16 mmol/L Final     eGFR if    Date Value Ref Range Status   12/10/2018 >60.0 >60 mL/min/1.73 m^2 Final     eGFR if non    Date Value Ref Range Status   12/10/2018 >60.0 >60 mL/min/1.73 m^2 Final     Comment:     Calculation used to obtain the estimated glomerular filtration  rate (eGFR) is the CKD-EPI equation.        CrCl cannot be calculated (Patient's most recent lab result is older than the maximum 7 days allowed.).    Assessment:     1. Cardiac pacemaker    2. Hyperlipidemia, unspecified hyperlipidemia type    3. HTN (hypertension), benign    4. Benign prostatic hyperplasia,  unspecified whether lower urinary tract symptoms present    5. Abdominal aortic atherosclerosis - seen on AAA screening scan    6. Chronic back pain greater than 3 months duration    7. Gastroesophageal reflux disease without esophagitis    8. Bilateral carotid artery stenosis      Stable clinically his issues are musculoskeletal; very active despite back pain and  multiple surgeries  Needs lipids and repeat abdominal u/s.   Plan:   Need fasting lipids  Abdominal u/s top check abdominal aorta.  Continue current therapy  Cardiac low salt diet.  Risk factor modification and excercise program.  Smoking cessation counseling  F/u in 1 year earlier if any issues..

## 2019-01-14 ENCOUNTER — TELEPHONE (OUTPATIENT)
Dept: GASTROENTEROLOGY | Facility: CLINIC | Age: 79
End: 2019-01-14

## 2019-01-14 NOTE — TELEPHONE ENCOUNTER
----- Message from Leslie Moura sent at 1/14/2019 12:16 PM CST -----  Contact: self [phone]  Would like to consult with nurse, medication is not working.  Please call back at [phone].  Md Johana

## 2019-01-14 NOTE — TELEPHONE ENCOUNTER
Called patient and patient stated he was prescribed Amitiza and medication has not worked not once since taking it and patient would like something else prescribed.

## 2019-01-15 NOTE — TELEPHONE ENCOUNTER
"I have sent a prescription for Movantik. Will likely need PA. Can you ask Artur to do the "cover my meds" thing?  "

## 2019-01-16 ENCOUNTER — TELEPHONE (OUTPATIENT)
Dept: GASTROENTEROLOGY | Facility: CLINIC | Age: 79
End: 2019-01-16

## 2019-01-16 ENCOUNTER — TELEPHONE (OUTPATIENT)
Dept: NEPHROLOGY | Facility: CLINIC | Age: 79
End: 2019-01-16

## 2019-01-16 NOTE — TELEPHONE ENCOUNTER
----- Message from Patricia Rodriguez sent at 1/15/2019  9:31 AM CST -----  Contact: pt   States he's calling to come in to be seen by Dr Vega for inability to have a BM and has been seeing Ms Nielsen but is requesting to see Dr/ would be an NP to the Dr and can be reached at [phone]//thanks/dbw

## 2019-01-16 NOTE — TELEPHONE ENCOUNTER
Pt called and stated that he is still having severe constipation and the Amitiza has not worked. Pt was advised that a prescription for Movantik was sent his pharmacy. Pt will try the medication and let us know whether or not it works.

## 2019-01-16 NOTE — TELEPHONE ENCOUNTER
----- Message from Patricia Rodriguez sent at 1/15/2019  9:31 AM CST -----  Contact: pt   States he's calling to come in to be seen by Dr Vega for inability to have a BM and has been seeing Ms Nielsen but is requesting to see Dr/ would be an NP to the Dr and can be reached at [phone]//thanks/dbw

## 2019-01-18 ENCOUNTER — HOSPITAL ENCOUNTER (OUTPATIENT)
Dept: RADIOLOGY | Facility: HOSPITAL | Age: 79
Discharge: HOME | End: 2019-01-18
Attending: INTERNAL MEDICINE
Payer: MEDICARE

## 2019-01-18 DIAGNOSIS — I65.23 BILATERAL CAROTID ARTERY STENOSIS: ICD-10-CM

## 2019-01-18 DIAGNOSIS — E78.5 HYPERLIPIDEMIA, UNSPECIFIED HYPERLIPIDEMIA TYPE: Chronic | ICD-10-CM

## 2019-01-18 DIAGNOSIS — I70.0 ABDOMINAL AORTIC ATHEROSCLEROSIS: Chronic | ICD-10-CM

## 2019-01-18 PROCEDURE — 76775 US EXAM ABDO BACK WALL LIM: CPT | Mod: TC

## 2019-01-18 PROCEDURE — 76775 US EXAM ABDO BACK WALL LIM: CPT | Mod: 26,,, | Performed by: RADIOLOGY

## 2019-01-18 PROCEDURE — 76775 US ABDOMINAL AORTA: ICD-10-PCS | Mod: 26,,, | Performed by: RADIOLOGY

## 2019-01-21 ENCOUNTER — TELEPHONE (OUTPATIENT)
Dept: CARDIOLOGY | Facility: CLINIC | Age: 79
End: 2019-01-21

## 2019-01-21 ENCOUNTER — CLINICAL SUPPORT (OUTPATIENT)
Dept: CARDIOLOGY | Facility: CLINIC | Age: 79
End: 2019-01-21
Attending: INTERNAL MEDICINE
Payer: MEDICARE

## 2019-01-21 DIAGNOSIS — I49.5 SINUS NODE DYSFUNCTION: ICD-10-CM

## 2019-01-21 DIAGNOSIS — Z95.0 CARDIAC PACEMAKER IN SITU: ICD-10-CM

## 2019-01-21 DIAGNOSIS — I44.2 INTERMITTENT COMPLETE HEART BLOCK: ICD-10-CM

## 2019-01-21 PROCEDURE — 93294 PACEMAKER REMOTE: ICD-10-PCS | Mod: ,,, | Performed by: INTERNAL MEDICINE

## 2019-01-21 PROCEDURE — 93296 REM INTERROG EVL PM/IDS: CPT | Mod: PBBFAC,PN | Performed by: INTERNAL MEDICINE

## 2019-01-21 PROCEDURE — 93294 REM INTERROG EVL PM/LDLS PM: CPT | Mod: ,,, | Performed by: INTERNAL MEDICINE

## 2019-01-21 NOTE — TELEPHONE ENCOUNTER
----- Message from Tereso Ewing MD sent at 1/20/2019  9:06 PM CST -----  Needs better improvement in diet and exercise to get his lipids better like they were before

## 2019-01-28 ENCOUNTER — OFFICE VISIT (OUTPATIENT)
Dept: GASTROENTEROLOGY | Facility: CLINIC | Age: 79
End: 2019-01-28
Payer: MEDICARE

## 2019-01-28 VITALS
HEART RATE: 64 BPM | HEIGHT: 70 IN | BODY MASS INDEX: 28.81 KG/M2 | SYSTOLIC BLOOD PRESSURE: 120 MMHG | DIASTOLIC BLOOD PRESSURE: 58 MMHG | WEIGHT: 201.25 LBS

## 2019-01-28 DIAGNOSIS — K59.03 THERAPEUTIC OPIOID INDUCED CONSTIPATION: Primary | ICD-10-CM

## 2019-01-28 DIAGNOSIS — K58.1 IRRITABLE BOWEL SYNDROME WITH CONSTIPATION: ICD-10-CM

## 2019-01-28 DIAGNOSIS — T40.2X5A THERAPEUTIC OPIOID INDUCED CONSTIPATION: Primary | ICD-10-CM

## 2019-01-28 PROCEDURE — 99213 OFFICE O/P EST LOW 20 MIN: CPT | Mod: PBBFAC | Performed by: INTERNAL MEDICINE

## 2019-01-28 PROCEDURE — 99999 PR PBB SHADOW E&M-EST. PATIENT-LVL III: CPT | Mod: PBBFAC,,, | Performed by: INTERNAL MEDICINE

## 2019-01-28 PROCEDURE — 99999 PR PBB SHADOW E&M-EST. PATIENT-LVL III: ICD-10-PCS | Mod: PBBFAC,,, | Performed by: INTERNAL MEDICINE

## 2019-01-28 PROCEDURE — 99215 OFFICE O/P EST HI 40 MIN: CPT | Mod: S$PBB,,, | Performed by: INTERNAL MEDICINE

## 2019-01-28 PROCEDURE — 99215 PR OFFICE/OUTPT VISIT, EST, LEVL V, 40-54 MIN: ICD-10-PCS | Mod: S$PBB,,, | Performed by: INTERNAL MEDICINE

## 2019-01-28 RX ORDER — SYRING-NEEDL,DISP,INSUL,0.3 ML 29 G X1/2"
296 SYRINGE, EMPTY DISPOSABLE MISCELLANEOUS ONCE
Qty: 1 BOTTLE | Refills: 3 | Status: SHIPPED | OUTPATIENT
Start: 2019-01-28 | End: 2019-01-28

## 2019-01-28 NOTE — PATIENT INSTRUCTIONS
Therapeutic opioid induced constipation  -     magnesium citrate solution; Take 296 mLs by mouth once. for 1 dose  Dispense: 1 Bottle; Refill: 3    Irritable bowel syndrome with constipation      - Continue taking Amitiza as prescribed  - Drink one bottle of Mg Citrate every 3 to 5 days depending on the frequency of bowel movements.   - Drink at least one gallon of water every day.   A high fiber diet with plenty of fluids (up to 8 glasses of water daily) is suggested to relieve these symptoms.  Metamucil, 1 tablespoon once or twice daily can be used to keep bowels regular if needed.    Thanks for trusting us with your healthcare needs and using [facility]. If you want to ask us a question, you can do so by replying to this message or by calling [phone].    Sincerely,    Ok Vega M.D.      To rate your experience with Dr. Vega please click on the link below:    [email or web address]          Constipation (Adult)  Constipation means that you have bowel movements that are less frequent than usual. Stools often become very hard and difficult to pass.  Constipation is very common. At some point in life it affects almost everyone. Since everyone's bowel habits are different, what is constipation to one person may not be to another. Your healthcare provider may do tests to diagnose constipation. It depends on what he or she finds when evaluating you.    Symptoms of constipation include:  · Abdominal pain  · Bloating  · Vomiting  · Painful bowel movements  · Itching, swelling, bleeding, or pain around the anus  Causes  Constipation can have many causes. These include:  · Diet low in fiber  · Too much dairy  · Not drinking enough liquids  · Lack of exercise or physical activity. This is especially true for older adults.  · Changes in lifestyle or daily routine, including pregnancy, aging, work, and travel  · Frequent use or misuse of laxatives  · Ignoring the urge to  have a bowel movement or delaying it until later  · Medicines, such as certain prescription pain medicines, iron supplements, antacids, certain antidepressants, and calcium supplements  · Diseases like irritable bowel syndrome, bowel obstructions, stroke, diabetes, thyroid disease, Parkinson disease, hemorrhoids, and colon cancer  Complications  Potential complications of constipation can include:  · Hemorrhoids  · Rectal bleeding from hemorrhoids or anal fissures (skin tears)  · Hernias  · Dependency on laxatives  · Chronic constipation  · Fecal impaction  · Bowel obstruction or perforation  Home care  All treatment should be done after talking with your healthcare provider. This is especially true if you have another medical problems, are taking prescription medicines, or are an older adult. Treatment most often involves lifestyle changes. You may also need medicines. Your healthcare provider will tell you which will work best for you. Follow the advice below to help avoid this problem in the future.  Lifestyle changes  These lifestyle changes can help prevent constipation:  · Diet. Eat a high-fiber diet, with fresh fruit and vegetables, and reduce dairy intake, meats, and processed foods  · Fluids. It's important to get enough fluids each day. Drink plenty of water when you eat more fiber. If you are on diet that limits the amount of fluid you can have, talk about this with your healthcare provider.  · Regular exercise. Check with your healthcare provider first.  Medications  Take any medicines as directed. Some laxatives are safe to use only every now and then. Others can be taken on a regular basis. Talk with your doctor or pharmacist if you have questions.  Prescription pain medicines can cause constipation. If you are taking this kind of medicine, ask your healthcare provider if you should also take a stool softener.  Medicines you may take to treat constipation include:  · Fiber supplements  · Stool  softeners  · Laxatives  · Enemas  · Rectal suppositories  Follow-up care  Follow up with your healthcare provider if symptoms don't get better in the next few days. You may need to have more tests or see a specialist.  Call 911  Call 911 if any of these occur:  · Trouble breathing  · Stiff, rigid abdomen that is severely painful to touch  · Confusion  · Fainting or loss of consciousness  · Rapid heart rate  · Chest pain  When to seek medical advice  Call your healthcare provider right away if any of these occur:  · Fever over 100.4°F (38°C)  · Failure to resume normal bowel movements  · Pain in your abdomen or back gets worse  · Nausea or vomiting  · Swelling in your abdomen  · Blood in the stool  · Black, tarry stool  · Involuntary weight loss  · Weakness  Date Last Reviewed: 12/30/2015  © 5359-1824 [employer]. [address] [place] [address]. All rights reserved. This information is not intended as a substitute for professional medical care. Always follow your healthcare professional's instructions.        Diet and Lifestyle Tips for Irritable Bowel Syndrome (IBS)    Your healthcare professional may suggest some lifestyle changes to help control your IBS. Changing your diet and managing stress are two of the most important. Follow your healthcare providers instructions and try some of the suggestions below.  Change your diet  Your diet may be an important cause of IBS symptoms. You may want to try the following:  · Pay attention to what foods bother you, and avoid them. For example, dairy products are hard for some people to digest.  · Drink 6 to 8 glasses of water a day.  · Avoid caffeine and tobacco. These are muscle stimulants and can affect the working of your digestive tract.  · Avoid alcohol, which can irritate your digestive tract and make your symptoms worse.  · Eat more fiber if constipation is a problem. Fiber makes the stool softer and easier to pass through the colon.  Reduce  stress  If stress or anxiety makes your IBS symptoms worse, learning how to manage stress may help you feel better. Try these tips:  · Identify the causes of stress in your life.  · Learn new ways to cope with them.  · Regular exercise is a great way to relieve stress. It can also help ease constipation.  Date Last Reviewed: 7/1/2016  © 5983-3052 [employer]. [address] [place] [address], [place] [address]. All rights reserved. This information is not intended as a substitute for professional medical care. Always follow your healthcare professional's instructions.

## 2019-01-28 NOTE — PROGRESS NOTES
Subjective:       Patient ID: Aston Avendano is a 79 y.o. male.    Chief Complaint: Constipation    Patient was prescribed Movantik and Amitiza but states he is not seeing results.       Constipation   This is a chronic problem. The current episode started more than 1 year ago. The problem is unchanged. The stool is described as firm and pellet like. The patient is not on a high fiber diet. He does not exercise regularly. There has not been adequate water intake. Associated symptoms include back pain and bloating. Pertinent negatives include no abdominal pain, diarrhea, fecal incontinence, fever, hematochezia, melena, nausea, vomiting or weight loss. Risk factors include change in medication usage/dosage and immobility. He has tried laxatives, diet changes and stool softeners for the symptoms. The treatment provided moderate relief. His past medical history is significant for irritable bowel syndrome and neuromuscular disease.     Past Medical History:   Diagnosis Date    Arthritis     Cardiac syncope 2011    s/p pacemaker     Colon cancer screening 12/5/2016    Glaucoma     History of carotid artery stenosis 9/26/2013    No stenosis on scan done 11/2014. S/p CEA     Hypertension      Current Outpatient Medications on File Prior to Visit   Medication Sig Dispense Refill    aspirin (ECOTRIN) 81 MG EC tablet Take by mouth. 1 Tablet, Delayed Release (E.C.) Oral Every day      cevimeline (EVOXAC) 30 mg capsule Take 1 capsule (30 mg total) by mouth 3 (three) times daily. 90 capsule 3    chlorthalidone (HYGROTEN) 25 MG Tab TAKE 1 TABLET ONE DAY AND 1/2 TABLET THE NEXT DAY AS DIRECTED 30 tablet 11    cyclobenzaprine (FLEXERIL) 10 MG tablet Take 1 tablet (10 mg total) by mouth 3 (three) times daily as needed for Muscle spasms. 50 tablet 2    dapsone 25 MG Tab 1 po bid      diazepam (VALIUM) 5 MG tablet TAKE 1 TABLET EVERY 12 HOURS AS NEEDED SPASMS  0    finasteride (PROSCAR) 5 mg tablet Take 5 mg by mouth once  daily.       fish oil-omega-3 fatty acids 300-1,000 mg capsule Take 2 g by mouth once daily.      ibuprofen (ADVIL,MOTRIN) 800 MG tablet Take 800 mg by mouth every 8 (eight) hours.  1    losartan (COZAAR) 100 MG tablet Take 1 tablet (100 mg total) by mouth once daily. 90 tablet 3    lubiprostone (AMITIZA) 24 MCG Cap Take 1 capsule (24 mcg total) by mouth 2 (two) times daily with meals. 60 capsule 3    methocarbamol (ROBAXIN) 750 MG Tab Take 500 mg by mouth 4 (four) times daily.      mycophenolate (CELLCEPT) 500 mg Tab 3 in the AM and 3 in the PM      naloxegol (MOVANTIK) tablet Take 25 mg by mouth once daily. 30 tablet 2    oxycodone-acetaminophen (PERCOCET) 7.5-325 mg per tablet Take 1 tablet by mouth every 8 (eight) hours as needed.  0    pramipexole (MIRAPEX) 0.25 MG tablet Take 1 tablet (0.25 mg total) by mouth 3 (three) times daily. 90 tablet 1    simvastatin (ZOCOR) 20 MG tablet TAKE 1/2 TABLET BY MOUTH DAILY 45 tablet 1    tafluprost, PF, (ZIOPTAN, PF,) 0.0015 % Dpet Place 1 drop into both eyes every evening. 30 each 6    tamsulosin (FLOMAX) 0.4 mg Cp24 Take 0.4 mg by mouth once daily.       traZODone (DESYREL) 50 MG tablet Take 1 tablet (50 mg total) by mouth every evening. 30 tablet 1    VESICARE 10 mg tablet Take 10 mg by mouth once daily.  0    zolpidem (AMBIEN) 5 MG Tab 5 mg nightly as needed.   0    gabapentin (NEURONTIN) 600 MG tablet Take 1 tablet (600 mg total) by mouth 2 (two) times daily. 180 tablet 3    ranitidine (ZANTAC) 300 MG tablet Take 1 tablet (300 mg total) by mouth every evening. 90 tablet 3     No current facility-administered medications on file prior to visit.      Review of patient's allergies indicates:   Allergen Reactions    Codeine Itching    Oxycodone Itching     Pt states it makes his nose itch and he does not like it     Social History     Socioeconomic History    Marital status:      Spouse name: Not on file    Number of children: Not on file     Years of education: Not on file    Highest education level: Not on file   Social Needs    Financial resource strain: Not on file    Food insecurity - worry: Not on file    Food insecurity - inability: Not on file    Transportation needs - medical: Not on file    Transportation needs - non-medical: Not on file   Occupational History    Not on file   Tobacco Use    Smoking status: Former Smoker     Packs/day: 0.25     Years: 50.00     Pack years: 12.50     Last attempt to quit: 2006     Years since quittin.7    Smokeless tobacco: Never Used   Substance and Sexual Activity    Alcohol use: Yes     Comment: OCC    Drug use: No    Sexual activity: Not on file   Other Topics Concern    Not on file   Social History Narrative    Not on file     Family History   Problem Relation Age of Onset    Hypertension Mother     Hypertension Father        Review of Systems   Constitutional: Negative for activity change, appetite change, fatigue, fever, unexpected weight change and weight loss.   HENT: Negative for congestion, ear pain, hearing loss, mouth sores, trouble swallowing and voice change.    Eyes: Negative for pain, redness and itching.   Respiratory: Negative for apnea, cough, choking, chest tightness, shortness of breath and wheezing.    Cardiovascular: Negative for chest pain, palpitations and leg swelling.   Gastrointestinal: Positive for bloating and constipation. Negative for abdominal distention, abdominal pain, anal bleeding, blood in stool, diarrhea, hematochezia, melena, nausea and vomiting.   Endocrine: Negative for cold intolerance, heat intolerance and polyphagia.   Genitourinary: Negative for dysuria, hematuria and urgency.   Musculoskeletal: Positive for arthralgias and back pain. Negative for joint swelling and neck pain.   Skin: Negative for pallor and rash.   Allergic/Immunologic: Negative for environmental allergies and food allergies.   Neurological: Negative for dizziness, facial  asymmetry and light-headedness.   Hematological: Negative for adenopathy. Does not bruise/bleed easily.   Psychiatric/Behavioral: Negative for agitation, behavioral problems, confusion and decreased concentration.       Objective:      Physical Exam   Constitutional: He is oriented to person, place, and time. He appears well-developed and well-nourished.   HENT:   Head: Normocephalic and atraumatic.   Eyes: Conjunctivae are normal. Pupils are equal, round, and reactive to light.   Neck: Normal range of motion. Neck supple. No tracheal deviation present. No thyromegaly present.   Cardiovascular: Normal rate and regular rhythm.   Pulmonary/Chest: Effort normal and breath sounds normal. He has no wheezes. He has no rales. He exhibits no tenderness.   Abdominal: Soft. Bowel sounds are normal. He exhibits no distension and no mass. There is no tenderness. There is no guarding.   Musculoskeletal: Normal range of motion.   Lymphadenopathy:     He has no cervical adenopathy.   Neurological: He is alert and oriented to person, place, and time. No cranial nerve deficit.   Skin: Skin is warm and dry.   Psychiatric: He has a normal mood and affect. His behavior is normal.   Nursing note and vitals reviewed.      Assessment:       1. Therapeutic opioid induced constipation    2. Irritable bowel syndrome with constipation        Plan:       Therapeutic opioid induced constipation  -     magnesium citrate solution; Take 296 mLs by mouth once. for 1 dose  Dispense: 1 Bottle; Refill: 3    Irritable bowel syndrome with constipation      - Continue taking Amitiza as prescribed  - Drink one bottle of Mg Citrate every 3 to 5 days depending on the frequency of bowel movements.   - Drink at least one gallon of water every day.   A high fiber diet with plenty of fluids (up to 8 glasses of water daily) is suggested to relieve these symptoms.  Metamucil, 1 tablespoon once or twice daily can be used to keep bowels regular if needed.    Thanks  for trusting us with your healthcare needs.    Sincerely,    Ok Vega M.D.

## 2019-02-08 ENCOUNTER — OFFICE VISIT (OUTPATIENT)
Dept: PODIATRY | Facility: CLINIC | Age: 79
End: 2019-02-08
Payer: MEDICARE

## 2019-02-08 VITALS
SYSTOLIC BLOOD PRESSURE: 125 MMHG | WEIGHT: 204.38 LBS | BODY MASS INDEX: 29.26 KG/M2 | HEART RATE: 59 BPM | HEIGHT: 70 IN | DIASTOLIC BLOOD PRESSURE: 71 MMHG

## 2019-02-08 DIAGNOSIS — S99.921A INJURY OF TOENAIL OF RIGHT FOOT, INITIAL ENCOUNTER: ICD-10-CM

## 2019-02-08 DIAGNOSIS — M79.674 PAIN DUE TO ONYCHOMYCOSIS OF TOENAILS OF BOTH FEET: Primary | ICD-10-CM

## 2019-02-08 DIAGNOSIS — B35.1 PAIN DUE TO ONYCHOMYCOSIS OF TOENAILS OF BOTH FEET: Primary | ICD-10-CM

## 2019-02-08 DIAGNOSIS — M79.675 PAIN DUE TO ONYCHOMYCOSIS OF TOENAILS OF BOTH FEET: Primary | ICD-10-CM

## 2019-02-08 PROCEDURE — 99999 PR PBB SHADOW E&M-EST. PATIENT-LVL III: CPT | Mod: PBBFAC,,, | Performed by: PODIATRIST

## 2019-02-08 PROCEDURE — 99213 OFFICE O/P EST LOW 20 MIN: CPT | Mod: PBBFAC | Performed by: PODIATRIST

## 2019-02-08 PROCEDURE — 99203 OFFICE O/P NEW LOW 30 MIN: CPT | Mod: S$PBB,,, | Performed by: PODIATRIST

## 2019-02-08 PROCEDURE — 99203 PR OFFICE/OUTPT VISIT, NEW, LEVL III, 30-44 MIN: ICD-10-PCS | Mod: S$PBB,,, | Performed by: PODIATRIST

## 2019-02-08 PROCEDURE — 99999 PR PBB SHADOW E&M-EST. PATIENT-LVL III: ICD-10-PCS | Mod: PBBFAC,,, | Performed by: PODIATRIST

## 2019-02-08 NOTE — PROGRESS NOTES
[facility]  PODIATRIC MEDICINE AND SURGERY      CHIEF COMPLAINT   Chief Complaint   Patient presents with    Foot Pain     right great toenail pain rated at 3/10         HPI:    Aston Avendano is a 79 y.o. male presenting to podiatry clinic with complaint of fungal infection on toenails. Right great toenail specifically painful and lifting from nail plate. Denies any trauma to site. He has chronic lower back pain and difficulty in trimming. No further pedal complaints.      PMH  Past Medical History:   Diagnosis Date    Arthritis     Cardiac syncope 2011    s/p pacemaker     Colon cancer screening 12/5/2016    Glaucoma     History of carotid artery stenosis 9/26/2013    No stenosis on scan done 11/2014. S/p CEA     Hypertension        PROBLEM LIST  Patient Active Problem List    Diagnosis Date Noted    Colon cancer screening 12/05/2016    Closed fracture of right thumb 08/22/2016    Disturbance of skin sensation 08/22/2016    Bilateral carotid artery stenosis 05/26/2016    Glaucoma (increased eye pressure) 11/02/2015    Insufficiency of tear film of both eyes 11/02/2015    Trichiasis of right eyelid 11/02/2015    Abdominal aortic atherosclerosis - seen on AAA screening scan 01/05/2015    GERD (gastroesophageal reflux disease) 10/28/2014    Chronic back pain greater than 3 months duration 10/28/2014    BPH (benign prostatic hyperplasia)     HTN (hypertension), benign 09/26/2013    Hyperlipidemia     Cardiac pacemaker     Open-angle glaucoma, mild stage 07/17/2013    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 1 Tablet, Delayed Release (E.C.) Oral Every day      cevimeline (EVOXAC) 30 mg capsule Take 1 capsule (30 mg total) by mouth 3 (three) times daily. 90 capsule 3    chlorthalidone (HYGROTEN) 25 MG Tab  TAKE 1 TABLET ONE DAY AND 1/2 TABLET THE NEXT DAY AS DIRECTED 30 tablet 11    cyclobenzaprine (FLEXERIL) 10 MG tablet Take 1 tablet (10 mg total) by mouth 3 (three) times daily as needed for Muscle spasms. 50 tablet 2    dapsone 25 MG Tab 1 po bid      diazepam (VALIUM) 5 MG tablet TAKE 1 TABLET EVERY 12 HOURS AS NEEDED SPASMS  0    finasteride (PROSCAR) 5 mg tablet Take 5 mg by mouth once daily.       fish oil-omega-3 fatty acids 300-1,000 mg capsule Take 2 g by mouth once daily.      ibuprofen (ADVIL,MOTRIN) 800 MG tablet Take 800 mg by mouth every 8 (eight) hours.  1    losartan (COZAAR) 100 MG tablet Take 1 tablet (100 mg total) by mouth once daily. 90 tablet 3    lubiprostone (AMITIZA) 24 MCG Cap Take 1 capsule (24 mcg total) by mouth 2 (two) times daily with meals. 60 capsule 3    methocarbamol (ROBAXIN) 750 MG Tab Take 500 mg by mouth 4 (four) times daily.      mycophenolate (CELLCEPT) 500 mg Tab 3 in the AM and 3 in the PM      naloxegol (MOVANTIK) tablet Take 25 mg by mouth once daily. 30 tablet 2    oxycodone-acetaminophen (PERCOCET) 7.5-325 mg per tablet Take 1 tablet by mouth every 8 (eight) hours as needed.  0    pramipexole (MIRAPEX) 0.25 MG tablet Take 1 tablet (0.25 mg total) by mouth 3 (three) times daily. 90 tablet 1    simvastatin (ZOCOR) 20 MG tablet TAKE 1/2 TABLET BY MOUTH DAILY 45 tablet 1    tafluprost, PF, (ZIOPTAN, PF,) 0.0015 % Dpet Place 1 drop into both eyes every evening. 30 each 6    tamsulosin (FLOMAX) 0.4 mg Cp24 Take 0.4 mg by mouth once daily.       traZODone (DESYREL) 50 MG tablet Take 1 tablet (50 mg total) by mouth every evening. 30 tablet 1    VESICARE 10 mg tablet Take 10 mg by mouth once daily.  0    zolpidem (AMBIEN) 5 MG Tab 5 mg nightly as needed.   0    gabapentin (NEURONTIN) 600 MG tablet Take 1 tablet (600 mg total) by mouth 2 (two) times daily. 180 tablet 3    ranitidine (ZANTAC) 300 MG tablet Take 1 tablet (300 mg total) by mouth every evening. 90  "tablet 3     No current facility-administered medications on file prior to visit.        PSH     Past Surgical History:   Procedure Laterality Date    CARDIAC PACEMAKER PLACEMENT      CARDIAC PACEMAKER PLACEMENT      CAROTID ENDARTERECTOMY      CATARACT EXTRACTION W/  INTRAOCULAR LENS IMPLANT  OD 12    CATARACT EXTRACTION W/  INTRAOCULAR LENS IMPLANT  OS date unknown    COLONOSCOPY N/A 2016    [facility]    LUMBAR FUSION  2013    RHIZOTOMY W/ RADIOFREQUENCY ABLATION  2010        ALL  Review of patient's allergies indicates:   Allergen Reactions    Codeine Itching    Oxycodone Itching     Pt states it makes his nose itch and he does not like it       SOC     Social History     Tobacco Use    Smoking status: Former Smoker     Packs/day: 0.25     Years: 50.00     Pack years: 12.50     Last attempt to quit: 2006     Years since quittin.7    Smokeless tobacco: Never Used   Substance Use Topics    Alcohol use: Yes     Comment: OCC    Drug use: No         Family HX    Family History   Problem Relation Age of Onset    Hypertension Mother     Hypertension Father             REVIEW OF SYSTEMS  General: Denies any fever or chills  Chest: Denies shortness of breath, wheezing, coughing, or sputum production  Heart: Denies chest pain, cold extremities, orthopenia, or reduced exercise tolerance  As noted above and per history of current illness above, otherwise negative in the remainder of the 14 systems.      PHYSICAL EXAM:      Vitals:    19 0752   BP: 125/71   Pulse: (!) 59   Weight: 92.7 kg (204 lb 5.9 oz)   Height: 5' 10" (1.778 m)   PainSc:   3       General: This patient is well-developed, well-nourished and appears stated age, well-oriented to person, place and time, and cooperative and pleasant on today's visit    LOWER EXTREMITY PHYSICAL EXAM  VASCULAR  Dorsalis pedis and posterior tibial pulses palpable 1/4 bilaterally.   Capillary refill time " immediate to the toes.   Feet are warm to the touch. Skin temperature warm to warm from proximally to distally   There are varicosities, telangiectasias noted to bilateral foot and ankle regions.   There are no ecchymoses noted to bilateral foot and ankle regions.   There is gross lower extremity edema.    DERMATOLOGIC  There are thickened discolored, elongated mycotic nails suggestive of onychomycosis    Right hallux toenail with lifting at distal 1/4 of plate   Skin moist with healthy texture and turgor.  There are no open ulcerations, lacerations, or fissures to bilateral foot and ankle regions. There are no signs of infection as there is no erythema, no proximal-extending lymphangiitis, no fluctuance, or crepitus noted on palpation to bilateral foot and ankle regions.   There is no interdigital maceration.   There are hyperkeratotic lesions noted to feet.     NEUROLOGIC  Epicritic sensation is intact as the patient is able to sense light touch to bilateral foot and ankle regions.   Achilles and patellar deep tendon reflexes intact  Babinski reflex absent    ORTHOPEDIC/BIOMECHANICAL  No symptomatic structural abnormalities noted  Muscle strength AT/EHL/EDL/PT: 5/5; Achilles/Gastroc/Soleus: 5/5; PB/PL: 5/5 Muscle tone is normal.  Ankle joint ROM INTACT DF/PF, non-crepitus      ASSESSMENT   Pain due to onychomycosis of toenails of both feet    Injury of toenail of right foot, initial encounter        PLAN    1. Patient was educated about clinical and imaging findings, and verbalizes understanding of above.  2. I Discussed treatment options for nail fungus.   -I explained that fungus lives in a warm dark moist environment and therefore patient should make every attempt to keep feet clean and dry.  We discussed drying feet thoroughly after shower particularly between the toes and then applying powder between the toes and in the shoes. I also discussed to disinfect shower tub, discard old shoes, and disinfect current  shoes with antiseptic  Recommend vicks vapor rub    -With patient's permission, the elongated onychomycotic toenails, as outlined in the physical examination, were sharply debrided/trimmed with a double action nail nipper to their soft tissue attachment. If indicated, the nails were then smoothed down in thickness with a mechanical rotary chely and/or omaira board to facilitate in further debridement removing all offending nail and subungual debris    Discussed PROC B     Report Electronically Signed By:     Christie Carbajal DPM   Podiatry  [facility]  2/8/2019

## 2019-02-18 ENCOUNTER — OFFICE VISIT (OUTPATIENT)
Dept: FAMILY MEDICINE | Facility: CLINIC | Age: 79
End: 2019-02-18
Payer: MEDICARE

## 2019-02-18 VITALS
SYSTOLIC BLOOD PRESSURE: 130 MMHG | WEIGHT: 201.06 LBS | BODY MASS INDEX: 28.78 KG/M2 | HEART RATE: 82 BPM | TEMPERATURE: 97 F | RESPIRATION RATE: 16 BRPM | OXYGEN SATURATION: 96 % | DIASTOLIC BLOOD PRESSURE: 68 MMHG | HEIGHT: 70 IN

## 2019-02-18 DIAGNOSIS — I10 HTN (HYPERTENSION), BENIGN: ICD-10-CM

## 2019-02-18 DIAGNOSIS — I48.91 ATRIAL FIBRILLATION, UNSPECIFIED TYPE: ICD-10-CM

## 2019-02-18 DIAGNOSIS — R60.0 PERIPHERAL EDEMA: Primary | ICD-10-CM

## 2019-02-18 PROCEDURE — 99214 OFFICE O/P EST MOD 30 MIN: CPT | Mod: S$PBB,,, | Performed by: FAMILY MEDICINE

## 2019-02-18 PROCEDURE — 99213 OFFICE O/P EST LOW 20 MIN: CPT | Mod: PBBFAC,PO | Performed by: FAMILY MEDICINE

## 2019-02-18 PROCEDURE — 99214 PR OFFICE/OUTPT VISIT, EST, LEVL IV, 30-39 MIN: ICD-10-PCS | Mod: S$PBB,,, | Performed by: FAMILY MEDICINE

## 2019-02-18 PROCEDURE — 99999 PR PBB SHADOW E&M-EST. PATIENT-LVL III: ICD-10-PCS | Mod: PBBFAC,,, | Performed by: FAMILY MEDICINE

## 2019-02-18 PROCEDURE — 99999 PR PBB SHADOW E&M-EST. PATIENT-LVL III: CPT | Mod: PBBFAC,,, | Performed by: FAMILY MEDICINE

## 2019-02-18 RX ORDER — FUROSEMIDE 20 MG/1
20 TABLET ORAL DAILY
Qty: 30 TABLET | Refills: 1 | Status: SHIPPED | OUTPATIENT
Start: 2019-02-18 | End: 2019-03-25

## 2019-02-18 NOTE — PROGRESS NOTES
Subjective:       Patient ID: Aston Avendnao is a 79 y.o. male.    Chief Complaint: Leg Swelling (left)      HPI Comments:       Current Outpatient Medications:     aspirin (ECOTRIN) 81 MG EC tablet, Take by mouth. 1 Tablet, Delayed Release (E.C.) Oral Every day, Disp: , Rfl:     cevimeline (EVOXAC) 30 mg capsule, Take 1 capsule (30 mg total) by mouth 3 (three) times daily., Disp: 90 capsule, Rfl: 3    chlorthalidone (HYGROTEN) 25 MG Tab, TAKE 1 TABLET ONE DAY AND 1/2 TABLET THE NEXT DAY AS DIRECTED, Disp: 30 tablet, Rfl: 11    cyclobenzaprine (FLEXERIL) 10 MG tablet, Take 1 tablet (10 mg total) by mouth 3 (three) times daily as needed for Muscle spasms., Disp: 50 tablet, Rfl: 2    dapsone 25 MG Tab, 1 po bid, Disp: , Rfl:     diazepam (VALIUM) 5 MG tablet, TAKE 1 TABLET EVERY 12 HOURS AS NEEDED SPASMS, Disp: , Rfl: 0    finasteride (PROSCAR) 5 mg tablet, Take 5 mg by mouth once daily. , Disp: , Rfl:     fish oil-omega-3 fatty acids 300-1,000 mg capsule, Take 2 g by mouth once daily., Disp: , Rfl:     ibuprofen (ADVIL,MOTRIN) 800 MG tablet, Take 800 mg by mouth every 8 (eight) hours., Disp: , Rfl: 1    losartan (COZAAR) 100 MG tablet, Take 1 tablet (100 mg total) by mouth once daily., Disp: 90 tablet, Rfl: 3    lubiprostone (AMITIZA) 24 MCG Cap, Take 1 capsule (24 mcg total) by mouth 2 (two) times daily with meals., Disp: 60 capsule, Rfl: 3    methocarbamol (ROBAXIN) 750 MG Tab, Take 500 mg by mouth 4 (four) times daily., Disp: , Rfl:     mycophenolate (CELLCEPT) 500 mg Tab, 3 in the AM and 3 in the PM, Disp: , Rfl:     naloxegol (MOVANTIK) tablet, Take 25 mg by mouth once daily., Disp: 30 tablet, Rfl: 2    oxycodone-acetaminophen (PERCOCET) 7.5-325 mg per tablet, Take 1 tablet by mouth every 8 (eight) hours as needed., Disp: , Rfl: 0    pramipexole (MIRAPEX) 0.25 MG tablet, Take 1 tablet (0.25 mg total) by mouth 3 (three) times daily., Disp: 90 tablet, Rfl: 1    simvastatin (ZOCOR) 20 MG tablet,  "TAKE 1/2 TABLET BY MOUTH DAILY, Disp: 45 tablet, Rfl: 1    tafluprost, PF, (ZIOPTAN, PF,) 0.0015 % Dpet, Place 1 drop into both eyes every evening., Disp: 30 each, Rfl: 6    tamsulosin (FLOMAX) 0.4 mg Cp24, Take 0.4 mg by mouth once daily. , Disp: , Rfl:     traZODone (DESYREL) 50 MG tablet, Take 1 tablet (50 mg total) by mouth every evening., Disp: 30 tablet, Rfl: 1    VESICARE 10 mg tablet, Take 10 mg by mouth once daily., Disp: , Rfl: 0    zolpidem (AMBIEN) 5 MG Tab, 5 mg nightly as needed. , Disp: , Rfl: 0    furosemide (LASIX) 20 MG tablet, Take 1 tablet (20 mg total) by mouth once daily., Disp: 30 tablet, Rfl: 1    gabapentin (NEURONTIN) 600 MG tablet, Take 1 tablet (600 mg total) by mouth 2 (two) times daily., Disp: 180 tablet, Rfl: 3    ranitidine (ZANTAC) 300 MG tablet, Take 1 tablet (300 mg total) by mouth every evening., Disp: 90 tablet, Rfl: 3      Complains of some increased swelling in his left calf and foot for the last week or so.  Has not noticed on his right.  Does not recall any previous history of swelling. No calf tenderness. No shortness of breath or chest pain. No new medications.  As far as he knows he has been taking his chlorthalidone daily      Review of Systems   Constitutional: Negative for activity change, appetite change and fever.   HENT: Negative for sore throat.    Respiratory: Negative for cough and shortness of breath.    Cardiovascular: Positive for leg swelling. Negative for chest pain.   Gastrointestinal: Negative for abdominal pain, diarrhea and nausea.   Genitourinary: Negative for difficulty urinating.   Musculoskeletal: Negative for arthralgias and myalgias.   Neurological: Negative for dizziness and headaches.       Objective:      Vitals:    02/18/19 1346   BP: 130/68   Pulse: 82   Resp: 16   Temp: 97 °F (36.1 °C)   TempSrc: Temporal   SpO2: 96%   Weight: 91.2 kg (201 lb 1 oz)   Height: 5' 10" (1.778 m)   PainSc: 0-No pain     Physical Exam   Constitutional: He is " oriented to person, place, and time. He appears well-developed and well-nourished. No distress.   No weight gain   HENT:   Head: Normocephalic.   Mouth/Throat: No oropharyngeal exudate.   Neck: Neck supple. No thyromegaly present.   Cardiovascular: Normal rate, regular rhythm and normal heart sounds.   No murmur heard.  Pulmonary/Chest: Effort normal and breath sounds normal. He has no wheezes. He has no rales.   Abdominal: Soft. He exhibits no distension.   Musculoskeletal: He exhibits no edema.   1+ pitting edema left pretibial and pedal areas.  Trace pitting edema on right lower extremity   Lymphadenopathy:     He has no cervical adenopathy.   Neurological: He is alert and oriented to person, place, and time.   Skin: Skin is warm and dry. He is not diaphoretic.   Psychiatric: He has a normal mood and affect. His behavior is normal. Judgment and thought content normal.   Nursing note and vitals reviewed.      Assessment:       1. Peripheral edema    2. HTN (hypertension), benign    3. Atrial fibrillation, unspecified type        Plan:   Peripheral edema  Comments:  Low-dose Lasix daily as needed.  Follow up with Cardiology next 4-6 weeks    HTN (hypertension), benign  Comments:  Controlled    Atrial fibrillation, unspecified type  Comments:  Control.  Asymptomatic    Other orders  -     furosemide (LASIX) 20 MG tablet; Take 1 tablet (20 mg total) by mouth once daily.  Dispense: 30 tablet; Refill: 1

## 2019-03-11 DIAGNOSIS — I10 ESSENTIAL HYPERTENSION: ICD-10-CM

## 2019-03-11 RX ORDER — LOSARTAN POTASSIUM 100 MG/1
100 TABLET ORAL DAILY
Qty: 90 TABLET | Refills: 3 | Status: SHIPPED | OUTPATIENT
Start: 2019-03-11 | End: 2020-08-19 | Stop reason: SDUPTHER

## 2019-03-25 ENCOUNTER — OFFICE VISIT (OUTPATIENT)
Dept: FAMILY MEDICINE | Facility: CLINIC | Age: 79
End: 2019-03-25
Payer: MEDICARE

## 2019-03-25 ENCOUNTER — LAB VISIT (OUTPATIENT)
Dept: LAB | Facility: HOSPITAL | Age: 79
End: 2019-03-25
Attending: FAMILY MEDICINE
Payer: MEDICARE

## 2019-03-25 VITALS
WEIGHT: 197.75 LBS | TEMPERATURE: 98 F | DIASTOLIC BLOOD PRESSURE: 63 MMHG | HEIGHT: 70 IN | BODY MASS INDEX: 28.31 KG/M2 | HEART RATE: 58 BPM | SYSTOLIC BLOOD PRESSURE: 136 MMHG | OXYGEN SATURATION: 99 %

## 2019-03-25 DIAGNOSIS — I10 HTN (HYPERTENSION), BENIGN: ICD-10-CM

## 2019-03-25 DIAGNOSIS — D64.9 ANEMIA, UNSPECIFIED TYPE: ICD-10-CM

## 2019-03-25 DIAGNOSIS — G25.81 RESTLESS LEG SYNDROME: Primary | ICD-10-CM

## 2019-03-25 DIAGNOSIS — I48.91 ATRIAL FIBRILLATION, UNSPECIFIED TYPE: ICD-10-CM

## 2019-03-25 DIAGNOSIS — R60.0 PERIPHERAL EDEMA: ICD-10-CM

## 2019-03-25 LAB
BASOPHILS # BLD AUTO: 0.02 K/UL (ref 0–0.2)
BASOPHILS NFR BLD: 0.4 % (ref 0–1.9)
DIFFERENTIAL METHOD: ABNORMAL
EOSINOPHIL # BLD AUTO: 0.2 K/UL (ref 0–0.5)
EOSINOPHIL NFR BLD: 3.5 % (ref 0–8)
ERYTHROCYTE [DISTWIDTH] IN BLOOD BY AUTOMATED COUNT: 13 % (ref 11.5–14.5)
HCT VFR BLD AUTO: 39.6 % (ref 40–54)
HGB BLD-MCNC: 12.9 G/DL (ref 14–18)
IMM GRANULOCYTES # BLD AUTO: 0.02 K/UL (ref 0–0.04)
IMM GRANULOCYTES NFR BLD AUTO: 0.4 % (ref 0–0.5)
LYMPHOCYTES # BLD AUTO: 1.1 K/UL (ref 1–4.8)
LYMPHOCYTES NFR BLD: 22.3 % (ref 18–48)
MCH RBC QN AUTO: 30 PG (ref 27–31)
MCHC RBC AUTO-ENTMCNC: 32.6 G/DL (ref 32–36)
MCV RBC AUTO: 92 FL (ref 82–98)
MONOCYTES # BLD AUTO: 0.6 K/UL (ref 0.3–1)
MONOCYTES NFR BLD: 11.5 % (ref 4–15)
NEUTROPHILS # BLD AUTO: 3 K/UL (ref 1.8–7.7)
NEUTROPHILS NFR BLD: 61.9 % (ref 38–73)
NRBC BLD-RTO: 0 /100 WBC
PLATELET # BLD AUTO: 150 K/UL (ref 150–350)
PMV BLD AUTO: 12 FL (ref 9.2–12.9)
RBC # BLD AUTO: 4.3 M/UL (ref 4.6–6.2)
WBC # BLD AUTO: 4.8 K/UL (ref 3.9–12.7)

## 2019-03-25 PROCEDURE — 99999 PR PBB SHADOW E&M-EST. PATIENT-LVL III: CPT | Mod: PBBFAC,,, | Performed by: FAMILY MEDICINE

## 2019-03-25 PROCEDURE — 99214 OFFICE O/P EST MOD 30 MIN: CPT | Mod: S$PBB,,, | Performed by: FAMILY MEDICINE

## 2019-03-25 PROCEDURE — 36415 COLL VENOUS BLD VENIPUNCTURE: CPT | Mod: PO

## 2019-03-25 PROCEDURE — 99999 PR PBB SHADOW E&M-EST. PATIENT-LVL III: ICD-10-PCS | Mod: PBBFAC,,, | Performed by: FAMILY MEDICINE

## 2019-03-25 PROCEDURE — 99213 OFFICE O/P EST LOW 20 MIN: CPT | Mod: PBBFAC,PO | Performed by: FAMILY MEDICINE

## 2019-03-25 PROCEDURE — 99214 PR OFFICE/OUTPT VISIT, EST, LEVL IV, 30-39 MIN: ICD-10-PCS | Mod: S$PBB,,, | Performed by: FAMILY MEDICINE

## 2019-03-25 PROCEDURE — 85025 COMPLETE CBC W/AUTO DIFF WBC: CPT

## 2019-03-25 RX ORDER — GABAPENTIN 600 MG/1
600 TABLET ORAL 2 TIMES DAILY
COMMUNITY
End: 2019-08-12 | Stop reason: SDUPTHER

## 2019-03-25 NOTE — PROGRESS NOTES
Subjective:       Patient ID: Aston Avendano is a 79 y.o. male.    Chief Complaint: Follow-up      HPI Comments:       Current Outpatient Medications:     aspirin (ECOTRIN) 81 MG EC tablet, Take by mouth. 1 Tablet, Delayed Release (E.C.) Oral Every day, Disp: , Rfl:     chlorthalidone (HYGROTEN) 25 MG Tab, TAKE 1 TABLET ONE DAY AND 1/2 TABLET THE NEXT DAY AS DIRECTED, Disp: 30 tablet, Rfl: 11    finasteride (PROSCAR) 5 mg tablet, Take 5 mg by mouth once daily. , Disp: , Rfl:     fish oil-omega-3 fatty acids 300-1,000 mg capsule, Take 2 g by mouth once daily., Disp: , Rfl:     gabapentin (NEURONTIN) 600 MG tablet, Take 600 mg by mouth 2 (two) times daily., Disp: , Rfl:     ibuprofen (ADVIL,MOTRIN) 800 MG tablet, Take 800 mg by mouth every 8 (eight) hours., Disp: , Rfl: 1    losartan (COZAAR) 100 MG tablet, Take 1 tablet (100 mg total) by mouth once daily., Disp: 90 tablet, Rfl: 3    pramipexole (MIRAPEX) 0.25 MG tablet, Take 1 tablet (0.25 mg total) by mouth 3 (three) times daily., Disp: 90 tablet, Rfl: 1    simvastatin (ZOCOR) 20 MG tablet, TAKE 1/2 TABLET BY MOUTH DAILY, Disp: 45 tablet, Rfl: 1    tafluprost, PF, (ZIOPTAN, PF,) 0.0015 % Dpet, Place 1 drop into both eyes every evening., Disp: 30 each, Rfl: 6    tamsulosin (FLOMAX) 0.4 mg Cp24, Take 0.4 mg by mouth once daily. , Disp: , Rfl:     cevimeline (EVOXAC) 30 mg capsule, Take 1 capsule (30 mg total) by mouth 3 (three) times daily., Disp: 90 capsule, Rfl: 3    cyclobenzaprine (FLEXERIL) 10 MG tablet, Take 1 tablet (10 mg total) by mouth 3 (three) times daily as needed for Muscle spasms., Disp: 50 tablet, Rfl: 2    dapsone 25 MG Tab, 1 po bid, Disp: , Rfl:     diazepam (VALIUM) 5 MG tablet, TAKE 1 TABLET EVERY 12 HOURS AS NEEDED SPASMS, Disp: , Rfl: 0    lubiprostone (AMITIZA) 24 MCG Cap, Take 1 capsule (24 mcg total) by mouth 2 (two) times daily with meals., Disp: 60 capsule, Rfl: 3    methocarbamol (ROBAXIN) 750 MG Tab, Take 500 mg by mouth  4 (four) times daily., Disp: , Rfl:     mycophenolate (CELLCEPT) 500 mg Tab, 3 in the AM and 3 in the PM, Disp: , Rfl:     naloxegol (MOVANTIK) tablet, Take 25 mg by mouth once daily., Disp: 30 tablet, Rfl: 2    oxycodone-acetaminophen (PERCOCET) 7.5-325 mg per tablet, Take 1 tablet by mouth every 8 (eight) hours as needed., Disp: , Rfl: 0    ranitidine (ZANTAC) 300 MG tablet, Take 1 tablet (300 mg total) by mouth every evening., Disp: 90 tablet, Rfl: 3    traZODone (DESYREL) 50 MG tablet, Take 1 tablet (50 mg total) by mouth every evening., Disp: 30 tablet, Rfl: 1    VESICARE 10 mg tablet, Take 10 mg by mouth once daily., Disp: , Rfl: 0    zolpidem (AMBIEN) 5 MG Tab, 5 mg nightly as needed. , Disp: , Rfl: 0      Says the swelling has gotten better.  Never started the Lasix.  Wants to push back is cardiology appointment back to July.    Today talking about his previous diagnosis of restless legs apple.  Takes baclofen and pramipexole now and then.  Works well when taking together at bedtime.  Otherwise he is up all night with cattle prod movements of his arms and legs without much pain. The jumping keeps him awake.  No tremor.  Also takes gabapentin for my nerves.  Often takes at together with ibuprofen    I also found to be mildly anemic a few months ago.  We will recheck this again today    Review of Systems   Constitutional: Negative for activity change, appetite change and fever.   HENT: Negative for sore throat.    Respiratory: Negative for cough and shortness of breath.    Cardiovascular: Negative for chest pain.   Gastrointestinal: Negative for abdominal pain, diarrhea and nausea.   Genitourinary: Negative for difficulty urinating.   Musculoskeletal: Negative for arthralgias and myalgias.   Neurological: Negative for dizziness and headaches.   Psychiatric/Behavioral: Positive for sleep disturbance.       Objective:      Vitals:    03/25/19 0759   BP: 136/63   Pulse: (!) 58   Temp: 97.6 °F (36.4  "°C)   SpO2: 99%   Weight: 89.7 kg (197 lb 12 oz)   Height: 5' 10" (1.778 m)   PainSc:   6     Physical Exam   Constitutional: He is oriented to person, place, and time. He appears well-developed and well-nourished. No distress.   HENT:   Head: Normocephalic.   Mouth/Throat: No oropharyngeal exudate.   Neck: Neck supple. No thyromegaly present.   Cardiovascular: Normal rate, regular rhythm and normal heart sounds.   No murmur heard.  Pulmonary/Chest: Effort normal and breath sounds normal. He has no wheezes. He has no rales.   Abdominal: Soft. He exhibits no distension.   Musculoskeletal: He exhibits no edema.   Lymphadenopathy:     He has no cervical adenopathy.   Neurological: He is alert and oriented to person, place, and time.   No resting tremor   Skin: Skin is warm and dry. He is not diaphoretic.   Psychiatric: He has a normal mood and affect. His behavior is normal. Judgment and thought content normal.   Nursing note and vitals reviewed.      Assessment:       1. Restless leg syndrome    2. Anemia, unspecified type    3. Peripheral edema    4. HTN (hypertension), benign    5. Atrial fibrillation, unspecified type        Plan:   Restless leg syndrome  Comments:  Previous diagnosis.  Taking multiple medications for this.  Neurology consult to review diagnosis and treatment  Orders:  -     Ambulatory consult to Neurology    Anemia, unspecified type  Comments:  Recent change.  CBC today  Orders:  -     CBC auto differential; Future; Expected date: 03/25/2019    Peripheral edema  Comments:  Self limited.  Change cardiology appointment from April to July    HTN (hypertension), benign  Comments:  Controlled    Atrial fibrillation, unspecified type  Comments:  Controlled          "

## 2019-03-28 DIAGNOSIS — D64.9 ANEMIA, UNSPECIFIED TYPE: Primary | ICD-10-CM

## 2019-04-01 ENCOUNTER — TELEPHONE (OUTPATIENT)
Dept: NEUROLOGY | Facility: CLINIC | Age: 79
End: 2019-04-01

## 2019-04-01 NOTE — TELEPHONE ENCOUNTER
----- Message from Celina Lawson sent at 4/1/2019 10:13 AM CDT -----  Type:  Sooner Apoointment Request    Caller is requesting a sooner appointment.  Caller declined first available appointment listed below.  Caller will not accept being placed on the waitlist and is requesting a message be sent to doctor.  Name of Caller: Pt wife (Jenna)  When is the first available appointment? May 2, 2019  Symptoms: Severe back pain//spasms  Would the patient rather a call back or a response via [facility]?  Call back  Best Call Back Number:   [phone]  Additional Information:  States is calling to ask if possible for pt to be worked in before the next available//with any of the Neurology Dr//please call asap//kevin//armond

## 2019-04-01 NOTE — TELEPHONE ENCOUNTER
Returned call to let him know we dont have any openings sooner than may2.left/m 4/1/191/19/1044/sf

## 2019-04-03 ENCOUNTER — OFFICE VISIT (OUTPATIENT)
Dept: HEMATOLOGY/ONCOLOGY | Facility: CLINIC | Age: 79
End: 2019-04-03
Payer: MEDICARE

## 2019-04-03 ENCOUNTER — LAB VISIT (OUTPATIENT)
Dept: LAB | Facility: HOSPITAL | Age: 79
End: 2019-04-03
Attending: INTERNAL MEDICINE
Payer: MEDICARE

## 2019-04-03 VITALS
DIASTOLIC BLOOD PRESSURE: 77 MMHG | HEART RATE: 55 BPM | BODY MASS INDEX: 27.87 KG/M2 | OXYGEN SATURATION: 99 % | SYSTOLIC BLOOD PRESSURE: 147 MMHG | TEMPERATURE: 98 F | WEIGHT: 194.69 LBS | HEIGHT: 70 IN

## 2019-04-03 DIAGNOSIS — D51.8 OTHER VITAMIN B12 DEFICIENCY ANEMIAS: ICD-10-CM

## 2019-04-03 DIAGNOSIS — D64.9 CHRONIC ANEMIA: ICD-10-CM

## 2019-04-03 LAB
ALBUMIN SERPL BCP-MCNC: 4 G/DL (ref 3.5–5.2)
ALP SERPL-CCNC: 71 U/L (ref 55–135)
ALT SERPL W/O P-5'-P-CCNC: 23 U/L (ref 10–44)
ANION GAP SERPL CALC-SCNC: 8 MMOL/L (ref 8–16)
AST SERPL-CCNC: 24 U/L (ref 10–40)
BASOPHILS # BLD AUTO: 0.01 K/UL (ref 0–0.2)
BASOPHILS NFR BLD: 0.2 % (ref 0–1.9)
BILIRUB SERPL-MCNC: 0.5 MG/DL (ref 0.1–1)
BUN SERPL-MCNC: 16 MG/DL (ref 8–23)
CALCIUM SERPL-MCNC: 9.6 MG/DL (ref 8.7–10.5)
CHLORIDE SERPL-SCNC: 103 MMOL/L (ref 95–110)
CO2 SERPL-SCNC: 28 MMOL/L (ref 23–29)
CREAT SERPL-MCNC: 0.8 MG/DL (ref 0.5–1.4)
DIFFERENTIAL METHOD: NORMAL
EOSINOPHIL # BLD AUTO: 0.1 K/UL (ref 0–0.5)
EOSINOPHIL NFR BLD: 2.5 % (ref 0–8)
ERYTHROCYTE [DISTWIDTH] IN BLOOD BY AUTOMATED COUNT: 13.6 % (ref 11.5–14.5)
EST. GFR  (AFRICAN AMERICAN): >60 ML/MIN/1.73 M^2
EST. GFR  (NON AFRICAN AMERICAN): >60 ML/MIN/1.73 M^2
FERRITIN SERPL-MCNC: 74 NG/ML (ref 20–300)
FOLATE SERPL-MCNC: 12.7 NG/ML (ref 4–24)
GLUCOSE SERPL-MCNC: 86 MG/DL (ref 70–110)
HCT VFR BLD AUTO: 43.8 % (ref 40–54)
HGB BLD-MCNC: 14.4 G/DL (ref 14–18)
LYMPHOCYTES # BLD AUTO: 1 K/UL (ref 1–4.8)
LYMPHOCYTES NFR BLD: 18.5 % (ref 18–48)
MCH RBC QN AUTO: 30.2 PG (ref 27–31)
MCHC RBC AUTO-ENTMCNC: 32.9 G/DL (ref 32–36)
MCV RBC AUTO: 92 FL (ref 82–98)
MONOCYTES # BLD AUTO: 0.5 K/UL (ref 0.3–1)
MONOCYTES NFR BLD: 9 % (ref 4–15)
NEUTROPHILS # BLD AUTO: 3.7 K/UL (ref 1.8–7.7)
NEUTROPHILS NFR BLD: 69.8 % (ref 38–73)
PLATELET # BLD AUTO: 151 K/UL (ref 150–350)
PMV BLD AUTO: 10.4 FL (ref 9.2–12.9)
POTASSIUM SERPL-SCNC: 3.8 MMOL/L (ref 3.5–5.1)
PROT SERPL-MCNC: 7 G/DL (ref 6–8.4)
RBC # BLD AUTO: 4.77 M/UL (ref 4.6–6.2)
SODIUM SERPL-SCNC: 139 MMOL/L (ref 136–145)
VIT B12 SERPL-MCNC: 403 PG/ML (ref 210–950)
WBC # BLD AUTO: 5.23 K/UL (ref 3.9–12.7)

## 2019-04-03 PROCEDURE — 82746 ASSAY OF FOLIC ACID SERUM: CPT

## 2019-04-03 PROCEDURE — 36415 COLL VENOUS BLD VENIPUNCTURE: CPT

## 2019-04-03 PROCEDURE — 80053 COMPREHEN METABOLIC PANEL: CPT

## 2019-04-03 PROCEDURE — 82607 VITAMIN B-12: CPT

## 2019-04-03 PROCEDURE — 99205 OFFICE O/P NEW HI 60 MIN: CPT | Mod: S$PBB,,, | Performed by: INTERNAL MEDICINE

## 2019-04-03 PROCEDURE — 99205 PR OFFICE/OUTPT VISIT, NEW, LEVL V, 60-74 MIN: ICD-10-PCS | Mod: S$PBB,,, | Performed by: INTERNAL MEDICINE

## 2019-04-03 PROCEDURE — 84165 PROTEIN E-PHORESIS SERUM: CPT | Mod: 26,,, | Performed by: PATHOLOGY

## 2019-04-03 PROCEDURE — 99999 PR PBB SHADOW E&M-EST. PATIENT-LVL IV: ICD-10-PCS | Mod: PBBFAC,,, | Performed by: INTERNAL MEDICINE

## 2019-04-03 PROCEDURE — 84165 PATHOLOGIST INTERPRETATION SPE: ICD-10-PCS | Mod: 26,,, | Performed by: PATHOLOGY

## 2019-04-03 PROCEDURE — 99999 PR PBB SHADOW E&M-EST. PATIENT-LVL IV: CPT | Mod: PBBFAC,,, | Performed by: INTERNAL MEDICINE

## 2019-04-03 PROCEDURE — 82728 ASSAY OF FERRITIN: CPT

## 2019-04-03 PROCEDURE — 85025 COMPLETE CBC W/AUTO DIFF WBC: CPT

## 2019-04-03 PROCEDURE — 84165 PROTEIN E-PHORESIS SERUM: CPT

## 2019-04-03 PROCEDURE — 99214 OFFICE O/P EST MOD 30 MIN: CPT | Mod: PBBFAC | Performed by: INTERNAL MEDICINE

## 2019-04-03 NOTE — PROGRESS NOTES
Subjective:       Patient ID: Aston Avendano is a 79 y.o. male.    Chief Complaint: No chief complaint on file.  \  MAIN COMPLAINT: we are asked by dr Tim Frank to evaluate this 79 year old  male in regards to his anemia.  This gentleman had a cbc on 3/25/2019 that was reported as follows:  Lab Results   Component Value Date    WBC 4.80 03/25/2019    HGB 12.9 (L) 03/25/2019    HCT 39.6 (L) 03/25/2019    MCV 92 03/25/2019     03/25/2019       This represents a drop in his Hemoglobin since 3 years prior his HGB had been 15.5  He had a colonoscopy in 12/2016 which was non contributory.  He denies blood in the stools    ALLERGIES: He says he is not allergic to codeine or oxycodone as listed  MEDICATIONS: see med card  SURGERIES: Right eye cataract surgery, glaucoma surgery, left carotids surgery, right wrist surgery, 5 back surgeriesmright knee replacement, pacemaker, right heel spur.  SOCIAL HX:  with 2 children. [place]. Smokes a pack a week x 50 years ,stopped at age 62. Drinks a pint of whiskey a week  FAMILY HX: sister had cancer of some kind. two brothers have diabetes. One brother had a heart attack.  PMH:  1-Normocytic anemia  2-s/p pacemaker for bradycardia  3-hx of alcohol/tobacco use  4-HBP  5-s/p multiple orthopedic surgeries      HPI     Review of Systems   Constitutional: Negative.  Negative for fatigue.   Eyes: Negative.    Cardiovascular: Negative.  Negative for chest pain.   Gastrointestinal: Negative for abdominal pain and nausea.   Genitourinary: Negative.  Negative for hematuria.   Musculoskeletal: Negative.    Skin: Negative.    Neurological: Negative.    Psychiatric/Behavioral: Negative.        Objective:      Physical Exam   Constitutional: He is oriented to person, place, and time. He appears well-developed and well-nourished.   HENT:   Head: Normocephalic.   Mouth/Throat: No oropharyngeal exudate.   Eyes: Pupils are equal, round, and reactive to light.   Neck:  No thyromegaly present.   Cardiovascular: Normal rate, regular rhythm and normal heart sounds. Exam reveals no gallop.   No murmur heard.  Pacemaker left upper chest   Pulmonary/Chest: No respiratory distress. He has no wheezes. He has no rales.   Abdominal: Soft. Bowel sounds are normal. He exhibits no distension and no mass. There is no rebound and no guarding.   Musculoskeletal: Normal range of motion. He exhibits no edema.   Lymphadenopathy:     He has no cervical adenopathy.   Neurological: He is alert and oriented to person, place, and time.   Skin: Skin is warm and dry.   Psychiatric: He has a normal mood and affect. His behavior is normal.       Wt Readings from Last 3 Encounters:   04/03/19 88.3 kg (194 lb 10.7 oz)   03/25/19 89.7 kg (197 lb 12 oz)   02/18/19 91.2 kg (201 lb 1 oz)     Temp Readings from Last 3 Encounters:   04/03/19 97.6 °F (36.4 °C) (Oral)   03/25/19 97.6 °F (36.4 °C)   02/18/19 97 °F (36.1 °C) (Temporal)     BP Readings from Last 3 Encounters:   04/03/19 (!) 147/77   03/25/19 136/63   02/18/19 130/68     Pulse Readings from Last 3 Encounters:   04/03/19 (!) 55   03/25/19 (!) 58   02/18/19 82       Assessment:       1. Chronic anemia    2. Other vitamin B12 deficiency anemias        Plan:       Patient ahs mild normocytic anemia. We will order a repeat cbc, cmp, spep, ferritin, tibc, folate and b12. We will ask him to collect stools x 3 for occult blood. Wants to see me next time I come to [place].

## 2019-04-05 LAB
ALBUMIN SERPL ELPH-MCNC: 4.09 G/DL (ref 3.35–5.55)
ALPHA1 GLOB SERPL ELPH-MCNC: 0.3 G/DL (ref 0.17–0.41)
ALPHA2 GLOB SERPL ELPH-MCNC: 0.76 G/DL (ref 0.43–0.99)
B-GLOBULIN SERPL ELPH-MCNC: 0.67 G/DL (ref 0.5–1.1)
GAMMA GLOB SERPL ELPH-MCNC: 0.98 G/DL (ref 0.67–1.58)
PATHOLOGIST INTERPRETATION SPE: NORMAL
PROT SERPL-MCNC: 6.8 G/DL (ref 6–8.4)

## 2019-04-15 ENCOUNTER — LAB VISIT (OUTPATIENT)
Dept: LAB | Facility: HOSPITAL | Age: 79
End: 2019-04-15
Attending: INTERNAL MEDICINE
Payer: MEDICARE

## 2019-04-15 DIAGNOSIS — D64.9 CHRONIC ANEMIA: ICD-10-CM

## 2019-04-15 LAB
OB PNL STL: NEGATIVE
OB PNL STL: NEGATIVE

## 2019-04-15 PROCEDURE — 82272 OCCULT BLD FECES 1-3 TESTS: CPT | Mod: 91

## 2019-04-23 ENCOUNTER — CLINICAL SUPPORT (OUTPATIENT)
Dept: CARDIOLOGY | Facility: CLINIC | Age: 79
End: 2019-04-23
Attending: INTERNAL MEDICINE
Payer: MEDICARE

## 2019-04-23 DIAGNOSIS — I49.5 SINUS NODE DYSFUNCTION: ICD-10-CM

## 2019-04-23 DIAGNOSIS — Z95.0 CARDIAC PACEMAKER IN SITU: ICD-10-CM

## 2019-04-23 DIAGNOSIS — I44.2 INTERMITTENT COMPLETE HEART BLOCK: ICD-10-CM

## 2019-04-23 PROCEDURE — 93294 REM INTERROG EVL PM/LDLS PM: CPT | Mod: ,,, | Performed by: INTERNAL MEDICINE

## 2019-04-23 PROCEDURE — 93294 PACEMAKER REMOTE: ICD-10-PCS | Mod: ,,, | Performed by: INTERNAL MEDICINE

## 2019-04-23 PROCEDURE — 93296 REM INTERROG EVL PM/IDS: CPT | Mod: PBBFAC,PN | Performed by: INTERNAL MEDICINE

## 2019-05-02 ENCOUNTER — OFFICE VISIT (OUTPATIENT)
Dept: NEUROLOGY | Facility: CLINIC | Age: 79
End: 2019-05-02
Payer: MEDICARE

## 2019-05-02 VITALS
SYSTOLIC BLOOD PRESSURE: 108 MMHG | BODY MASS INDEX: 27.61 KG/M2 | WEIGHT: 192.88 LBS | HEIGHT: 70 IN | DIASTOLIC BLOOD PRESSURE: 48 MMHG | HEART RATE: 60 BPM | RESPIRATION RATE: 20 BRPM

## 2019-05-02 DIAGNOSIS — G25.81 RLS (RESTLESS LEGS SYNDROME): ICD-10-CM

## 2019-05-02 PROCEDURE — 99999 PR PBB SHADOW E&M-EST. PATIENT-LVL IV: CPT | Mod: PBBFAC,,, | Performed by: PSYCHIATRY & NEUROLOGY

## 2019-05-02 PROCEDURE — 99999 PR PBB SHADOW E&M-EST. PATIENT-LVL IV: ICD-10-PCS | Mod: PBBFAC,,, | Performed by: PSYCHIATRY & NEUROLOGY

## 2019-05-02 PROCEDURE — 99204 OFFICE O/P NEW MOD 45 MIN: CPT | Mod: S$PBB,,, | Performed by: PSYCHIATRY & NEUROLOGY

## 2019-05-02 PROCEDURE — 99204 PR OFFICE/OUTPT VISIT, NEW, LEVL IV, 45-59 MIN: ICD-10-PCS | Mod: S$PBB,,, | Performed by: PSYCHIATRY & NEUROLOGY

## 2019-05-02 PROCEDURE — 99214 OFFICE O/P EST MOD 30 MIN: CPT | Mod: PBBFAC | Performed by: PSYCHIATRY & NEUROLOGY

## 2019-05-02 RX ORDER — PRAMIPEXOLE DIHYDROCHLORIDE 0.25 MG/1
0.25 TABLET ORAL 3 TIMES DAILY
Qty: 90 TABLET | Refills: 11 | Status: SHIPPED | OUTPATIENT
Start: 2019-05-02 | End: 2019-11-26 | Stop reason: SDUPTHER

## 2019-05-02 RX ORDER — OXYBUTYNIN CHLORIDE 10 MG/1
TABLET, EXTENDED RELEASE ORAL
COMMUNITY
Start: 2019-04-22 | End: 2019-08-21

## 2019-05-02 NOTE — LETTER
May 2, 2019      Tim Frank MD  [address] [place] [id number]           [facility]  [address] [place] [id number]  Phone: [phone]  Fax: [phone]          Patient: Aston Avendano   MR Number: [id number]   YOB: 1940   Date of Visit: 5/2/2019       Dear Dr. Tim Frank:    Thank you for referring Aston Avendano to me for evaluation. Attached you will find relevant portions of my assessment and plan of care.    If you have questions, please do not hesitate to call me. I look forward to following Aston Avendano along with you.    Sincerely,    Brittanie Oh MD    Enclosure  CC:  No Recipients    If you would like to receive this communication electronically, please contact [email or web address] or [phone] to request more information on [facility] [facility] access.    For providers and/or their staff who would like to refer a patient to [facility], please contact us through our one-stop-shop provider referral line, [facility], at [phone].    If you feel you have received this communication in error or would no longer like to receive these types of communications, please e-mail [email or web address]

## 2019-05-02 NOTE — PROGRESS NOTES
Consult Requested By: No att. providers found  Reason for Consult:   Involuntary occasional jerks.    SUBJECTIVE:       HPI:   HISTORY OF PRESENT ILLNESS:  This is a 79-year-old right-handed gentleman,   presented today in the clinic with the complaint of occasional involuntary jerks   of the body.  He said that he has a long history of restless leg syndrome.  He   also has a pacemaker and spinal stimulator placed.  He has noticed that when he   is relaxing or resting sometimes his legs or arms, shoulder twitches.  Sometimes   it is embarrassing.  At night it becomes worse.  He said that his restless leg   is happening over four or five years.  At the same time, he also has a similar   problem, it does in the leg.  Now, he has noted that sometimes he feels the same   pain in shoulder and in the arm.  When he is busy it does not happen, only when   he is resting.  He used to take Mirapex 0.25 mg one a day, but prescription   showed that he was directed to take 3 a day.  He is not sure he is taking this   medication or not.  Doctor has tested him for labs and ferritin level, which   came out okay.      AK/HN  dd: 05/02/2019 08:58:00 (CDT)  td: 05/02/2019 21:42:45 (CDT)  Doc ID   #[id number]  Job ID #[id number]    CC:     This office note has been dictated.      Past Medical History:   Diagnosis Date    Arthritis     Cardiac syncope 2011    s/p pacemaker     Colon cancer screening 12/5/2016    Glaucoma     History of carotid artery stenosis 9/26/2013    No stenosis on scan done 11/2014. S/p CEA     Hypertension      Past Surgical History:   Procedure Laterality Date    CARDIAC PACEMAKER PLACEMENT      CARDIAC PACEMAKER PLACEMENT      CAROTID ENDARTERECTOMY      CATARACT EXTRACTION W/  INTRAOCULAR LENS IMPLANT  OD 1/5/12    CATARACT EXTRACTION W/  INTRAOCULAR LENS IMPLANT  OS date unknown    COLONOSCOPY N/A 12/5/2016    Performed by [facility] at [facility]    LUMBAR FUSION  2013    RHIZOTOMY W/  RADIOFREQUENCY ABLATION  2010     Family History   Problem Relation Age of Onset    Hypertension Mother     Hypertension Father      Social History     Tobacco Use    Smoking status: Former Smoker     Packs/day: 0.25     Years: 50.00     Pack years: 12.50     Last attempt to quit: 2006     Years since quittin.0    Smokeless tobacco: Never Used   Substance Use Topics    Alcohol use: Yes     Comment: OCC    Drug use: No     Review of patient's allergies indicates:   Allergen Reactions    Codeine Itching     Other reaction(s): Itching    Oxycodone Itching     Pt states it makes his nose itch and he does not like it  Other reaction(s): Itching  Pt states it makes his nose itch and he does not like it  Pt states it makes his nose itch and he does not like it       Review of Systems   Constitutional: Negative for fever and weight loss.   HENT: Negative for hearing loss.    Eyes: Negative for blurred vision, double vision, photophobia and pain.   Respiratory: Negative for cough.    Cardiovascular: Negative for chest pain.   Gastrointestinal: Negative for abdominal pain, nausea and vomiting.   Genitourinary: Negative for dysuria, frequency and urgency.   Musculoskeletal: Positive for back pain, joint pain and neck pain. Negative for falls and myalgias.   Skin: Negative for itching and rash.   Neurological: Negative for tingling and headaches.        Twitching and restless legs    Psychiatric/Behavioral: Negative for depression and memory loss.         OBJECTIVE:     Vital Signs (Most Recent)  Pulse: 60 (19)  Resp: 20 (19)  BP: (Abnormal) 108/48 (19)    Physical Exam   Constitutional: He is oriented to person, place, and time. He appears well-developed and well-nourished. No distress.   HENT:   Head: Normocephalic.   Right Ear: External ear normal.   Left Ear: External ear normal.   Mouth/Throat: Oropharynx is clear and moist.   Eyes: Pupils are equal, round, and reactive to  light. Conjunctivae and EOM are normal.   Neck: Normal range of motion. Neck supple. No tracheal deviation present. No thyromegaly present.   Cardiovascular: Regular rhythm, normal heart sounds and intact distal pulses.   Pulmonary/Chest: Effort normal and breath sounds normal. No respiratory distress.   Abdominal: Soft. Bowel sounds are normal. There is no tenderness.   Musculoskeletal: He exhibits no edema or tenderness.        Arms:  Distal phalange amputated.   Lymphadenopathy:     He has no cervical adenopathy.   Neurological: He is alert and oriented to person, place, and time. He has normal strength and normal reflexes. He displays no tremor and normal reflexes. No cranial nerve deficit or sensory deficit. He exhibits normal muscle tone. Gait abnormal. Coordination normal. He displays no Babinski's sign on the right side. He displays no Babinski's sign on the left side.   Reflex Scores:       Tricep reflexes are 2+ on the right side and 2+ on the left side.       Bicep reflexes are 2+ on the right side and 2+ on the left side.       Brachioradialis reflexes are 2+ on the right side and 2+ on the left side.       Patellar reflexes are 2+ on the right side and 2+ on the left side.       Achilles reflexes are 2+ on the right side and 2+ on the left side.  Walk with walker. Back pain and slow in getting up. Antalgic gait.   Skin: Skin is warm and dry. No rash noted. He is not diaphoretic. No erythema. No pallor.   Psychiatric: He has a normal mood and affect. His behavior is normal. Judgment and thought content normal.         Strength  Deltoids Triceps Biceps Wrist Extension Wrist Flexion Hand    Upper: R 5/5 5/5 5/5 5/5 5/5 5/5    L 5/5 5/5 5/5 5/5 5/5 5/5     Iliopsoas Quadriceps Knee  Flexion Tibialis  anterior Gastro- cnemius EHL   Lower: R 5/5 5/5 5/5 5/5 5/5 5/5    L 5/5 5/5 5/5 5/5 5/5 5/5     Laboratory:  Lab Results   Component Value Date    WBC 5.23 04/03/2019    HGB 14.4 04/03/2019    HCT 43.8  04/03/2019     04/03/2019    CHOL 141 01/18/2019    TRIG 67 01/18/2019    HDL 44 01/18/2019    ALT 23 04/03/2019    AST 24 04/03/2019     04/03/2019    K 3.8 04/03/2019     04/03/2019    CREATININE 0.8 04/03/2019    BUN 16 04/03/2019    CO2 28 04/03/2019    TSH 0.598 12/10/2018    PSA 1.73 03/15/2012    INR 1.1 07/22/2009             ASSESSMENT/PLAN:     Primary Diagnoses:  Problem List Items Addressed This Visit     RLS (restless legs syndrome)    Overview     He  Has restless leg syndrome for long time. Mirapex was given but he is not compliant. Probably he has restless leg syndrome which has extended to other limbs. He has multiple other medical problems.  Will follow. Advised to take mirapex 0.25 mg bid tapering up to tid.                   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    Hyperlipidemia    Cardiac pacemaker    HTN (hypertension), benign    BPH (benign prostatic hyperplasia)    GERD (gastroesophageal reflux disease)    Chronic back pain greater than 3 months duration    Abdominal aortic atherosclerosis - seen on AAA screening scan    Glaucoma (increased eye pressure)    Insufficiency of tear film of both eyes    Trichiasis of right eyelid    Bilateral carotid artery stenosis    Closed fracture of right thumb    Disturbance of skin sensation    Colon cancer screening    Chronic anemia    Other vitamin B12 deficiency anemias        Plan: will see PRN.

## 2019-06-25 ENCOUNTER — HOSPITAL ENCOUNTER (EMERGENCY)
Facility: HOSPITAL | Age: 79
Discharge: HOME | End: 2019-06-25
Attending: EMERGENCY MEDICINE
Payer: MEDICARE

## 2019-06-25 ENCOUNTER — OFFICE VISIT (OUTPATIENT)
Dept: FAMILY MEDICINE | Facility: CLINIC | Age: 79
End: 2019-06-25
Payer: MEDICARE

## 2019-06-25 VITALS
HEIGHT: 70 IN | SYSTOLIC BLOOD PRESSURE: 143 MMHG | BODY MASS INDEX: 25.38 KG/M2 | OXYGEN SATURATION: 99 % | HEART RATE: 53 BPM | WEIGHT: 177.25 LBS | TEMPERATURE: 97 F | DIASTOLIC BLOOD PRESSURE: 65 MMHG

## 2019-06-25 VITALS
HEIGHT: 70 IN | RESPIRATION RATE: 20 BRPM | DIASTOLIC BLOOD PRESSURE: 66 MMHG | HEART RATE: 50 BPM | BODY MASS INDEX: 27.24 KG/M2 | SYSTOLIC BLOOD PRESSURE: 160 MMHG | WEIGHT: 190.25 LBS | OXYGEN SATURATION: 98 % | TEMPERATURE: 98 F

## 2019-06-25 DIAGNOSIS — L03.032 CELLULITIS OF TOE OF LEFT FOOT: Primary | ICD-10-CM

## 2019-06-25 DIAGNOSIS — S90.412A INFECTED ABRASION OF GREAT TOE OF LEFT FOOT, INITIAL ENCOUNTER: Primary | ICD-10-CM

## 2019-06-25 DIAGNOSIS — L08.9 INFECTED ABRASION OF GREAT TOE OF LEFT FOOT, INITIAL ENCOUNTER: Primary | ICD-10-CM

## 2019-06-25 DIAGNOSIS — L02.612 ABSCESS OF TOE OF LEFT FOOT: ICD-10-CM

## 2019-06-25 LAB
ALBUMIN SERPL BCP-MCNC: 3.7 G/DL (ref 3.5–5.2)
ALP SERPL-CCNC: 75 U/L (ref 55–135)
ALT SERPL W/O P-5'-P-CCNC: 24 U/L (ref 10–44)
ANION GAP SERPL CALC-SCNC: 9 MMOL/L (ref 8–16)
AST SERPL-CCNC: 31 U/L (ref 10–40)
BASOPHILS # BLD AUTO: 0.01 K/UL (ref 0–0.2)
BASOPHILS NFR BLD: 0.1 % (ref 0–1.9)
BILIRUB SERPL-MCNC: 0.6 MG/DL (ref 0.1–1)
BUN SERPL-MCNC: 29 MG/DL (ref 8–23)
CALCIUM SERPL-MCNC: 9.8 MG/DL (ref 8.7–10.5)
CHLORIDE SERPL-SCNC: 100 MMOL/L (ref 95–110)
CO2 SERPL-SCNC: 27 MMOL/L (ref 23–29)
CREAT SERPL-MCNC: 0.9 MG/DL (ref 0.5–1.4)
CRP SERPL-MCNC: 21.4 MG/L (ref 0–8.2)
DIFFERENTIAL METHOD: ABNORMAL
EOSINOPHIL # BLD AUTO: 0 K/UL (ref 0–0.5)
EOSINOPHIL NFR BLD: 0.6 % (ref 0–8)
ERYTHROCYTE [DISTWIDTH] IN BLOOD BY AUTOMATED COUNT: 13 % (ref 11.5–14.5)
ERYTHROCYTE [SEDIMENTATION RATE] IN BLOOD BY WESTERGREN METHOD: 21 MM/HR (ref 0–10)
EST. GFR  (AFRICAN AMERICAN): >60 ML/MIN/1.73 M^2
EST. GFR  (NON AFRICAN AMERICAN): >60 ML/MIN/1.73 M^2
GLUCOSE SERPL-MCNC: 92 MG/DL (ref 70–110)
HCT VFR BLD AUTO: 40 % (ref 40–54)
HGB BLD-MCNC: 14 G/DL (ref 14–18)
LYMPHOCYTES # BLD AUTO: 0.9 K/UL (ref 1–4.8)
LYMPHOCYTES NFR BLD: 12.7 % (ref 18–48)
MCH RBC QN AUTO: 31.4 PG (ref 27–31)
MCHC RBC AUTO-ENTMCNC: 35 G/DL (ref 32–36)
MCV RBC AUTO: 90 FL (ref 82–98)
MONOCYTES # BLD AUTO: 0.5 K/UL (ref 0.3–1)
MONOCYTES NFR BLD: 7.2 % (ref 4–15)
NEUTROPHILS # BLD AUTO: 5.7 K/UL (ref 1.8–7.7)
NEUTROPHILS NFR BLD: 79.5 % (ref 38–73)
PLATELET # BLD AUTO: 139 K/UL (ref 150–350)
PMV BLD AUTO: 10.6 FL (ref 9.2–12.9)
POTASSIUM SERPL-SCNC: 4 MMOL/L (ref 3.5–5.1)
PROT SERPL-MCNC: 7.1 G/DL (ref 6–8.4)
RBC # BLD AUTO: 4.46 M/UL (ref 4.6–6.2)
SODIUM SERPL-SCNC: 136 MMOL/L (ref 136–145)
WBC # BLD AUTO: 7.18 K/UL (ref 3.9–12.7)

## 2019-06-25 PROCEDURE — 87040 BLOOD CULTURE FOR BACTERIA: CPT | Mod: 59

## 2019-06-25 PROCEDURE — 85025 COMPLETE CBC W/AUTO DIFF WBC: CPT

## 2019-06-25 PROCEDURE — 85651 RBC SED RATE NONAUTOMATED: CPT

## 2019-06-25 PROCEDURE — 99999 PR PBB SHADOW E&M-EST. PATIENT-LVL III: ICD-10-PCS | Mod: PBBFAC,,, | Performed by: FAMILY MEDICINE

## 2019-06-25 PROCEDURE — 99284 EMERGENCY DEPT VISIT MOD MDM: CPT | Mod: 25,27

## 2019-06-25 PROCEDURE — 80053 COMPREHEN METABOLIC PANEL: CPT

## 2019-06-25 PROCEDURE — 36415 COLL VENOUS BLD VENIPUNCTURE: CPT

## 2019-06-25 PROCEDURE — 99213 PR OFFICE/OUTPT VISIT, EST, LEVL III, 20-29 MIN: ICD-10-PCS | Mod: S$PBB,,, | Performed by: FAMILY MEDICINE

## 2019-06-25 PROCEDURE — 25000003 PHARM REV CODE 250: Performed by: EMERGENCY MEDICINE

## 2019-06-25 PROCEDURE — 99213 OFFICE O/P EST LOW 20 MIN: CPT | Mod: S$PBB,,, | Performed by: FAMILY MEDICINE

## 2019-06-25 PROCEDURE — 96366 THER/PROPH/DIAG IV INF ADDON: CPT

## 2019-06-25 PROCEDURE — 99999 PR PBB SHADOW E&M-EST. PATIENT-LVL III: CPT | Mod: PBBFAC,,, | Performed by: FAMILY MEDICINE

## 2019-06-25 PROCEDURE — 86140 C-REACTIVE PROTEIN: CPT

## 2019-06-25 PROCEDURE — 63600175 PHARM REV CODE 636 W HCPCS: Performed by: EMERGENCY MEDICINE

## 2019-06-25 PROCEDURE — 99213 OFFICE O/P EST LOW 20 MIN: CPT | Mod: PBBFAC,PO,25 | Performed by: FAMILY MEDICINE

## 2019-06-25 PROCEDURE — 96365 THER/PROPH/DIAG IV INF INIT: CPT

## 2019-06-25 PROCEDURE — 96367 TX/PROPH/DG ADDL SEQ IV INF: CPT

## 2019-06-25 RX ORDER — VANCOMYCIN HCL IN 5 % DEXTROSE 1G/250ML
1000 PLASTIC BAG, INJECTION (ML) INTRAVENOUS
Status: DISCONTINUED | OUTPATIENT
Start: 2019-06-25 | End: 2019-06-25 | Stop reason: HOSPADM

## 2019-06-25 RX ORDER — DOXYCYCLINE 100 MG/1
100 CAPSULE ORAL 2 TIMES DAILY
Qty: 20 CAPSULE | Refills: 0 | Status: SHIPPED | OUTPATIENT
Start: 2019-06-25 | End: 2019-07-05

## 2019-06-25 RX ADMIN — PIPERACILLIN AND TAZOBACTAM 4.5 G: 4; .5 INJECTION, POWDER, LYOPHILIZED, FOR SOLUTION INTRAVENOUS; PARENTERAL at 10:06

## 2019-06-25 RX ADMIN — VANCOMYCIN HYDROCHLORIDE 2500 MG: 100 INJECTION, POWDER, LYOPHILIZED, FOR SOLUTION INTRAVENOUS at 11:06

## 2019-06-25 NOTE — PROGRESS NOTES
Pharmacokinetic Initial Assessment: IV Vancomycin    Assessment/Plan:    Initiate intravenous vancomycin with loading dose of 2500 mg once followed by a maintenance dose of vancomycin 1000 mg IV every 12 hours  Desired empiric serum trough concentration is 10 to 20 mcg/mL.  Draw vancomycin trough level 30 min prior to fourth dose on 6/26/19 at approximately 2300.     Pharmacy will continue to follow and monitor vancomycin.      Please contact pharmacy at extension [phone] with any questions regarding this assessment.     Thank you for the consult,   Dulce Maria Dyer PharmD     Patient brief summary:  Aston Avendano is a 79 y.o. male initiated on antimicrobial therapy with IV Vancomycin for treatment of suspected skin & soft tissue infection.    Drug Allergies:   Review of patient's allergies indicates:   Allergen Reactions    Codeine Itching     Other reaction(s): Itching    Oxycodone Itching     Pt states it makes his nose itch and he does not like it  Other reaction(s): Itching  Pt states it makes his nose itch and he does not like it  Pt states it makes his nose itch and he does not like it     Actual Body Weight:   86.3 kg    Renal Function:   Estimated Creatinine Clearance: 71 mL/min (based on SCr of 0.9 mg/dL).,     CBC (last 72 hours):  Recent Labs   Lab Result Units 06/25/19  1013   WBC K/uL 7.18   Hemoglobin g/dL 14.0   Hematocrit % 40.0   Platelets K/uL 139*   Gran% % 79.5*   Lymph% % 12.7*   Mono% % 7.2   Eosinophil% % 0.6   Basophil% % 0.1   Differential Method  Automated     Metabolic Panel (last 72 hours):  Recent Labs   Lab Result Units 06/25/19  1013   Sodium mmol/L 136   Potassium mmol/L 4.0   Chloride mmol/L 100   CO2 mmol/L 27   Glucose mg/dL 92   BUN, Bld mg/dL 29*   Creatinine mg/dL 0.9   Albumin g/dL 3.7   Total Bilirubin mg/dL 0.6   Alkaline Phosphatase U/L 75   AST U/L 31   ALT U/L 24     Drug levels (last 3 results):  No results for input(s): VANCOMYCINRA, VANCOMYCINPE, VANCOMYCINTR in the last  72 hours.    Microbiologic Results:  Microbiology Results (last 7 days)       Procedure Component Value Units Date/Time    Blood Culture #2 **CANNOT BE ORDERED STAT** [[id number]] Collected:  06/25/19 1049    Order Status:  Sent Specimen:  Blood from Peripheral, Antecubital, Right Updated:  06/25/19 1053    Blood Culture #1 **CANNOT BE ORDERED STAT** [[id number]] Collected:  06/25/19 1013    Order Status:  Sent Specimen:  Blood from Peripheral, Hand, Right Updated:  06/25/19 1022          Thank you for allowing us to participate in this patient's care.     Dulce Maria Dyer PharmD 06/25/2019 12:17 PM

## 2019-06-25 NOTE — PROGRESS NOTES
Subjective:       Patient ID: Aston Avendano is a 79 y.o. male.    Chief Complaint: No chief complaint on file.      HPI Comments:       Current Outpatient Medications:     aspirin (ECOTRIN) 81 MG EC tablet, Take by mouth. 1 Tablet, Delayed Release (E.C.) Oral Every day, Disp: , Rfl:     calcium-vitamins B6-B12-FA Tab, Take by mouth., Disp: , Rfl:     chlorthalidone (HYGROTEN) 25 MG Tab, TAKE 1 TABLET ONE DAY AND 1/2 TABLET THE NEXT DAY AS DIRECTED, Disp: 30 tablet, Rfl: 11    finasteride (PROSCAR) 5 mg tablet, Take 5 mg by mouth once daily. , Disp: , Rfl:     fish oil-omega-3 fatty acids 300-1,000 mg capsule, Take 2 g by mouth once daily., Disp: , Rfl:     gabapentin (NEURONTIN) 600 MG tablet, Take 600 mg by mouth 2 (two) times daily., Disp: , Rfl:     ibuprofen (ADVIL,MOTRIN) 800 MG tablet, Take 800 mg by mouth every 8 (eight) hours., Disp: , Rfl: 1    losartan (COZAAR) 100 MG tablet, Take 1 tablet (100 mg total) by mouth once daily., Disp: 90 tablet, Rfl: 3    oxybutynin (DITROPAN-XL) 10 MG 24 hr tablet, TAKE 1 TABLET DAILY, Disp: , Rfl:     oxycodone-acetaminophen (PERCOCET) 7.5-325 mg per tablet, Take 1 tablet by mouth every 8 (eight) hours as needed., Disp: , Rfl: 0    pramipexole (MIRAPEX) 0.25 MG tablet, Take 1 tablet (0.25 mg total) by mouth 3 (three) times daily., Disp: 90 tablet, Rfl: 11    simvastatin (ZOCOR) 20 MG tablet, TAKE 1/2 TABLET BY MOUTH DAILY, Disp: 45 tablet, Rfl: 1    tafluprost, PF, (ZIOPTAN, PF,) 0.0015 % Dpet, Place 1 drop into both eyes every evening., Disp: 30 each, Rfl: 6    tamsulosin (FLOMAX) 0.4 mg Cp24, Take 0.4 mg by mouth once daily. , Disp: , Rfl:     zolpidem (AMBIEN) 5 MG Tab, 5 mg nightly as needed. , Disp: , Rfl: 0    ranitidine (ZANTAC) 300 MG tablet, Take 1 tablet (300 mg total) by mouth every evening., Disp: 90 tablet, Rfl: 3      Presents today with a rapidly developing swelling and redness of his left great toe.  No much pain.  Thinks it all developed  "within the last 24-48 hours.  No fever or chills.  No trauma.  Has a history of onychomycosis.  Even ambulate with a cane, as per usual.    Review of Systems   Constitutional: Negative for activity change, appetite change and fever.   HENT: Negative for sore throat.    Respiratory: Negative for cough and shortness of breath.    Cardiovascular: Negative for chest pain.   Gastrointestinal: Negative for abdominal pain, diarrhea and nausea.   Genitourinary: Negative for difficulty urinating.   Musculoskeletal: Negative for arthralgias and myalgias.   Skin: Positive for color change.   Neurological: Negative for dizziness and headaches.       Objective:      Vitals:    06/25/19 0807   BP: (!) 143/65   Pulse: (!) 53   Temp: 96.5 °F (35.8 °C)   SpO2: 99%   Weight: 80.4 kg (177 lb 4 oz)   Height: 5' 10" (1.778 m)   PainSc:   5     Physical Exam   Constitutional: He is oriented to person, place, and time. He appears well-developed and well-nourished. No distress.   HENT:   Head: Normocephalic.   Neck: Neck supple. No thyromegaly present.   Cardiovascular: Normal rate, regular rhythm and normal heart sounds.   No murmur heard.  Pulmonary/Chest: Effort normal and breath sounds normal. He has no wheezes. He has no rales.   Abdominal: Soft. He exhibits no distension.   Musculoskeletal: He exhibits no edema.        Feet:    Lymphadenopathy:     He has no cervical adenopathy.   Neurological: He is alert and oriented to person, place, and time.   Skin: Skin is warm and dry. He is not diaphoretic.   Psychiatric: He has a normal mood and affect. His behavior is normal. Judgment and thought content normal.   Nursing note and vitals reviewed.      Assessment:       1. Infected abrasion of great toe of left foot, initial encounter        Plan:   Infected abrasion of great toe of left foot, initial encounter  Comments:  Rapidly progressive.  Nonpainful.  No systemic signs or symptoms.  Unable to get same day podiatry consult.  To ER for " evaluation

## 2019-06-25 NOTE — ED PROVIDER NOTES
SCRIBE #1 NOTE: I, Corinne Mack, am scribing for, and in the presence of, Eleanor Bocanegra MD. I have scribed the entire note.      History      Chief Complaint   Patient presents with    Toe Injury     L big toe infection with redness and swelling, sent by doctor        Review of patient's allergies indicates:   Allergen Reactions    Codeine Itching     Other reaction(s): Itching    Oxycodone Itching     Pt states it makes his nose itch and he does not like it  Other reaction(s): Itching  Pt states it makes his nose itch and he does not like it  Pt states it makes his nose itch and he does not like it        HPI   HPI    6/25/2019, 9:14 AM   History obtained from the patient      History of Present Illness: Aston Avendano is a 79 y.o. male patient with PMHx of arthritis and HTN who presents to the Emergency Department for L great toe swelling which onset gradually 3 days ago. Pt was seen by his PCP this morning for his toe, but was referred to the ED for further evaluation and a podiatry consult. Symptoms are constant and moderate in severity. No mitigating or exacerbating factors reported. Associated sxs include L great toe redness. Pt denies any pain, but also states that he has limited feeling in his BLE from previous back surgeries. Patient denies any fever, chills, CP, SOB, N/V/D, abd pain, back pain, neck pain, HA, dizziness, and all other sxs at this time. No prior Tx reported. No further complaints or concerns at this time.         Arrival mode: Personal vehicle    PCP: Tim Frank MD       Past Medical History:  Past Medical History:   Diagnosis Date    Arthritis     Cardiac syncope 2011    s/p pacemaker     Colon cancer screening 12/5/2016    Glaucoma     History of carotid artery stenosis 9/26/2013    No stenosis on scan done 11/2014. S/p CEA     Hypertension        Past Surgical History:  Past Surgical History:   Procedure Laterality Date    CARDIAC PACEMAKER PLACEMENT      CARDIAC  PACEMAKER PLACEMENT      CAROTID ENDARTERECTOMY      CATARACT EXTRACTION W/  INTRAOCULAR LENS IMPLANT  OD 12    CATARACT EXTRACTION W/  INTRAOCULAR LENS IMPLANT  OS date unknown    COLONOSCOPY N/A 2016    [facility]    LUMBAR FUSION  2013    RHIZOTOMY W/ RADIOFREQUENCY ABLATION  2010         Family History:  Family History   Problem Relation Age of Onset    Hypertension Mother     Hypertension Father        Social History:  Social History     Tobacco Use    Smoking status: Former Smoker     Packs/day: 0.25     Years: 50.00     Pack years: 12.50     Last attempt to quit: 2006     Years since quittin.1    Smokeless tobacco: Never Used   Substance and Sexual Activity    Alcohol use: Yes     Comment: OCC    Drug use: No    Sexual activity: Unknown       ROS   Review of Systems   Constitutional: Negative for chills and fever.   Respiratory: Negative for cough and shortness of breath.    Cardiovascular: Negative for chest pain and leg swelling.   Gastrointestinal: Negative for abdominal pain, diarrhea, nausea and vomiting.   Musculoskeletal: Negative for back pain, neck pain and neck stiffness.        (+) L great toe swelling  (+) L great toe redness  (-) L great toe pain   Skin: Negative for rash and wound.   Neurological: Negative for dizziness, light-headedness, numbness and headaches.   All other systems reviewed and are negative.    Physical Exam      Initial Vitals [19 0902]   BP Pulse Resp Temp SpO2   (!) 123/49 (!) 52 20 97.9 °F (36.6 °C) 98 %      MAP       --          Physical Exam  Nursing Notes and Vital Signs Reviewed.  Constitutional: Patient is in no acute distress. Well-developed and well-nourished. Non-toxic appearing. Non-ill appearing.  Head: Atraumatic. Normocephalic.  Eyes: PERRL. EOM intact. Conjunctivae are not pale. No scleral icterus.  ENT: Mucous membranes are moist. Oropharynx is clear and symmetric.    Neck: Supple. Full ROM.  "No lymphadenopathy.  Cardiovascular: Regular rate. Regular rhythm. No murmurs, rubs, or gallops. Distal pulses are 2+ and symmetric.  Pulmonary/Chest: No respiratory distress. Clear to auscultation bilaterally. No wheezing or rales.  Abdominal: Soft and non-distended.  There is no tenderness.  No rebound, guarding, or rigidity.   Musculoskeletal: Moves all extremities. No obvious deformities. No edema. Mild soft tissue swelling and warmth to the dorsum of the L foot. There is L great toe swelling, redness, and warmth with some fluctuance. There is a small ulcer to the base of the L great toe with no active drainage. See images below for further details.          Skin: Warm and dry.  Neurological:  Alert, awake, and appropriate.  Normal speech.  No acute focal neurological deficits are appreciated.  Psychiatric: Normal affect. Good eye contact. Appropriate in content.    ED Course    Procedures  ED Vital Signs:  Vitals:    06/25/19 0902 06/25/19 1120   BP: (!) 123/49 (!) 140/65   Pulse: (!) 52 (!) 50   Resp: 20    Temp: 97.9 °F (36.6 °C)    TempSrc: Oral    SpO2: 98% 98%   Weight: 86.3 kg (190 lb 4.1 oz)    Height: 5' 10" (1.778 m)        Abnormal Lab Results:  Labs Reviewed   CBC W/ AUTO DIFFERENTIAL - Abnormal; Notable for the following components:       Result Value    RBC 4.46 (*)     Mean Corpuscular Hemoglobin 31.4 (*)     Platelets 139 (*)     Lymph # 0.9 (*)     Gran% 79.5 (*)     Lymph% 12.7 (*)     All other components within normal limits   COMPREHENSIVE METABOLIC PANEL - Abnormal; Notable for the following components:    BUN, Bld 29 (*)     All other components within normal limits   C-REACTIVE PROTEIN - Abnormal; Notable for the following components:    CRP 21.4 (*)     All other components within normal limits   SEDIMENTATION RATE - Abnormal; Notable for the following components:    Sed Rate 21 (*)     All other components within normal limits   CULTURE, BLOOD   CULTURE, BLOOD        All Lab " Results:  Results for orders placed or performed during the hospital encounter of 06/25/19   CBC auto differential   Result Value Ref Range    WBC 7.18 3.90 - 12.70 K/uL    RBC 4.46 (L) 4.60 - 6.20 M/uL    Hemoglobin 14.0 14.0 - 18.0 g/dL    Hematocrit 40.0 40.0 - 54.0 %    Mean Corpuscular Volume 90 82 - 98 fL    Mean Corpuscular Hemoglobin 31.4 (H) 27.0 - 31.0 pg    Mean Corpuscular Hemoglobin Conc 35.0 32.0 - 36.0 g/dL    RDW 13.0 11.5 - 14.5 %    Platelets 139 (L) 150 - 350 K/uL    MPV 10.6 9.2 - 12.9 fL    Gran # (ANC) 5.7 1.8 - 7.7 K/uL    Lymph # 0.9 (L) 1.0 - 4.8 K/uL    Mono # 0.5 0.3 - 1.0 K/uL    Eos # 0.0 0.0 - 0.5 K/uL    Baso # 0.01 0.00 - 0.20 K/uL    Gran% 79.5 (H) 38.0 - 73.0 %    Lymph% 12.7 (L) 18.0 - 48.0 %    Mono% 7.2 4.0 - 15.0 %    Eosinophil% 0.6 0.0 - 8.0 %    Basophil% 0.1 0.0 - 1.9 %    Differential Method Automated    Comprehensive metabolic panel   Result Value Ref Range    Sodium 136 136 - 145 mmol/L    Potassium 4.0 3.5 - 5.1 mmol/L    Chloride 100 95 - 110 mmol/L    CO2 27 23 - 29 mmol/L    Glucose 92 70 - 110 mg/dL    BUN, Bld 29 (H) 8 - 23 mg/dL    Creatinine 0.9 0.5 - 1.4 mg/dL    Calcium 9.8 8.7 - 10.5 mg/dL    Total Protein 7.1 6.0 - 8.4 g/dL    Albumin 3.7 3.5 - 5.2 g/dL    Total Bilirubin 0.6 0.1 - 1.0 mg/dL    Alkaline Phosphatase 75 55 - 135 U/L    AST 31 10 - 40 U/L    ALT 24 10 - 44 U/L    Anion Gap 9 8 - 16 mmol/L    eGFR if African American >60 >60 mL/min/1.73 m^2    eGFR if non African American >60 >60 mL/min/1.73 m^2   C-reactive protein   Result Value Ref Range    CRP 21.4 (H) 0.0 - 8.2 mg/L   Sedimentation rate   Result Value Ref Range    Sed Rate 21 (H) 0 - 10 mm/Hr       Imaging Results:  Imaging Results          X-Ray Toe 2 or More Views Left (Final result)  Result time 06/25/19 11:05:14    Final result by AROLDO Arellano Sr., MD (06/25/19 11:05:14)                 Impression:      1. There is mild prominence of the soft tissue thickness in the dorsum of the left  foot.  This is consistent with the patient's history and characteristic of cellulitis.  2. There is no radiographic evidence of acute osteomyelitis.  3. There is a moderate amount of calcification medial to the 1st metatarsophalangeal joint.  This is characteristic of prior trauma to the medial collateral ligament.      Electronically signed by: Gaetano Arellano MD  Date:    06/25/2019  Time:    11:05             Narrative:    EXAMINATION:  XR TOE 2 OR MORE VIEWS LEFT    CLINICAL HISTORY:  left great toe wound;    COMPARISON:  10/27/2000    FINDINGS:  There is no acute fracture visualized.  There is no dislocation.  There is a moderate amount of calcification medial to the 1st metatarsophalangeal joint.  There is mild prominence of the soft tissue thickness in the dorsum of the left foot.  There is no radiographic evidence of acute osteomyelitis.                                        The Emergency Provider reviewed the vital signs and test results, which are outlined above.    ED Discussion     12:07 PM: Re-evaluated pt. Pt is resting comfortably and is in no acute distress.  Pt states .  D/w pt all pertinent results. D/w pt any concerns expressed at this time. Answered all questions. Pt expresses understanding at this time.    12:17 PM: Dr. Bocanegra discussed the pt's case with Dr. Gramajo (Podiatry) who recommends having the pt f/u with Dr. Montiel (Podiatry) in the clinic tomorrow at 8:15 AM. An appointment has been made for the pt.    12:21 PM: Reassessed pt at this time. Pt is awake, alert, and in no distress. Discussed with pt all pertinent ED information and results. Discussed pt dx and plan of tx. Gave pt all f/u and return to the ED instructions. All questions and concerns were addressed at this time. Pt expresses understanding of information and instructions, and is comfortable with plan to discharge. Pt is stable for discharge.    I discussed with patient and/or family/caretaker that evaluation in the ED  does not suggest any emergent or life threatening medical conditions requiring immediate intervention beyond what was provided in the ED, and I believe patient is safe for discharge.  Regardless, an unremarkable evaluation in the ED does not preclude the development or presence of a serious of life threatening condition. As such, patient was instructed to return immediately for any worsening or change in current symptoms.    I discussed wound care precautions with patient and/or family/caretaker; specifically that all wounds have risk of infection despite efforts to cleanse and debride the wound; and there is a risk of an occult foreign body (and thus increased risk of infection) despite a negative examination.  I discussed with patient need to return for any signs of infection, specifically redness, increased pain, fever, drainage of pus, or any concern, immediately.      ED Medication(s):  Medications   vancomycin (VANCOCIN) 2,500 mg in dextrose 5 % 500 mL IVPB (2,500 mg Intravenous New Bag 6/25/19 1147)   vancomycin in dextrose 5 % 1 gram/250 mL IVPB 1,000 mg (1,000 mg Intravenous Trough Due 30 minutes Before Dose 6/26/19 2300)   piperacillin-tazobactam 4.5 g in dextrose 5 % 100 mL IVPB (ready to mix system) (0 g Intravenous Stopped 6/25/19 1123)          Medication List      START taking these medications    doxycycline 100 MG Cap  Commonly known as:  VIBRAMYCIN  Take 1 capsule (100 mg total) by mouth 2 (two) times daily. for 10 days        ASK your doctor about these medications    aspirin 81 MG EC tablet  Commonly known as:  ECOTRIN     calcium-vitamins B6-B12-FA Tab     chlorthalidone 25 MG Tab  Commonly known as:  HYGROTEN  TAKE 1 TABLET ONE DAY AND 1/2 TABLET THE NEXT DAY AS DIRECTED     finasteride 5 mg tablet  Commonly known as:  PROSCAR     fish oil-omega-3 fatty acids 300-1,000 mg capsule     gabapentin 600 MG tablet  Commonly known as:  NEURONTIN     ibuprofen 800 MG tablet  Commonly known as:   ADVIL,MOTRIN     losartan 100 MG tablet  Commonly known as:  COZAAR  Take 1 tablet (100 mg total) by mouth once daily.     oxybutynin 10 MG 24 hr tablet  Commonly known as:  DITROPAN-XL     pramipexole 0.25 MG tablet  Commonly known as:  MIRAPEX  Take 1 tablet (0.25 mg total) by mouth 3 (three) times daily.     ranitidine 300 MG tablet  Commonly known as:  ZANTAC  Take 1 tablet (300 mg total) by mouth every evening.     simvastatin 20 MG tablet  Commonly known as:  ZOCOR  TAKE 1/2 TABLET BY MOUTH DAILY     tafluprost (PF) 0.0015 % Dpet  Commonly known as:  ZIOPTAN (PF)  Place 1 drop into both eyes every evening.     zolpidem 5 MG Tab  Commonly known as:  AMBIEN           Where to Get Your Medications      These medications were sent to [facility] [address] [facility]  [address] [facility] [place] [address]    Phone:  [phone]   · doxycycline 100 MG Cap         Follow-up Information     Baron Montiel DPM On 6/26/2019.    Specialty:  Podiatry  Why:  at 815 am, For wound re-check  Return to the Emergency Room, If symptoms worsen  Contact information:  [address] [place] [address] [address] [place] [address] [phone]                     Medical Decision Making    Medical Decision Making:   Clinical Tests:   Lab Tests: Ordered and Reviewed  Radiological Study: Ordered and Reviewed           Scribe Attestation:   Scribe #1: I performed the above scribed service and the documentation accurately describes the services I performed. I attest to the accuracy of the note 06/25/2019.    Attending:   Physician Attestation Statement for Scribe #1: I, Eleanor Bocanegra MD, personally performed the services described in this documentation, as scribed by Corinne Mack, in my presence, and it is both accurate and complete.          Clinical Impression       ICD-10-CM ICD-9-CM   1. Cellulitis of toe of left foot L03.032 681.10   2. Abscess of toe of left foot L02.612 681.10       Disposition:   Disposition:  Discharged  Condition: Stable           Eleanor Bocanegra MD  06/25/19 4284

## 2019-06-26 ENCOUNTER — OFFICE VISIT (OUTPATIENT)
Dept: PODIATRY | Facility: CLINIC | Age: 79
End: 2019-06-26
Payer: MEDICARE

## 2019-06-26 VITALS
HEIGHT: 70 IN | HEART RATE: 53 BPM | BODY MASS INDEX: 27.46 KG/M2 | WEIGHT: 191.81 LBS | DIASTOLIC BLOOD PRESSURE: 60 MMHG | SYSTOLIC BLOOD PRESSURE: 105 MMHG

## 2019-06-26 DIAGNOSIS — L03.032 CELLULITIS OF GREAT TOE OF LEFT FOOT: ICD-10-CM

## 2019-06-26 DIAGNOSIS — L97.522 NEUROPATHIC ULCER OF TOE OF LEFT FOOT WITH FAT LAYER EXPOSED: Primary | ICD-10-CM

## 2019-06-26 DIAGNOSIS — L60.0 INGROWING NAIL, LEFT GREAT TOE: ICD-10-CM

## 2019-06-26 DIAGNOSIS — B35.1 ONYCHOMYCOSIS: ICD-10-CM

## 2019-06-26 DIAGNOSIS — G60.9 IDIOPATHIC PERIPHERAL NEUROPATHY: ICD-10-CM

## 2019-06-26 DIAGNOSIS — L60.2 ONYCHOGRYPHOSIS: ICD-10-CM

## 2019-06-26 PROCEDURE — 11042 DBRDMT SUBQ TIS 1ST 20SQCM/<: CPT | Mod: 59,S$PBB,, | Performed by: PODIATRIST

## 2019-06-26 PROCEDURE — 99999 PR PBB SHADOW E&M-EST. PATIENT-LVL III: CPT | Mod: PBBFAC,,, | Performed by: PODIATRIST

## 2019-06-26 PROCEDURE — 11719 PR TRIM NAIL(S): ICD-10-PCS | Mod: 59,Q9,S$PBB, | Performed by: PODIATRIST

## 2019-06-26 PROCEDURE — 99214 OFFICE O/P EST MOD 30 MIN: CPT | Mod: 25,S$PBB,, | Performed by: PODIATRIST

## 2019-06-26 PROCEDURE — 11719 TRIM NAIL(S) ANY NUMBER: CPT | Mod: 59,Q9,PBBFAC | Performed by: PODIATRIST

## 2019-06-26 PROCEDURE — 11042 DBRDMT SUBQ TIS 1ST 20SQCM/<: CPT | Mod: PBBFAC | Performed by: PODIATRIST

## 2019-06-26 PROCEDURE — 11720 DEBRIDE NAIL 1-5: CPT | Mod: 59,Q9,S$PBB, | Performed by: PODIATRIST

## 2019-06-26 PROCEDURE — 99213 OFFICE O/P EST LOW 20 MIN: CPT | Mod: PBBFAC,25 | Performed by: PODIATRIST

## 2019-06-26 PROCEDURE — 11720 PR DEBRIDEMENT OF NAIL(S), 1-5: ICD-10-PCS | Mod: 59,Q9,S$PBB, | Performed by: PODIATRIST

## 2019-06-26 PROCEDURE — 99999 PR PBB SHADOW E&M-EST. PATIENT-LVL III: ICD-10-PCS | Mod: PBBFAC,,, | Performed by: PODIATRIST

## 2019-06-26 PROCEDURE — 99214 PR OFFICE/OUTPT VISIT, EST, LEVL IV, 30-39 MIN: ICD-10-PCS | Mod: 25,S$PBB,, | Performed by: PODIATRIST

## 2019-06-26 PROCEDURE — 11720 DEBRIDE NAIL 1-5: CPT | Mod: Q9,PBBFAC,59 | Performed by: PODIATRIST

## 2019-06-26 PROCEDURE — 11719 TRIM NAIL(S) ANY NUMBER: CPT | Mod: 59,Q9,S$PBB, | Performed by: PODIATRIST

## 2019-06-26 PROCEDURE — 11042 PR DEBRIDEMENT, SKIN, SUB-Q TISSUE,=<20 SQ CM: ICD-10-PCS | Mod: 59,S$PBB,, | Performed by: PODIATRIST

## 2019-06-26 RX ORDER — TRAMADOL HYDROCHLORIDE 50 MG/1
50 TABLET ORAL EVERY 6 HOURS PRN
Qty: 28 TABLET | Refills: 0 | Status: SHIPPED | OUTPATIENT
Start: 2019-06-26 | End: 2019-07-03

## 2019-06-26 RX ORDER — CIPROFLOXACIN 500 MG/1
500 TABLET ORAL EVERY 12 HOURS
Qty: 14 TABLET | Refills: 0 | Status: SHIPPED | OUTPATIENT
Start: 2019-06-26 | End: 2019-07-03

## 2019-06-26 RX ORDER — SILVER SULFADIAZINE 10 G/1000G
CREAM TOPICAL 2 TIMES DAILY
Qty: 50 G | Refills: 1 | Status: SHIPPED | OUTPATIENT
Start: 2019-06-26 | End: 2019-07-26

## 2019-06-26 NOTE — PROGRESS NOTES
Subjective:       Patient ID: Aston Avendano is a 79 y.o. male.    Chief Complaint: Nail Problem and Foot Ulcer      HPI: Aston Avendano presents to the clinic today, for initial evaluation and treatment concerning an ulceration to the dorsal left great toe.  The patient has been seen by a MD/DO/DPM prior for the aforementioned wound. Patient's Primary Care Provider is Tim Frank MD.  The PMHx. does include idiopathic peripheral neuropathy. The wound has been present for approximately 2 days.  Local woundcare product(s) most recently is/are dry dressing. Patient went to the ED on yesterday. Patient is not a DM. Patient was prescribed Doxycycline.  Patient denies a history of diabetes.  Patient denies systemic signs of infection.  Patient was given intravenous Vancomycin and Zosyn in the ED on yesterday.    Review of patient's allergies indicates:   Allergen Reactions    Codeine Itching     Other reaction(s): Itching    Oxycodone Itching     Pt states it makes his nose itch and he does not like it  Other reaction(s): Itching  Pt states it makes his nose itch and he does not like it  Pt states it makes his nose itch and he does not like it       Past Medical History:   Diagnosis Date    Arthritis     Cardiac syncope 2011    s/p pacemaker     Colon cancer screening 12/5/2016    Glaucoma     History of carotid artery stenosis 9/26/2013    No stenosis on scan done 11/2014. S/p CEA     Hypertension        Family History   Problem Relation Age of Onset    Hypertension Mother     Hypertension Father        Social History     Socioeconomic History    Marital status:      Spouse name: Not on file    Number of children: Not on file    Years of education: Not on file    Highest education level: Not on file   Occupational History    Not on file   Social Needs    Financial resource strain: Not on file    Food insecurity:     Worry: Not on file     Inability: Not on file    Transportation needs:      Medical: Not on file     Non-medical: Not on file   Tobacco Use    Smoking status: Former Smoker     Packs/day: 0.25     Years: 50.00     Pack years: 12.50     Last attempt to quit: 2006     Years since quittin.1    Smokeless tobacco: Never Used   Substance and Sexual Activity    Alcohol use: Yes     Comment: OCC    Drug use: No    Sexual activity: Not on file   Lifestyle    Physical activity:     Days per week: Not on file     Minutes per session: Not on file    Stress: Not on file   Relationships    Social connections:     Talks on phone: Not on file     Gets together: Not on file     Attends Zoroastrianism service: Not on file     Active member of club or organization: Not on file     Attends meetings of clubs or organizations: Not on file     Relationship status: Not on file   Other Topics Concern    Not on file   Social History Narrative    Not on file       Past Surgical History:   Procedure Laterality Date    CARDIAC PACEMAKER PLACEMENT      CARDIAC PACEMAKER PLACEMENT      CAROTID ENDARTERECTOMY      CATARACT EXTRACTION W/  INTRAOCULAR LENS IMPLANT  OD 12    CATARACT EXTRACTION W/  INTRAOCULAR LENS IMPLANT  OS date unknown    COLONOSCOPY N/A 2016    Performed by [facility]ler [facility] at [facility]    LUMBAR FUSION  2013    RHIZOTOMY W/ RADIOFREQUENCY ABLATION  2010       Review of Systems   Constitutional: Negative for chills, fatigue and fever.   HENT: Negative for hearing loss.    Eyes: Negative for photophobia and visual disturbance.   Respiratory: Negative for cough, chest tightness, shortness of breath and wheezing.    Cardiovascular: Negative for chest pain, palpitations and leg swelling.   Gastrointestinal: Negative for constipation, diarrhea, nausea and vomiting.   Endocrine: Negative for cold intolerance and heat intolerance.   Genitourinary: Positive for scrotal swelling. Negative for flank pain.   Musculoskeletal: Positive for gait problem. Negative for neck pain and neck  "stiffness.   Skin: Positive for color change and wound.   Neurological: Positive for numbness. Negative for light-headedness and headaches.   Psychiatric/Behavioral: Negative for sleep disturbance.          Objective:   /60 (BP Location: Right arm, Patient Position: Sitting, BP Method: Large (Automatic))   Pulse (!) 53   Ht 5' 10" (1.778 m)   Wt 87 kg (191 lb 12.8 oz)   BMI 27.52 kg/m²       LOWER EXTREMITY PHYSICAL EXAMINATION  NEUROLOGY: Protective sensation is not intact to the left and right plantar surfaces of the foot and digits, as the patient has no sensation/detection at greater than 4 distinct points of contact with 5.07 [place] Terri monofilament. Sensation to light touch is intact on the left and right foot. Proprioception is intact, bilateral. Sensation to pin prick is reduced to absent. Vibratory sensation is diminished.    VASCULAR: On the left and right foot, the dorsalis pedis pulse is 1/4 and the posterior tibial pulse is 0/4. Capillary refill time is less than 3 seconds. Hair growth is sparse to absent on the dorsum of the foot and at the digits. No rubor is present. Proximal to distal temperature is warm to warm.  Mild edema is noted to the bilateral lower extremity.    ORTHOPEDIC: Manual Muscle Testing is 5/5 in all planes on the left and right, without pains, with and without resistance. No pains to palpation of the medial or lateral ankle ligaments. No discomfort to palpation of the posterior tibial tendon, peroneal tendon, Achilles tendon or the anterior ankle tendons.  Rectus foot type is noted. No pain upon range of motion of the midfoot or hindfoot joints. No crepitus upon range of motion and midfoot or hindfoot joints. No ankle pathology is noted. Semi rigid hammertoe contractures are noted, bilateral lower extremity.  Hammertoe contractures are noted to the lower extremity.  Hallux malleus deformity is noted, left great toe.  Patient is ambulatory with assistance of a " rolling walker.  Equinus contracture is noted.    DERMATOLOGY:  The left great toenail plate is nearly avulsed totally.  It is loose with proximal, medial, lateral cellulitis with slight fluctuance.  There is no underlying subungual hematoma.  There is an ulceration to the level of subcutaneous tissues with fat layer exposure, left plantar hallux.  The wound measures 0.60 cm x 0.40 cm x 0.4 cm.  There is probe to bone, but there is no osseous exposure.  The bone feels firm to touch.  There is no undermining.  There is no sinus tract.  Granulation tissues are noted post debridement.  Slight to moderate malodor is noted. Copious exsanguination with debridement.  There are nail changes which are consistent with onychomycosis on the left and right foot. On the left and the right foot, the great toe nails are thickened, are dystrophic, with yellow discoloration, and with malodorous subungual debris. These thickened and dystrophic nails are painful to touch and palpation. The remaining nail plates are elongated, and are not suggestive of onychomycosis.     Assessment:     1. 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 Cellulitis of great toe of left foot    3. Idiopathic peripheral neuropathy    4. Onychogryphosis    5. 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 for 7 days  Dispense: 14 tablet; Refill: 0  -     traMADol (ULTRAM) 50 mg tablet; Take 1 tablet (50 mg total) by mouth every 6 (six) hours as needed for Pain.  Dispense: 28 tablet; Refill: 0  -     silver sulfADIAZINE 1% (SILVADENE) 1 % cream; Apply topically 2 (two) times daily.  Dispense: 50 g; Refill: 1    Thorough discussion is had with the patient today, concerning the diagnosis, its etiology, and the treatment algorithm at present.  XRAYS  are reviewed in detail with the patient. All questions and concerns regarding findings and its/their implications are outlined and discussed.  No plain film evidence of osteomyelitis.  Patient will start the Doxycycline.  Please add Ciprofloxacin.  Twice daily application of Silvadene.    The wound was surgically debrided after adequate prep with alcohol and/or betadine paint. Excisional wound debridement was performed using sharp #10/#15 blade/rounded scalpel and tissue nipper, with removal of all non-viable skin and soft tissues; necrotic skin/tissue formation. The woundbase/wound bed was also debrided to encourage bleeding as to promote/stimulate healing. Debridement was excisional and included epidermal, dermal and subcutaneous tissues. Post debridement measurements are as above. Hemostasis was achieved. Patient tolerated procedure well and without complications. Local woundcare with topical antibiotic ointment dressings and bandage thereafter.     Offloading with surgical shoes at all times.  Prescription is written for Orthot[place] [facility] [address] [place] [address], for lower extremity orthotic(s) management and/or shoe gear management.    Patient will follow up in approximately 10-14 days.  If no clinical improvement, will consider MRI evaluation and/or CHELSEY/PVR evaluation.    Idiopathic peripheral neuropathy  Neuropathic foot counseling and education is provided at this visit. Patient is advised to wear socks and shoes at all times.  Do not walk barefoot, or with just socks, even when indoors.  Be sure to check and inspect the inside of the shoe before putting them on her feet.  Protect your feet at all times.  Walking shoes and/or athletic shoes are the best types of shoe gear. Do not wear vinyl or plastic type shoe gear, as they do not stretch and/or breathe.  Protect your feet from hot and/or cold. Elevate the extremities when sitting.  Do not wear excessively tight  socks and/or shoe gear. Wiggle your toes for a few minutes throughout the day. Move your ankles up and down, in and out, to help blood flow in your lower extremity.     Onychogryphosis  Ingrowing nail left great  A TIME-OUT was completed. Oral, written and verbal consent were obtained from patient, prior to procedure being performed as discussed below.    The left great toe was anesthetized with a total of 3cc of 2% Lidocaine w/0 Epinephrine.  The area was then prepped appropriately with betadine paint. Following this, a [place] Elevator was utilized to free up the medial and lateral nail borders as well as the nail plate itself, from the surrounding soft tissues.  Next, a Platypus Nail Pulling Forcep was used to slowly free the nail from the eponychium.    Once freed from the soft tissue structures, the nail bed was visualized and no defects or lacerations were noted. No osseous exposure is noted. There is scant sanguinous drainage. No purulence is noted. A curette was then utilized to remove any and all debris from either nail fold. Sterile Adaptic, antibiotic cream and a sterile bandage with Coban was placed on the digit.      Patient was informed of the possible complications related to nail regrowth, e.g., nail dystrophy or onychomycosis. Patient was given written and oral instructions for care including the office phone number if any questions or concerns arise.      Patient to start daily application of topical ABx. ointment with a bandage.     Patient to follow up in approx. 10 to 14 days, if needed.    Onychomycosis  The onychomycotic nail plates, as outlined above, are sharply debrided with double action nail nipper, and/or with the assistance of a mechanical rotary chely, with removal of all offending nail and nail border(s), for reduction of pains. Nails are reduced in terms of length, width and girth with removal of subungual debris to facilitate pain free weight bearing and ambulation. The elongated nails  as outlined in the objective portion of this note, were trimmed to appropriate length, with a double action nail nipper, for alleviation/reduction of pains as well. Follow up in approx. 3-4 months.            Future Appointments   Date Time Provider Department Center   7/10/2019  8:15 AM Baron Montiel DPM [facility]   7/23/2019  8:00 AM HOME MONITOR DEVICE CHECK [facility] [place]   8/1/2019 10:45 AM Tereso Ewing MD [facility]   10/21/2019 10:00 AM PACEMAKER CLINIC, ON [facility]THMIA ON [facility]

## 2019-06-27 ENCOUNTER — TELEPHONE (OUTPATIENT)
Dept: PODIATRY | Facility: CLINIC | Age: 79
End: 2019-06-27

## 2019-06-27 NOTE — TELEPHONE ENCOUNTER
Called pt to no answer left message for pt to call back when he can please advise    Aicha Simon MA  Podiatry Surgical Department       ----- Message from Hui Soliz sent at 6/27/2019  8:53 AM CDT -----  Contact: self  Type:  Needs Medical Advice    Who Called: pt  Symptoms (please be specific):n/a  How long has patient had these symptoms:n/a  Pharmacy name and phone #:n/a  Would the patient rather a call back or a response via [facility]? Call back  Best Call Back Number: [phone]  Additional Information: requesting call back regarding medication provider prescribed pt on yesterday has him very sick.     Thanks,  Hui Soliz

## 2019-06-30 LAB
BACTERIA BLD CULT: NORMAL
BACTERIA BLD CULT: NORMAL

## 2019-07-05 ENCOUNTER — TELEPHONE (OUTPATIENT)
Dept: PODIATRY | Facility: CLINIC | Age: 79
End: 2019-07-05

## 2019-07-05 NOTE — TELEPHONE ENCOUNTER
Try calling pt no answer left message for pt to call back when he can Please advise    Aicha Simon MA  Podiatry Surgical Department        ----- Message from Sandra Herrera sent at 7/5/2019  8:39 AM CDT -----  Contact: Pt  Type:  Needs Medical Advice    Who Called: Aston (pt)  Symptoms (please be specific): Left leg pain  How long has patient had these symptoms:  1 1/2 weeks  Pharmacy name and phone #:    [facility] [place] - [address] [facility]  [address] [facility] [address]  Phone: [phone] Fax: [phone]  Would the patient rather a call back or a response via [facility]? Callback  Best Call Back Number:  [phone]  Additional Information: No

## 2019-07-11 ENCOUNTER — LAB VISIT (OUTPATIENT)
Dept: LAB | Facility: HOSPITAL | Age: 79
End: 2019-07-11
Attending: PODIATRIST
Payer: MEDICARE

## 2019-07-11 ENCOUNTER — OFFICE VISIT (OUTPATIENT)
Dept: PODIATRY | Facility: CLINIC | Age: 79
End: 2019-07-11
Payer: MEDICARE

## 2019-07-11 VITALS — BODY MASS INDEX: 27.46 KG/M2 | WEIGHT: 191.81 LBS | RESPIRATION RATE: 17 BRPM | HEIGHT: 70 IN

## 2019-07-11 DIAGNOSIS — L97.522 NEUROPATHIC ULCER OF TOE OF LEFT FOOT WITH FAT LAYER EXPOSED: Primary | ICD-10-CM

## 2019-07-11 DIAGNOSIS — L97.522 NEUROPATHIC ULCER OF TOE OF LEFT FOOT WITH FAT LAYER EXPOSED: ICD-10-CM

## 2019-07-11 DIAGNOSIS — G60.9 IDIOPATHIC PERIPHERAL NEUROPATHY: ICD-10-CM

## 2019-07-11 LAB — ERYTHROCYTE [SEDIMENTATION RATE] IN BLOOD BY WESTERGREN METHOD: 10 MM/HR (ref 0–10)

## 2019-07-11 PROCEDURE — 99999 PR PBB SHADOW E&M-EST. PATIENT-LVL III: ICD-10-PCS | Mod: PBBFAC,,, | Performed by: PODIATRIST

## 2019-07-11 PROCEDURE — 11042 DBRDMT SUBQ TIS 1ST 20SQCM/<: CPT | Mod: PBBFAC | Performed by: PODIATRIST

## 2019-07-11 PROCEDURE — 11042 PR DEBRIDEMENT, SKIN, SUB-Q TISSUE,=<20 SQ CM: ICD-10-PCS | Mod: S$PBB,,, | Performed by: PODIATRIST

## 2019-07-11 PROCEDURE — 87070 CULTURE OTHR SPECIMN AEROBIC: CPT

## 2019-07-11 PROCEDURE — 99214 OFFICE O/P EST MOD 30 MIN: CPT | Mod: 25,S$PBB,, | Performed by: PODIATRIST

## 2019-07-11 PROCEDURE — 99214 PR OFFICE/OUTPT VISIT, EST, LEVL IV, 30-39 MIN: ICD-10-PCS | Mod: 25,S$PBB,, | Performed by: PODIATRIST

## 2019-07-11 PROCEDURE — 99999 PR PBB SHADOW E&M-EST. PATIENT-LVL III: CPT | Mod: PBBFAC,,, | Performed by: PODIATRIST

## 2019-07-11 PROCEDURE — 85651 RBC SED RATE NONAUTOMATED: CPT

## 2019-07-11 PROCEDURE — 36415 COLL VENOUS BLD VENIPUNCTURE: CPT

## 2019-07-11 PROCEDURE — 11042 DBRDMT SUBQ TIS 1ST 20SQCM/<: CPT | Mod: S$PBB,,, | Performed by: PODIATRIST

## 2019-07-11 PROCEDURE — 86140 C-REACTIVE PROTEIN: CPT

## 2019-07-11 PROCEDURE — 99213 OFFICE O/P EST LOW 20 MIN: CPT | Mod: PBBFAC,25 | Performed by: PODIATRIST

## 2019-07-11 NOTE — PROGRESS NOTES
Subjective:       Patient ID: Aston Avendano is a 79 y.o. male.    Chief Complaint: Wound Check (Cellulitis, abscess on left foot, rates pain 3/10, wearing tennis,ambualtion without loulou,  diabetic, PCP Dr. Keys)      HPI: Patient presents to the clinic today, for follow up and treatment concerning an ulceration to the distal plantar aspect left great toe. Patient's Primary Care Provider is Tim Frank MD. The PMHx. does include idiopathic peripheral neuropathy. Local woundcare product(s) most recently is/are SSD.  At this time, patient denies systemic signs of infection, but does state redness and drainage to the left great toe.  Patient did complete a course of Ciprofloxacin.  He presents ambulatory with normal shoe gear, although he was dispensed bilateral surgical shoes at the last evaluation.    Review of patient's allergies indicates:   Allergen Reactions    Codeine Itching     Other reaction(s): Itching    Oxycodone Itching     Pt states it makes his nose itch and he does not like it  Other reaction(s): Itching  Pt states it makes his nose itch and he does not like it  Pt states it makes his nose itch and he does not like it       Past Medical History:   Diagnosis Date    Arthritis     Cardiac syncope 2011    s/p pacemaker     Colon cancer screening 12/5/2016    Glaucoma     History of carotid artery stenosis 9/26/2013    No stenosis on scan done 11/2014. S/p CEA     Hypertension        Family History   Problem Relation Age of Onset    Hypertension Mother     Hypertension Father        Social History     Socioeconomic History    Marital status:      Spouse name: Not on file    Number of children: Not on file    Years of education: Not on file    Highest education level: Not on file   Occupational History    Not on file   Social Needs    Financial resource strain: Not on file    Food insecurity:     Worry: Not on file     Inability: Not on file    Transportation needs:      Medical: Not on file     Non-medical: Not on file   Tobacco Use    Smoking status: Former Smoker     Packs/day: 0.25     Years: 50.00     Pack years: 12.50     Last attempt to quit: 2006     Years since quittin.2    Smokeless tobacco: Never Used   Substance and Sexual Activity    Alcohol use: Yes     Comment: OCC    Drug use: No    Sexual activity: Not on file   Lifestyle    Physical activity:     Days per week: Not on file     Minutes per session: Not on file    Stress: Not on file   Relationships    Social connections:     Talks on phone: Not on file     Gets together: Not on file     Attends Protestant service: Not on file     Active member of club or organization: Not on file     Attends meetings of clubs or organizations: Not on file     Relationship status: Not on file   Other Topics Concern    Not on file   Social History Narrative    Not on file       Past Surgical History:   Procedure Laterality Date    CARDIAC PACEMAKER PLACEMENT      CARDIAC PACEMAKER PLACEMENT      CAROTID ENDARTERECTOMY      CATARACT EXTRACTION W/  INTRAOCULAR LENS IMPLANT  OD 12    CATARACT EXTRACTION W/  INTRAOCULAR LENS IMPLANT  OS date unknown    COLONOSCOPY N/A 2016    [facility] ENDO    LUMBAR FUSION  2013    RHIZOTOMY W/ RADIOFREQUENCY ABLATION  2010       Review of Systems   Constitutional: Negative for chills, fatigue and fever.   HENT: Negative for hearing loss.    Eyes: Negative for photophobia and visual disturbance.   Respiratory: Negative for cough, chest tightness, shortness of breath and wheezing.    Cardiovascular: Negative for chest pain and palpitations.   Gastrointestinal: Negative for constipation, diarrhea, nausea and vomiting.   Endocrine: Negative for cold intolerance and heat intolerance.   Genitourinary: Negative for flank pain.   Musculoskeletal: Positive for arthralgias, back pain and gait problem. Negative for neck pain and neck stiffness.   Skin:  "Positive for wound.   Neurological: Positive for numbness. Negative for light-headedness and headaches.   Psychiatric/Behavioral: Negative for sleep disturbance.          Objective:   Resp 17   Ht 5' 10" (1.778 m)   Wt 87 kg (191 lb 12.8 oz)   BMI 27.52 kg/m²       LOWER EXTREMITY PHYSICAL EXAMINATION  NEUROLOGY: Protective sensation is not intact to the left and right plantar surfaces of the foot and digits, as the patient has no sensation/detection at greater than 4 distinct points of contact with 5.07 [place] Terri monofilament. Sensation to light touch is intact on the left and right foot. Proprioception is intact, bilateral. Sensation to pin prick is reduced to absent. Vibratory sensation is diminished.     VASCULAR:  Mild edema is noted to the bilateral lower extremity. On the left and right foot, the dorsalis pedis pulse is 1/4 and the posterior tibial pulse is 0/4. Capillary refill time is less than 3 seconds. Hair growth is sparse to absent on the dorsum of the foot and at the digits. No rubor is present. Proximal to distal temperature is warm to warm.     DERMATOLOGY:  Persistent ulceration at the distal plantar aspect of the left great toe.  The wound measures 1.1 cm x 1.20 cm x 0.10 cm.  Granulation tissues are noted. This wound is to the level of subcutaneous tissues fat layer exposure.  There is no malodor or drainage noted. No undermining is noted. No associated digital edema or erythema    Assessment:     1. 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    The wound was surgically debrided after adequate prep with alcohol and/or betadine paint. Excisional " wound debridement was performed using sharp #10/#15 blade/rounded scalpel and tissue nipper, with removal of all non-viable skin and soft tissues; necrotic skin/tissue formation. The woundbase/wound bed was also debrided to encourage bleeding as to promote/stimulate healing. Debridement was excisional and included epidermal, dermal and subcutaneous tissues. Post debridement measurements are as above. Hemostasis was achieved. Patient tolerated procedure well and without complications. Local woundcare with collagen dressings and bandage thereafter. Patient will change the collagen dressings Q3 - Q4 days in standard fashion.    Wound culture is taken. Patient did complete a course of Ciprofloxacin.  Patient may discontinue BID SSD.  Follow up in approximately 2 weeks.  Please obtain STAT ESR and CRP. No MRI due to pacemaker.             Future Appointments   Date Time Provider Department Center   7/11/2019  3:00 PM LABORATORY, [facility] [place]   7/23/2019  8:00 AM HOME MONITOR DEVICE CHECK [facility]   7/25/2019  2:00 PM Baron Montiel DPM [facility]   8/1/2019 10:45 AM Tereso Ewing MD [facility]   10/21/2019 10:00 AM PACEMAKER CLINIC, ON [facility]THMIA [facility]

## 2019-07-12 LAB — CRP SERPL-MCNC: 0.9 MG/L (ref 0–8.2)

## 2019-07-15 LAB — BACTERIA SPEC AEROBE CULT: NORMAL

## 2019-07-16 ENCOUNTER — DOCUMENTATION ONLY (OUTPATIENT)
Dept: FAMILY MEDICINE | Facility: CLINIC | Age: 79
End: 2019-07-16

## 2019-07-16 NOTE — PROGRESS NOTES
Spoke with Oliver at [facility]. He wanted to know if patient was still taking Flexeril for muscle spasms. Informed him that medication was discontinued in pt chart on 5/2/19. Medication list faxed to him at [phone].

## 2019-07-22 RX ORDER — CHLORTHALIDONE 25 MG/1
TABLET ORAL
Qty: 90 TABLET | Refills: 3 | Status: SHIPPED | OUTPATIENT
Start: 2019-07-22 | End: 2020-04-28 | Stop reason: SDUPTHER

## 2019-07-23 ENCOUNTER — CLINICAL SUPPORT (OUTPATIENT)
Dept: CARDIOLOGY | Facility: CLINIC | Age: 79
End: 2019-07-23
Attending: INTERNAL MEDICINE
Payer: MEDICARE

## 2019-07-23 DIAGNOSIS — Z95.0 CARDIAC PACEMAKER IN SITU: ICD-10-CM

## 2019-07-23 DIAGNOSIS — I49.5 SINUS NODE DYSFUNCTION: ICD-10-CM

## 2019-07-23 DIAGNOSIS — I44.2 INTERMITTENT COMPLETE HEART BLOCK: ICD-10-CM

## 2019-07-23 PROCEDURE — 93294 PACEMAKER REMOTE: ICD-10-PCS | Mod: ,,, | Performed by: INTERNAL MEDICINE

## 2019-07-23 PROCEDURE — 93294 REM INTERROG EVL PM/LDLS PM: CPT | Mod: ,,, | Performed by: INTERNAL MEDICINE

## 2019-07-23 PROCEDURE — 93296 REM INTERROG EVL PM/IDS: CPT | Mod: PBBFAC | Performed by: INTERNAL MEDICINE

## 2019-07-29 ENCOUNTER — TELEPHONE (OUTPATIENT)
Dept: FAMILY MEDICINE | Facility: CLINIC | Age: 79
End: 2019-07-29

## 2019-07-29 DIAGNOSIS — I65.29 STENOSIS OF CAROTID ARTERY: ICD-10-CM

## 2019-07-29 DIAGNOSIS — E78.5 DYSLIPIDEMIA, GOAL LDL BELOW 100: ICD-10-CM

## 2019-07-29 DIAGNOSIS — I70.0 ABDOMINAL AORTIC ATHEROSCLEROSIS: ICD-10-CM

## 2019-07-29 RX ORDER — SIMVASTATIN 20 MG/1
10 TABLET, FILM COATED ORAL DAILY
Qty: 45 TABLET | Refills: 4 | Status: SHIPPED | OUTPATIENT
Start: 2019-07-29 | End: 2019-12-27

## 2019-07-29 NOTE — TELEPHONE ENCOUNTER
----- Message from Kala Muro sent at 7/29/2019  4:30 PM CDT -----  Contact: Pt   Type:  RX Refill Request    Who Called: PT  Refill or New Rx: Refill  RX Name and Strength:Simvastatin 10 mg  How is the patient currently taking it? (ex. 1XDay): once daily  Is this a 30 day or 90 day RX: 30  Preferred Pharmacy with phone number: [facility]  Local or Mail Order:local  Ordering Provider: tal  Would the patient rather a call back or a response via [facility]? Call back   Best Call Back Number: [phone] (home)   Additional Information: n/a

## 2019-07-31 ENCOUNTER — OFFICE VISIT (OUTPATIENT)
Dept: PODIATRY | Facility: CLINIC | Age: 79
End: 2019-07-31
Payer: MEDICARE

## 2019-07-31 ENCOUNTER — TELEPHONE (OUTPATIENT)
Dept: FAMILY MEDICINE | Facility: CLINIC | Age: 79
End: 2019-07-31

## 2019-07-31 DIAGNOSIS — G60.9 IDIOPATHIC PERIPHERAL NEUROPATHY: ICD-10-CM

## 2019-07-31 DIAGNOSIS — L97.522 NEUROPATHIC ULCER OF TOE OF LEFT FOOT WITH FAT LAYER EXPOSED: Primary | ICD-10-CM

## 2019-07-31 PROCEDURE — 11042 DBRDMT SUBQ TIS 1ST 20SQCM/<: CPT | Mod: PBBFAC | Performed by: PODIATRIST

## 2019-07-31 PROCEDURE — 11042 PR DEBRIDEMENT, SKIN, SUB-Q TISSUE,=<20 SQ CM: ICD-10-PCS | Mod: S$PBB,,, | Performed by: PODIATRIST

## 2019-07-31 PROCEDURE — 99999 PR PBB SHADOW E&M-EST. PATIENT-LVL II: CPT | Mod: PBBFAC,,, | Performed by: PODIATRIST

## 2019-07-31 PROCEDURE — 99212 OFFICE O/P EST SF 10 MIN: CPT | Mod: PBBFAC | Performed by: PODIATRIST

## 2019-07-31 PROCEDURE — 11042 DBRDMT SUBQ TIS 1ST 20SQCM/<: CPT | Mod: S$PBB,,, | Performed by: PODIATRIST

## 2019-07-31 PROCEDURE — 99999 PR PBB SHADOW E&M-EST. PATIENT-LVL II: ICD-10-PCS | Mod: PBBFAC,,, | Performed by: PODIATRIST

## 2019-07-31 PROCEDURE — 99499 NO LOS: ICD-10-PCS | Mod: S$PBB,,, | Performed by: PODIATRIST

## 2019-07-31 PROCEDURE — 99499 UNLISTED E&M SERVICE: CPT | Mod: S$PBB,,, | Performed by: PODIATRIST

## 2019-07-31 NOTE — TELEPHONE ENCOUNTER
----- Message from Patricia Rodriguez sent at 7/31/2019  4:42 PM CDT -----  Contact: Pt   Type:  Patient Returning Call    Who Called:pt   Who Left Message for Patient:nurse for Dr   Does the patient know what this is regarding?:prescription   Would the patient rather a call back or a response via [facility]? Phone   Best Call Back Number:[phone]  Additional Information:

## 2019-07-31 NOTE — PROGRESS NOTES
Subjective:       Patient ID: Aston Avendano is a 79 y.o. male.    Chief Complaint: Wound Check (Neuropathic ulcer of toe of left, rates pain 0/10, wear sandals, non diabetic, PCP Dr. Frank)      HPI: Patient presents to the clinic today, for follow up and treatment concerning an ulceration to the distal plantar aspect left great toe. Patient's Primary Care Provider is Tim Frank MD. The PMHx. does include idiopathic peripheral neuropathy. Local woundcare product(s) most recently is/are collagen dressings. Patient presents ambulatory with sandals this morning.  Patient denies local or systemic signs of infection.  Patient denies drainage and/or malodor.  States resolution of digital edema/erythema.    Review of patient's allergies indicates:   Allergen Reactions    Codeine Itching     Other reaction(s): Itching    Oxycodone Itching     Pt states it makes his nose itch and he does not like it  Other reaction(s): Itching  Pt states it makes his nose itch and he does not like it  Pt states it makes his nose itch and he does not like it       Past Medical History:   Diagnosis Date    Arthritis     Cardiac syncope 2011    s/p pacemaker     Colon cancer screening 12/5/2016    Glaucoma     History of carotid artery stenosis 9/26/2013    No stenosis on scan done 11/2014. S/p CEA     Hypertension        Family History   Problem Relation Age of Onset    Hypertension Mother     Hypertension Father        Social History     Socioeconomic History    Marital status:      Spouse name: Not on file    Number of children: Not on file    Years of education: Not on file    Highest education level: Not on file   Occupational History    Not on file   Social Needs    Financial resource strain: Not on file    Food insecurity:     Worry: Not on file     Inability: Not on file    Transportation needs:     Medical: Not on file     Non-medical: Not on file   Tobacco Use    Smoking status: Former Smoker      Packs/day: 0.25     Years: 50.00     Pack years: 12.50     Last attempt to quit: 2006     Years since quittin.2    Smokeless tobacco: Never Used   Substance and Sexual Activity    Alcohol use: Yes     Comment: OCC    Drug use: No    Sexual activity: Not on file   Lifestyle    Physical activity:     Days per week: Not on file     Minutes per session: Not on file    Stress: Not on file   Relationships    Social connections:     Talks on phone: Not on file     Gets together: Not on file     Attends Worship service: Not on file     Active member of club or organization: Not on file     Attends meetings of clubs or organizations: Not on file     Relationship status: Not on file   Other Topics Concern    Not on file   Social History Narrative    Not on file       Past Surgical History:   Procedure Laterality Date    CARDIAC PACEMAKER PLACEMENT      CARDIAC PACEMAKER PLACEMENT      CAROTID ENDARTERECTOMY      CATARACT EXTRACTION W/  INTRAOCULAR LENS IMPLANT  OD 12    CATARACT EXTRACTION W/  INTRAOCULAR LENS IMPLANT  OS date unknown    COLONOSCOPY N/A 2016    [facility]    LUMBAR FUSION  2013    RHIZOTOMY W/ RADIOFREQUENCY ABLATION  2010       Review of Systems   Constitutional: Negative for chills, fatigue and fever.   HENT: Negative for hearing loss.    Eyes: Negative for photophobia and visual disturbance.   Respiratory: Negative for cough, chest tightness, shortness of breath and wheezing.    Cardiovascular: Negative for chest pain and palpitations.   Gastrointestinal: Negative for constipation, diarrhea, nausea and vomiting.   Endocrine: Negative for cold intolerance and heat intolerance.   Genitourinary: Negative for flank pain.   Musculoskeletal: Positive for arthralgias, back pain and gait problem. Negative for neck pain and neck stiffness.   Skin: Positive for wound. Negative for color change.   Neurological: Positive for numbness. Negative for  light-headedness and headaches.   Psychiatric/Behavioral: Negative for sleep disturbance.          Objective:   There were no vitals taken for this visit.      LOWER EXTREMITY PHYSICAL EXAMINATION  NEUROLOGY: Sensation to light touch is intact. Proprioception is intact, bilateral. Sensation to pin prick is reduced to absent. Vibratory sensation is diminished to the left and right lower extremity. Examination with 5.07 [place] Terri monofilament reveals that protective sensation is not intact to the left and right plantar surfaces of the foot and digits, as the patient has no sensation/detection at greater than 4 distinct points of contact    VASCULAR: There is no edema is noted to the bilateral lower extremity. On the left and right foot, the dorsalis pedis pulse is 1/4 and the posterior tibial pulse is 0/4. Capillary refill time is less than 3 seconds. Hair growth is sparse to absent on the dorsum of the foot and at the digits. No rubor is present. Proximal to distal temperature is warm to warm.     DERMATOLOGY:  Recalcitrant wound, the greatly improve, plantar aspect left great toe.  The wound measures 0.4 cm x 0.30 cm x 0.10 cm.  Granulation tissues are noted with moderate periwound hyperkeratosis.  No fluctuance or malodor is noted. No drainage.  No probe to bone or osseous exposure.    ORTHOPEDIC: Manual Muscle Testing is 5/5 in all planes on the left, without pains, with and without resistance.  Equinus contracture is noted. Semi rigid lesser digit  hammertoe contractures are appreciated.  Hallux malleus deformity is noted.     Assessment:     1. 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  Most recent  wound culture is reviewed in detail with the patient. All questions and concerns regarding findings and its/their implications are outlined and discussed.     The wound was surgically debrided after adequate prep with alcohol and/or betadine paint. Excisional wound debridement was performed using sharp #10/#15 blade/rounded scalpel and tissue nipper, with removal of all non-viable skin and soft tissues; necrotic skin/tissue formation. The woundbase/wound bed was also debrided to encourage bleeding as to promote/stimulate healing. Debridement was excisional and included epidermal, dermal and subcutaneous tissues. Post debridement measurements are as above. Hemostasis was achieved. Patient tolerated procedure well and without complications. Local woundcare with collagen dressings and bandage thereafter. Patient will change the collagen dressings Q3 - Q4 days in standard fashion.    Aggressive offloading.  Follow up in approximately 3 weeks.  If no improvement and/or resolution at that time, we will obtain CHELSEY/PVR evaluation.  XRAYS are reviewed in detail with the patient. All questions and concerns regarding findings and its/their implications are outlined and discussed.        Future Appointments   Date Time Provider Department Center   8/1/2019 10:45 AM Tereso Ewing MD [facility]   10/21/2019 10:00 AM PACEMAKER CLINIC, [facility]

## 2019-07-31 NOTE — TELEPHONE ENCOUNTER
Tried to call patient back message stated that call could not be completed. Tried 2 more time and same message received.

## 2019-07-31 NOTE — TELEPHONE ENCOUNTER
----- Message from Patricia Rodriguez sent at 7/31/2019  4:42 PM CDT -----  Contact: Pt   Type:  Patient Returning Call    Who Called:pt   Who Left Message for Patient:nurse for Dr   Does the patient know what this is regarding?:prescription   Would the patient rather a call back or a response via [facility]? Phone   Best Call Back Number:[phone]  Additional Information:

## 2019-08-01 ENCOUNTER — TELEPHONE (OUTPATIENT)
Dept: CARDIOLOGY | Facility: CLINIC | Age: 79
End: 2019-08-01

## 2019-08-01 NOTE — TELEPHONE ENCOUNTER
----- Message from Torri Ledesma sent at 8/1/2019  8:39 AM CDT -----  Contact: Patient   Please give the patient a call at  [phone]. He wanted to speak to someone in the pacemaker Dept.

## 2019-08-12 RX ORDER — GABAPENTIN 600 MG/1
600 TABLET ORAL 2 TIMES DAILY
Qty: 180 TABLET | Refills: 1 | Status: SHIPPED | OUTPATIENT
Start: 2019-08-12 | End: 2020-03-03

## 2019-08-21 ENCOUNTER — OFFICE VISIT (OUTPATIENT)
Dept: PODIATRY | Facility: CLINIC | Age: 79
End: 2019-08-21
Payer: MEDICARE

## 2019-08-21 ENCOUNTER — HOSPITAL ENCOUNTER (OUTPATIENT)
Dept: RADIOLOGY | Facility: HOSPITAL | Age: 79
Discharge: HOME | End: 2019-08-21
Attending: PODIATRIST
Payer: MEDICARE

## 2019-08-21 VITALS
DIASTOLIC BLOOD PRESSURE: 74 MMHG | HEART RATE: 50 BPM | SYSTOLIC BLOOD PRESSURE: 145 MMHG | WEIGHT: 187.81 LBS | HEIGHT: 70 IN | BODY MASS INDEX: 26.89 KG/M2

## 2019-08-21 DIAGNOSIS — G60.9 IDIOPATHIC PERIPHERAL NEUROPATHY: ICD-10-CM

## 2019-08-21 DIAGNOSIS — L97.522 NEUROPATHIC ULCER OF TOE OF LEFT FOOT WITH FAT LAYER EXPOSED: ICD-10-CM

## 2019-08-21 DIAGNOSIS — L97.522 NEUROPATHIC ULCER OF TOE OF LEFT FOOT WITH FAT LAYER EXPOSED: Primary | ICD-10-CM

## 2019-08-21 PROCEDURE — 73660 XR TOE 2 OR MORE VIEWS LEFT: ICD-10-PCS | Mod: 26,LT,, | Performed by: RADIOLOGY

## 2019-08-21 PROCEDURE — 11042 DBRDMT SUBQ TIS 1ST 20SQCM/<: CPT | Mod: PBBFAC | Performed by: PODIATRIST

## 2019-08-21 PROCEDURE — 87186 SC STD MICRODIL/AGAR DIL: CPT

## 2019-08-21 PROCEDURE — 99999 PR PBB SHADOW E&M-EST. PATIENT-LVL III: ICD-10-PCS | Mod: PBBFAC,,, | Performed by: PODIATRIST

## 2019-08-21 PROCEDURE — 87070 CULTURE OTHR SPECIMN AEROBIC: CPT

## 2019-08-21 PROCEDURE — 11042 DBRDMT SUBQ TIS 1ST 20SQCM/<: CPT | Mod: S$PBB,,, | Performed by: PODIATRIST

## 2019-08-21 PROCEDURE — 87077 CULTURE AEROBIC IDENTIFY: CPT

## 2019-08-21 PROCEDURE — 99213 OFFICE O/P EST LOW 20 MIN: CPT | Mod: PBBFAC,25 | Performed by: PODIATRIST

## 2019-08-21 PROCEDURE — 99214 OFFICE O/P EST MOD 30 MIN: CPT | Mod: 25,S$PBB,, | Performed by: PODIATRIST

## 2019-08-21 PROCEDURE — 11042 PR DEBRIDEMENT, SKIN, SUB-Q TISSUE,=<20 SQ CM: ICD-10-PCS | Mod: S$PBB,,, | Performed by: PODIATRIST

## 2019-08-21 PROCEDURE — 73660 X-RAY EXAM OF TOE(S): CPT | Mod: 26,LT,, | Performed by: RADIOLOGY

## 2019-08-21 PROCEDURE — 73660 X-RAY EXAM OF TOE(S): CPT | Mod: TC,LT

## 2019-08-21 PROCEDURE — 99999 PR PBB SHADOW E&M-EST. PATIENT-LVL III: CPT | Mod: PBBFAC,,, | Performed by: PODIATRIST

## 2019-08-21 PROCEDURE — 99214 PR OFFICE/OUTPT VISIT, EST, LEVL IV, 30-39 MIN: ICD-10-PCS | Mod: 25,S$PBB,, | Performed by: PODIATRIST

## 2019-08-21 RX ORDER — ACETAMINOPHEN 500 MG
2 TABLET ORAL
COMMUNITY
End: 2023-05-17

## 2019-08-21 RX ORDER — TRAZODONE HYDROCHLORIDE 50 MG/1
50 TABLET ORAL NIGHTLY
Refills: 1 | COMMUNITY
Start: 2019-08-07 | End: 2019-09-18 | Stop reason: SDUPTHER

## 2019-08-21 RX ORDER — MIRABEGRON 50 MG/1
TABLET, FILM COATED, EXTENDED RELEASE ORAL
COMMUNITY
Start: 2019-06-11 | End: 2022-10-25

## 2019-08-21 NOTE — PROGRESS NOTES
Subjective:       Patient ID: Aston Avendano is a 79 y.o. male.    Chief Complaint: Wound Care (ulcer of left foot-hallux; pt reports no pain at present. )      HPI: Patient presents to the clinic today, for follow up and treatment concerning an ulceration to the distal plantar aspect left great toe. Patient's Primary Care Provider is Tim Frank MD. The PMHx. does include idiopathic peripheral neuropathy. Local woundcare product(s) most recently is/are collagen.  Prior x-rays approximately 2 months ago were negative for pathology.  Patient denies local or systemic signs of infection.  States antalgic gait pattern due to substantial lower back arthropathy.  Patient is ambulatory with the assistance of a rolling walker.    No results found for: HGBA1C    Review of patient's allergies indicates:   Allergen Reactions    Codeine Itching     Other reaction(s): Itching    Oxycodone Itching     Pt states it makes his nose itch and he does not like it  Other reaction(s): Itching  Pt states it makes his nose itch and he does not like it  Pt states it makes his nose itch and he does not like it       Past Medical History:   Diagnosis Date    Arthritis     Cardiac syncope 2011    s/p pacemaker     Colon cancer screening 12/5/2016    Glaucoma     History of carotid artery stenosis 9/26/2013    No stenosis on scan done 11/2014. S/p CEA     Hypertension        Family History   Problem Relation Age of Onset    Hypertension Mother     Hypertension Father        Social History     Socioeconomic History    Marital status:      Spouse name: Not on file    Number of children: Not on file    Years of education: Not on file    Highest education level: Not on file   Occupational History    Not on file   Social Needs    Financial resource strain: Not on file    Food insecurity:     Worry: Not on file     Inability: Not on file    Transportation needs:     Medical: Not on file     Non-medical: Not on file    Tobacco Use    Smoking status: Former Smoker     Packs/day: 0.25     Years: 50.00     Pack years: 12.50     Last attempt to quit: 2006     Years since quittin.3    Smokeless tobacco: Never Used   Substance and Sexual Activity    Alcohol use: Yes     Comment: OCC    Drug use: No    Sexual activity: Not on file   Lifestyle    Physical activity:     Days per week: Not on file     Minutes per session: Not on file    Stress: Not on file   Relationships    Social connections:     Talks on phone: Not on file     Gets together: Not on file     Attends Church service: Not on file     Active member of club or organization: Not on file     Attends meetings of clubs or organizations: Not on file     Relationship status: Not on file   Other Topics Concern    Not on file   Social History Narrative    Not on file       Past Surgical History:   Procedure Laterality Date    CARDIAC PACEMAKER PLACEMENT      CARDIAC PACEMAKER PLACEMENT      CAROTID ENDARTERECTOMY      CATARACT EXTRACTION W/  INTRAOCULAR LENS IMPLANT  OD 12    CATARACT EXTRACTION W/  INTRAOCULAR LENS IMPLANT  OS date unknown    COLONOSCOPY N/A 2016    [facility]    LUMBAR FUSION  2013    RHIZOTOMY W/ RADIOFREQUENCY ABLATION  2010       Review of Systems   Constitutional: Negative for chills, fatigue and fever.   HENT: Negative for hearing loss.    Eyes: Negative for photophobia and visual disturbance.   Respiratory: Negative for cough, chest tightness, shortness of breath and wheezing.    Cardiovascular: Negative for chest pain and palpitations.   Gastrointestinal: Negative for constipation, diarrhea, nausea and vomiting.   Endocrine: Negative for cold intolerance and heat intolerance.   Genitourinary: Negative for flank pain.   Musculoskeletal: Positive for arthralgias and back pain. Negative for neck pain and neck stiffness.   Skin: Positive for wound. Negative for color change.   Neurological:  "Positive for numbness. Negative for light-headedness and headaches.   Psychiatric/Behavioral: Negative for sleep disturbance.          Objective:   BP (!) 145/74 (BP Location: Right arm, Patient Position: Sitting)   Pulse (!) 50   Ht 5' 10" (1.778 m)   Wt 85.2 kg (187 lb 13.3 oz)   BMI 26.95 kg/m²     Physical Exam  LOWER EXTREMITY PHYSICAL EXAMINATION  NEUROLOGY: Protective sensation is not intact to the left and right plantar surfaces of the foot and digits, as the patient has no sensation/detection at greater than 4 distinct points of contact with 5.07 [place] Terri monofilament. Sensation to light touch is intact on the left and right foot. Proprioception is intact, bilateral. Sensation to pin prick is reduced to absent. Vibratory sensation is diminished.    VASCULAR: There is no edema is noted to the bilateral lower extremity. On the left and right foot, the dorsalis pedis pulse is 1/4 and the posterior tibial pulse is 0/4. Capillary refill time is less than 3 seconds. Hair growth is sparse to absent on the dorsum of the foot and at the digits. Proximal to distal temperature is warm to warm.      ORTHOPEDIC: Manual Muscle Testing is 5/5 in all planes on the left, without pains, with and without resistance.  Equinus contracture is noted. Semi rigid lesser digit  hammertoe contractures are appreciated.  Hallux malleus deformity is noted.     DERMATOLOGY:  Persistent ulceration distal plantar aspect left great toe.  The wound measures 0.70 cm x 0.60 cm x 0.20 cm. Granulation tissues are noted. This wound is to the level of subcutaneous tissues with fat layer exposure.  No probe to bone or osseous exposure is noted. Slight malodor is appreciated.  Moderate periwound hyperkeratosis noted. Minimal periwound erythema is noted without calor or cellulitis.    Assessment:     1. 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 Idiopathic peripheral neuropathy        Plan:     Neuropathic ulcer of " toe of left foot with fat layer exposed  -     X-Ray Toe 2 or More Views Left; Future; Expected date: 08/21/2019  -     Ambulatory Referral to Wound Clinic    Idiopathic peripheral neuropathy  -     X-Ray Toe 2 or More Views Left; Future; Expected date: 08/21/2019  -     Ambulatory Referral to Wound Clinic        Chronic, nonhealing ulceration at the distal aspect left great toe.  Patient's complete approximately 1-2 course of oral antibiotic.  Wound culture is obtained this morning.  Please obtain radiograph as well. At this time, due to the aforementioned, patient will be referred to the [facility] for Advanced treatment/healing modalities.  Patient is amenable and is understanding.  For now, please limit weight-bearing ambulation. Please transition to SSD topical application b.i.d. until evaluation with the wound care center.  Patient does have a pending lower back operation for replacement of pain pump.  I do advised, that this procedure may likely not happen due to the foot wound at the present.        Future Appointments   Date Time Provider Department Center   8/21/2019  9:45 AM [facility] X[facility] [place]   10/23/2019  8:00 AM PAC[facility]AKER CLINIC, [facility]

## 2019-08-24 DIAGNOSIS — L97.522 NEUROPATHIC ULCER OF TOE OF LEFT FOOT WITH FAT LAYER EXPOSED: Primary | ICD-10-CM

## 2019-08-24 LAB — BACTERIA SPEC AEROBE CULT: ABNORMAL

## 2019-08-24 RX ORDER — CIPROFLOXACIN 750 MG/1
750 TABLET, FILM COATED ORAL 2 TIMES DAILY
Qty: 14 TABLET | Refills: 0 | Status: SHIPPED | OUTPATIENT
Start: 2019-08-24 | End: 2019-08-31

## 2019-09-19 RX ORDER — TRAZODONE HYDROCHLORIDE 50 MG/1
50 TABLET ORAL NIGHTLY
Qty: 30 TABLET | Refills: 1 | Status: SHIPPED | OUTPATIENT
Start: 2019-09-19 | End: 2022-10-25

## 2019-10-08 ENCOUNTER — PATIENT OUTREACH (OUTPATIENT)
Dept: ADMINISTRATIVE | Facility: HOSPITAL | Age: 79
End: 2019-10-08

## 2019-10-08 NOTE — PROGRESS NOTES
HTN OUTREACH: I spoke to pt that stated he has had a procedure and does not know when he will be able to ride in a car. Pt asked that I call him back in one month and he will schedule an appt to see Dr Frank. Pt verbalized understanding of needed appt.

## 2019-10-16 ENCOUNTER — TELEPHONE (OUTPATIENT)
Dept: FAMILY MEDICINE | Facility: CLINIC | Age: 79
End: 2019-10-16

## 2019-10-16 NOTE — TELEPHONE ENCOUNTER
----- Message from Ирина Camargo sent at 10/16/2019 10:04 AM CDT -----  Contact: Pt   Pt is calling .Type:  Patient Returning Call    Who Called: Pt   Who Left Message for Patient: Nurse   Does the patient know what this is regarding?: concerning upcoming appt    Would the patient rather a call back or a response via [facility]? Call back   Best Call Back Number: [phone]         .Thank You  Ирина Camargo

## 2019-10-21 ENCOUNTER — CLINICAL SUPPORT (OUTPATIENT)
Dept: CARDIOLOGY | Facility: CLINIC | Age: 79
End: 2019-10-21
Attending: INTERNAL MEDICINE
Payer: MEDICARE

## 2019-10-21 DIAGNOSIS — I49.5 SINOATRIAL NODE DYSFUNCTION: ICD-10-CM

## 2019-10-21 DIAGNOSIS — R55 SYNCOPE AND COLLAPSE: ICD-10-CM

## 2019-10-21 PROCEDURE — 93280 PM DEVICE PROGR EVAL DUAL: CPT | Mod: PBBFAC | Performed by: INTERNAL MEDICINE

## 2019-10-21 PROCEDURE — 93280 PACEMAKER PROGRAMMING: ICD-10-PCS | Mod: 26,S$PBB,, | Performed by: INTERNAL MEDICINE

## 2019-11-19 ENCOUNTER — PATIENT OUTREACH (OUTPATIENT)
Dept: ADMINISTRATIVE | Facility: HOSPITAL | Age: 79
End: 2019-11-19

## 2019-11-19 NOTE — PROGRESS NOTES
HTN REPORT: Contacted pt again as he asked me to and pt did schedule appt. Pt will also bring his blood pressure machine to compare so he can take future blood pressure reading from home.

## 2019-11-26 ENCOUNTER — OFFICE VISIT (OUTPATIENT)
Dept: FAMILY MEDICINE | Facility: CLINIC | Age: 79
End: 2019-11-26
Payer: MEDICARE

## 2019-11-26 VITALS
SYSTOLIC BLOOD PRESSURE: 136 MMHG | OXYGEN SATURATION: 99 % | TEMPERATURE: 96 F | BODY MASS INDEX: 27.46 KG/M2 | HEIGHT: 70 IN | DIASTOLIC BLOOD PRESSURE: 64 MMHG | HEART RATE: 50 BPM | WEIGHT: 191.81 LBS

## 2019-11-26 DIAGNOSIS — I65.23 BILATERAL CAROTID ARTERY STENOSIS: ICD-10-CM

## 2019-11-26 DIAGNOSIS — I10 ESSENTIAL HYPERTENSION: ICD-10-CM

## 2019-11-26 DIAGNOSIS — G89.4 CHRONIC PAIN SYNDROME: Primary | ICD-10-CM

## 2019-11-26 DIAGNOSIS — M86.9 OSTEOMYELITIS OF LEFT FOOT, UNSPECIFIED TYPE: ICD-10-CM

## 2019-11-26 DIAGNOSIS — G25.81 RESTLESS LEG SYNDROME: ICD-10-CM

## 2019-11-26 DIAGNOSIS — I48.91 ATRIAL FIBRILLATION, UNSPECIFIED TYPE: ICD-10-CM

## 2019-11-26 DIAGNOSIS — N40.0 BENIGN PROSTATIC HYPERPLASIA, UNSPECIFIED WHETHER LOWER URINARY TRACT SYMPTOMS PRESENT: ICD-10-CM

## 2019-11-26 DIAGNOSIS — K59.00 CONSTIPATION, UNSPECIFIED CONSTIPATION TYPE: ICD-10-CM

## 2019-11-26 PROCEDURE — 1159F PR MEDICATION LIST DOCUMENTED IN MEDICAL RECORD: ICD-10-PCS | Mod: ,,, | Performed by: FAMILY MEDICINE

## 2019-11-26 PROCEDURE — 99999 PR PBB SHADOW E&M-EST. PATIENT-LVL III: CPT | Mod: PBBFAC,,, | Performed by: FAMILY MEDICINE

## 2019-11-26 PROCEDURE — 1125F PR PAIN SEVERITY QUANTIFIED, PAIN PRESENT: ICD-10-PCS | Mod: ,,, | Performed by: FAMILY MEDICINE

## 2019-11-26 PROCEDURE — 1159F MED LIST DOCD IN RCRD: CPT | Mod: ,,, | Performed by: FAMILY MEDICINE

## 2019-11-26 PROCEDURE — 99214 OFFICE O/P EST MOD 30 MIN: CPT | Mod: S$PBB,,, | Performed by: FAMILY MEDICINE

## 2019-11-26 PROCEDURE — 99214 PR OFFICE/OUTPT VISIT, EST, LEVL IV, 30-39 MIN: ICD-10-PCS | Mod: S$PBB,,, | Performed by: FAMILY MEDICINE

## 2019-11-26 PROCEDURE — 99999 PR PBB SHADOW E&M-EST. PATIENT-LVL III: ICD-10-PCS | Mod: PBBFAC,,, | Performed by: FAMILY MEDICINE

## 2019-11-26 PROCEDURE — 99213 OFFICE O/P EST LOW 20 MIN: CPT | Mod: PBBFAC,PO | Performed by: FAMILY MEDICINE

## 2019-11-26 PROCEDURE — 1125F AMNT PAIN NOTED PAIN PRSNT: CPT | Mod: ,,, | Performed by: FAMILY MEDICINE

## 2019-11-26 RX ORDER — PRAMIPEXOLE DIHYDROCHLORIDE 0.25 MG/1
TABLET ORAL
Qty: 30 TABLET | Refills: 1 | Status: SHIPPED | OUTPATIENT
Start: 2019-11-26 | End: 2020-02-07 | Stop reason: SDUPTHER

## 2019-11-26 RX ORDER — DULOXETIN HYDROCHLORIDE 30 MG/1
30 CAPSULE, DELAYED RELEASE ORAL DAILY
Qty: 30 CAPSULE | Refills: 1 | Status: SHIPPED | OUTPATIENT
Start: 2019-11-26 | End: 2022-10-25

## 2019-11-26 NOTE — PROGRESS NOTES
Subjective:       Patient ID: Aston Avendano is a 79 y.o. male.    Chief Complaint: No chief complaint on file.      HPI Comments:       Current Outpatient Medications:     aspirin (ECOTRIN) 81 MG EC tablet, Take by mouth. 1 Tablet, Delayed Release (E.C.) Oral Every day, Disp: , Rfl:     calcium-vitamins B6-B12-FA Tab, Take by mouth., Disp: , Rfl:     chlorthalidone (HYGROTEN) 25 MG Tab, TAKE 1 TABLET ONE DAY AND 1/2 TABLET THE NEXT DAY AS DIRECTED, Disp: 90 tablet, Rfl: 3    DULoxetine (CYMBALTA) 30 MG capsule, Take 1 capsule (30 mg total) by mouth once daily., Disp: 30 capsule, Rfl: 1    finasteride (PROSCAR) 5 mg tablet, Take 5 mg by mouth once daily. , Disp: , Rfl:     fish oil-omega-3 fatty acids 300-1,000 mg capsule, Take 2 g by mouth once daily., Disp: , Rfl:     gabapentin (NEURONTIN) 600 MG tablet, Take 1 tablet (600 mg total) by mouth 2 (two) times daily., Disp: 180 tablet, Rfl: 1    ibuprofen (ADVIL,MOTRIN) 800 MG tablet, Take 800 mg by mouth every 8 (eight) hours., Disp: , Rfl: 1    losartan (COZAAR) 100 MG tablet, Take 1 tablet (100 mg total) by mouth once daily., Disp: 90 tablet, Rfl: 3    MYRBETRIQ 50 mg Tb24, , Disp: , Rfl:     omega 3-dha-epa-fish oil 300-1,000 mg Cap, Take 2 g by mouth., Disp: , Rfl:     peg 400-propylene glycol, PF, (SYSTANE ULTRA/LUBRICANT EYE, PF,) 0.4-0.3 % Dpet, Apply to eye., Disp: , Rfl:     pramipexole (MIRAPEX) 0.25 MG tablet, Take one tablet at night for restless legs, Disp: 30 tablet, Rfl: 1    ranitidine (ZANTAC) 300 MG tablet, Take 1 tablet (300 mg total) by mouth every evening. (Patient taking differently: Take 300 mg by mouth daily as needed. ), Disp: 90 tablet, Rfl: 3    simvastatin (ZOCOR) 20 MG tablet, Take 0.5 tablets (10 mg total) by mouth once daily., Disp: 45 tablet, Rfl: 4    tafluprost, PF, (ZIOPTAN, PF,) 0.0015 % Dpet, Place 1 drop into both eyes every evening., Disp: 30 each, Rfl: 6    traZODone (DESYREL) 50 MG tablet, Take 1 tablet (50 mg  "total) by mouth every evening., Disp: 30 tablet, Rfl: 1      Having a great deal of back pain.  A pain management physician has not been able to find anything that really helps.  Has a pain pump that is been repositioned.  Also using opioids.  Looking to see if there is anything else.  Already taking gabapentin which seems to have maxed out in its effectiveness.  Pain worse when he is riding in a car    Taking Mirapex which helps with his restless legs.  Also uses trazodone for sleep which has been very helpful.  Rarely has to take an Ambien    Constipation, GERD, heartburn all fairly stable.  Saw Cardiology without any changes in his meds.    Chronic toe infection has finally been gotten under control.  No current symptoms    Review of Systems   Constitutional: Negative for activity change, appetite change and fever.   HENT: Negative for sore throat.    Respiratory: Negative for cough and shortness of breath.    Cardiovascular: Negative for chest pain.   Gastrointestinal: Negative for abdominal pain, diarrhea and nausea.   Genitourinary: Negative for difficulty urinating.   Musculoskeletal: Positive for back pain. Negative for arthralgias and myalgias.   Neurological: Negative for dizziness and headaches.       Objective:      Vitals:    11/26/19 0801   BP: 136/64   Pulse: (!) 50   Temp: 96.4 °F (35.8 °C)   TempSrc: Tympanic   SpO2: 99%   Weight: 87 kg (191 lb 12.8 oz)   Height: 5' 10" (1.778 m)   PainSc:   4   PainLoc: Back     Physical Exam   Constitutional: He is oriented to person, place, and time. He appears well-developed and well-nourished. No distress.   HENT:   Head: Normocephalic.   Neck: Neck supple. No thyromegaly present.   Cardiovascular: Normal rate, regular rhythm and normal heart sounds.   No murmur heard.  Pulmonary/Chest: Effort normal and breath sounds normal. He has no wheezes. He has no rales.   Abdominal: Soft. He exhibits no distension.   Musculoskeletal: He exhibits no edema. "   Lymphadenopathy:     He has no cervical adenopathy.   Neurological: He is alert and oriented to person, place, and time.   Skin: Skin is warm and dry. He is not diaphoretic.   Psychiatric: He has a normal mood and affect. His behavior is normal. Judgment and thought content normal.   Nursing note and vitals reviewed.      Assessment:       1. Chronic pain syndrome    2. Osteomyelitis of left foot, unspecified type    3. Constipation, unspecified constipation type    4. Essential hypertension    5. Atrial fibrillation, unspecified type    6. Bilateral carotid artery stenosis    7. Benign prostatic hyperplasia, unspecified whether lower urinary tract symptoms present    8. Restless leg syndrome        Plan:   Chronic pain syndrome  Comments:  Trial of Cymbalta 30 mg daily.  Follow-up 1 month    Osteomyelitis of left foot, unspecified type  Comments:  Resolved    Constipation, unspecified constipation type  Comments:  Stable    Essential hypertension  Comments:  Controlled    Atrial fibrillation, unspecified type  Comments:  Controlled    Bilateral carotid artery stenosis  Comments:  Recent ultrasound by Dr. Kellogg.?  Results    Benign prostatic hyperplasia, unspecified whether lower urinary tract symptoms present  Comments:  Stable.  Recently saw urologist.  Started Cialis daily.  Continue Toviaz    Restless leg syndrome  Comments:  Continue Mirapex    Other orders  -     DULoxetine (CYMBALTA) 30 MG capsule; Take 1 capsule (30 mg total) by mouth once daily.  Dispense: 30 capsule; Refill: 1  -     pramipexole (MIRAPEX) 0.25 MG tablet; Take one tablet at night for restless legs  Dispense: 30 tablet; Refill: 1

## 2019-12-09 ENCOUNTER — TELEPHONE (OUTPATIENT)
Dept: FAMILY MEDICINE | Facility: CLINIC | Age: 79
End: 2019-12-09

## 2019-12-09 NOTE — TELEPHONE ENCOUNTER
----- Message from Shy Hunt sent at 12/9/2019  3:17 PM CST -----  Contact: pt  He's calling in regards to speak with nurse       ,pls call pt back at [phone] (home)

## 2019-12-10 RX ORDER — OMEPRAZOLE 20 MG/1
20 CAPSULE, DELAYED RELEASE ORAL DAILY
Qty: 90 CAPSULE | Refills: 1 | Status: SHIPPED | OUTPATIENT
Start: 2019-12-10 | End: 2020-07-28 | Stop reason: SDUPTHER

## 2019-12-10 NOTE — TELEPHONE ENCOUNTER
"Pt states he received a free sample in the mail of the requested medication and states "it works wonders"   "

## 2019-12-27 RX ORDER — TAMSULOSIN HYDROCHLORIDE 0.4 MG/1
1 CAPSULE ORAL 2 TIMES DAILY
COMMUNITY
Start: 2019-10-02 | End: 2023-03-15 | Stop reason: SDUPTHER

## 2019-12-27 RX ORDER — HYDROCODONE BITARTRATE AND ACETAMINOPHEN 5; 325 MG/1; MG/1
TABLET ORAL
COMMUNITY
Start: 2019-09-27 | End: 2022-10-25

## 2019-12-27 RX ORDER — CEPHRADINE 500 MG
1 CAPSULE ORAL DAILY
Status: ON HOLD | COMMUNITY
End: 2023-03-21 | Stop reason: HOSPADM

## 2019-12-27 RX ORDER — SILDENAFIL 100 MG/1
100 TABLET, FILM COATED ORAL
COMMUNITY
Start: 2019-09-17 | End: 2022-10-25

## 2019-12-27 RX ORDER — BACLOFEN 10 MG/1
TABLET ORAL
COMMUNITY
Start: 2019-10-04 | End: 2023-10-31

## 2019-12-27 RX ORDER — SIMVASTATIN 10 MG/1
10 TABLET, FILM COATED ORAL NIGHTLY
COMMUNITY
Start: 2019-09-24 | End: 2022-10-25

## 2019-12-27 RX ORDER — FESOTERODINE FUMARATE 4 MG/1
1 TABLET, FILM COATED, EXTENDED RELEASE ORAL DAILY
Refills: 11 | COMMUNITY
Start: 2019-11-22 | End: 2022-10-25

## 2020-01-28 ENCOUNTER — CLINICAL SUPPORT (OUTPATIENT)
Dept: CARDIOLOGY | Facility: CLINIC | Age: 80
End: 2020-01-28
Attending: INTERNAL MEDICINE
Payer: MEDICARE

## 2020-01-28 DIAGNOSIS — Z95.0 CARDIAC PACEMAKER IN SITU: ICD-10-CM

## 2020-01-28 DIAGNOSIS — I44.2 INTERMITTENT COMPLETE HEART BLOCK: ICD-10-CM

## 2020-01-28 DIAGNOSIS — I49.5 SINUS NODE DYSFUNCTION: ICD-10-CM

## 2020-01-28 PROCEDURE — 93296 REM INTERROG EVL PM/IDS: CPT | Mod: PBBFAC | Performed by: INTERNAL MEDICINE

## 2020-01-28 PROCEDURE — 93294 PACEMAKER REMOTE: ICD-10-PCS | Mod: ,,, | Performed by: INTERNAL MEDICINE

## 2020-01-28 PROCEDURE — 93294 REM INTERROG EVL PM/LDLS PM: CPT | Mod: ,,, | Performed by: INTERNAL MEDICINE

## 2020-02-07 ENCOUNTER — TELEPHONE (OUTPATIENT)
Dept: FAMILY MEDICINE | Facility: CLINIC | Age: 80
End: 2020-02-07

## 2020-02-07 RX ORDER — PRAMIPEXOLE DIHYDROCHLORIDE 0.5 MG/1
TABLET ORAL
Qty: 90 TABLET | Refills: 1 | Status: SHIPPED | OUTPATIENT
Start: 2020-02-07 | End: 2020-02-28 | Stop reason: SDUPTHER

## 2020-02-07 NOTE — TELEPHONE ENCOUNTER
Pt requesting medication change for restless leg syndrome. Pt states current medication is not effective.

## 2020-02-07 NOTE — TELEPHONE ENCOUNTER
----- Message from Chantal Tuttle sent at 2/7/2020  8:11 AM CST -----  ..Type:  Patient Returning Call    Who Called:pt   Who Left Message for Patient:  Does the patient know what this is regarding?: Possible medication change   Would the patient rather a call back or a response via [facility]? Call back   Best Call Back Number: [phone]  Additional Information: Pt is requesting a call from nurse to discuss a change in is medication for restless leg ( Pramipexole) . Pt states the medication isnt working for him.

## 2020-02-07 NOTE — TELEPHONE ENCOUNTER
Pt informed new script has been sent to pharmacy for 90 day supply and to take only one when new script is received. Pt verbalized understanding of information.

## 2020-02-28 RX ORDER — PRAMIPEXOLE DIHYDROCHLORIDE 0.5 MG/1
TABLET ORAL
Qty: 90 TABLET | Refills: 1 | Status: SHIPPED | OUTPATIENT
Start: 2020-02-28 | End: 2022-10-25

## 2020-02-28 NOTE — TELEPHONE ENCOUNTER
----- Message from Joan Osborn sent at 2/28/2020  8:48 AM CST -----  Contact: pt  Type:  RX Refill Request    Who Called: Patient  Refill or New Rx:Refill  RX Name and Strength:Prempexole 50mg  How is the patient currently taking it? (ex. 1XDay):1xday  Is this a 30 day or 90 day RX:90day  Preferred Pharmacy with phone number:[facility]  Local or Mail Order:local  Ordering Provider:Dr Frank  Would the patient rather a call back or a response via [facility]? Call back  Best Call Back Number:[phone]  Additional Information: second reqeust

## 2020-03-03 RX ORDER — GABAPENTIN 600 MG/1
TABLET ORAL
Qty: 180 TABLET | Refills: 4 | Status: ON HOLD | OUTPATIENT
Start: 2020-03-03 | End: 2023-03-21 | Stop reason: HOSPADM

## 2020-04-14 ENCOUNTER — TELEPHONE (OUTPATIENT)
Dept: FAMILY MEDICINE | Facility: CLINIC | Age: 80
End: 2020-04-14

## 2020-04-14 NOTE — TELEPHONE ENCOUNTER
----- Message from Tim Frank MD sent at 4/14/2020  8:40 AM CDT -----  Contact: [phone]  He should go to the emergency room  ----- Message -----  From: Jessica Obregon MA  Sent: 4/14/2020   8:30 AM CDT  To: Tim Frank MD    Spoke to pt and pt stated that this has been going on for 2-3 weeks now. He has been constipated and he took pepto this morning after throwing up.  ----- Message -----  From: Tim Frank MD  Sent: 4/14/2020   8:20 AM CDT  To: Jessica Obregon MA    Call him and get triage:  When did start.  How many times?  He taken anything for?  Any other symptoms?  Fever?  Cough or shortness of breath?  Any sick exposures?  Abdominal pain?  Diarrhea?  ----- Message -----  From: Jessica Obregon MA  Sent: 4/14/2020   8:16 AM CDT  To: Tim Frank MD    What should pt do about vomiting?  ----- Message -----  From: Star Tuttle  Sent: 4/14/2020   8:05 AM CDT  To: Monika Dumont Staff     Pt would like a call from nurse in regards to vomiting. Please call back at [phone].       Thank You,   Star Tuttle

## 2020-04-14 NOTE — TELEPHONE ENCOUNTER
Spoke to pt and told pt that dr parada said to go to the er but pt doesn't understand why he has to go bc they probably  wont be able to do much.

## 2020-04-16 ENCOUNTER — TELEPHONE (OUTPATIENT)
Dept: FAMILY MEDICINE | Facility: CLINIC | Age: 80
End: 2020-04-16

## 2020-04-16 NOTE — TELEPHONE ENCOUNTER
Spoke to Flores with home health, she stated that mr de la rosa has been not wanting to eat and vomiting. Said his vitals are stable and he just got a cath put in and she thinks that the pain he is also having is from the cath. She wants to know if you could call something in due pt vomiting. Her number is [phone] if you would like to speak to her about the pt.

## 2020-04-28 RX ORDER — CHLORTHALIDONE 25 MG/1
TABLET ORAL
Qty: 30 TABLET | Refills: 0 | Status: SHIPPED | OUTPATIENT
Start: 2020-04-28 | End: 2022-10-25

## 2020-05-05 ENCOUNTER — HOSPITAL ENCOUNTER (OUTPATIENT)
Dept: CARDIOLOGY | Facility: HOSPITAL | Age: 80
Discharge: HOME | End: 2020-05-05
Attending: INTERNAL MEDICINE
Payer: MEDICARE

## 2020-05-05 DIAGNOSIS — Z95.0 CARDIAC PACEMAKER IN SITU: ICD-10-CM

## 2020-05-05 DIAGNOSIS — I49.5 SINUS NODE DYSFUNCTION: ICD-10-CM

## 2020-05-05 DIAGNOSIS — I44.2 INTERMITTENT COMPLETE HEART BLOCK: ICD-10-CM

## 2020-05-05 PROCEDURE — 93296 REM INTERROG EVL PM/IDS: CPT

## 2020-05-05 PROCEDURE — 93294 CARDIAC DEVICE CHECK - REMOTE: ICD-10-PCS | Mod: ,,, | Performed by: INTERNAL MEDICINE

## 2020-05-05 PROCEDURE — 93294 REM INTERROG EVL PM/LDLS PM: CPT | Mod: ,,, | Performed by: INTERNAL MEDICINE

## 2020-05-11 LAB
AV DELAY - FIXED: 180 MSEC
BATTERY VOLTAGE (V): 2.83 V
IMPEDANCE RA LEAD (NATIVE): 390 OHMS
IMPEDANCE RA LEAD: 360 OHMS
OHS CV DC PP MS1: 0.4 MS
OHS CV DC PP MS2: 0.4 MS
OHS CV DC PP V1: NORMAL V
OHS CV DC PP V2: NORMAL V
P/R-WAVE RA LEAD (NATIVE): 12 MV
P/R-WAVE RA LEAD: 2.5 MV
THRESHOLD MS RA LEAD (NATIVE): 0.4 MS
THRESHOLD MS RA LEAD: 0.4 MS
THRESHOLD V RA LEAD (NATIVE): 0.75 V
THRESHOLD V RA LEAD: 0.5 V

## 2020-07-15 DIAGNOSIS — Z71.89 COMPLEX CARE COORDINATION: ICD-10-CM

## 2020-07-27 ENCOUNTER — OFFICE VISIT (OUTPATIENT)
Dept: FAMILY MEDICINE | Facility: CLINIC | Age: 80
End: 2020-07-27
Payer: MEDICARE

## 2020-07-27 VITALS
OXYGEN SATURATION: 96 % | HEIGHT: 70 IN | HEART RATE: 56 BPM | TEMPERATURE: 99 F | WEIGHT: 179.44 LBS | BODY MASS INDEX: 25.69 KG/M2 | SYSTOLIC BLOOD PRESSURE: 122 MMHG | DIASTOLIC BLOOD PRESSURE: 64 MMHG

## 2020-07-27 DIAGNOSIS — R26.89 BALANCE PROBLEM: ICD-10-CM

## 2020-07-27 DIAGNOSIS — Z01.818 PREOP EXAMINATION: Primary | ICD-10-CM

## 2020-07-27 DIAGNOSIS — I10 ESSENTIAL HYPERTENSION: ICD-10-CM

## 2020-07-27 DIAGNOSIS — I48.91 ATRIAL FIBRILLATION, UNSPECIFIED TYPE: ICD-10-CM

## 2020-07-27 DIAGNOSIS — G89.4 CHRONIC PAIN SYNDROME: ICD-10-CM

## 2020-07-27 DIAGNOSIS — G25.81 RESTLESS LEG SYNDROME: ICD-10-CM

## 2020-07-27 DIAGNOSIS — D69.6 THROMBOCYTOPENIA: ICD-10-CM

## 2020-07-27 PROCEDURE — 99999 PR PBB SHADOW E&M-EST. PATIENT-LVL III: CPT | Mod: PBBFAC,,, | Performed by: FAMILY MEDICINE

## 2020-07-27 PROCEDURE — 99213 OFFICE O/P EST LOW 20 MIN: CPT | Mod: PBBFAC,PO | Performed by: FAMILY MEDICINE

## 2020-07-27 PROCEDURE — 99214 PR OFFICE/OUTPT VISIT, EST, LEVL IV, 30-39 MIN: ICD-10-PCS | Mod: S$PBB,,, | Performed by: FAMILY MEDICINE

## 2020-07-27 PROCEDURE — 99999 PR PBB SHADOW E&M-EST. PATIENT-LVL III: ICD-10-PCS | Mod: PBBFAC,,, | Performed by: FAMILY MEDICINE

## 2020-07-27 PROCEDURE — 99214 OFFICE O/P EST MOD 30 MIN: CPT | Mod: S$PBB,,, | Performed by: FAMILY MEDICINE

## 2020-07-27 NOTE — PROGRESS NOTES
Subjective:       Patient ID: Aston Avendano is a 80 y.o. male.    Chief Complaint: Pre-op Exam      HPI Comments:       Current Outpatient Medications:     aspirin (ECOTRIN) 81 MG EC tablet, Take by mouth. 1 Tablet, Delayed Release (E.C.) Oral Every day, Disp: , Rfl:     baclofen (LIORESAL) 10 MG tablet, TAKE 1 TABLET BY MOUTH EVERY 8-12 HOURS AS NEEDED, Disp: , Rfl:     calcium-vitamins B6-B12-FA Tab, Take by mouth., Disp: , Rfl:     chlorthalidone (HYGROTEN) 25 MG Tab, TAKE 1 TABLET ONE DAY AND 1/2 TABLET THE NEXT DAY AS DIRECTED, Disp: 30 tablet, Rfl: 0    cholecalciferol, vitamin D3, (VITAMIN D3) 10,000 unit Cap, Take 1 tablet by mouth once daily., Disp: , Rfl:     DULoxetine (CYMBALTA) 30 MG capsule, Take 1 capsule (30 mg total) by mouth once daily., Disp: 30 capsule, Rfl: 1    fish oil-omega-3 fatty acids 300-1,000 mg capsule, Take 2 g by mouth once daily., Disp: , Rfl:     gabapentin (NEURONTIN) 600 MG tablet, TAKE 1 TABLET TWICE A DAY, Disp: 180 tablet, Rfl: 4    HYDROcodone-acetaminophen (NORCO) 5-325 mg per tablet, TAKE 1 TO 2 TABLETS BY MOUTH EVERY 4 TO 6 HOURS AS NEEDED FOR POST-OP PAIN, Disp: , Rfl:     ibuprofen (ADVIL,MOTRIN) 800 MG tablet, Take 800 mg by mouth every 8 (eight) hours., Disp: , Rfl: 1    losartan (COZAAR) 100 MG tablet, Take 1 tablet (100 mg total) by mouth once daily., Disp: 90 tablet, Rfl: 3    MYRBETRIQ 50 mg Tb24, , Disp: , Rfl:     omega 3-dha-epa-fish oil 300-1,000 mg Cap, Take 2 g by mouth., Disp: , Rfl:     omeprazole (PRILOSEC) 20 MG capsule, Take 1 capsule (20 mg total) by mouth once daily., Disp: 90 capsule, Rfl: 1    peg 400-propylene glycol, PF, (SYSTANE ULTRA/LUBRICANT EYE, PF,) 0.4-0.3 % Dpet, Apply to eye., Disp: , Rfl:     pramipexole (MIRAPEX) 0.5 MG tablet, Take one tablet at night for restless legs, Disp: 90 tablet, Rfl: 1    ranitidine (ZANTAC) 300 MG tablet, Take 1 tablet (300 mg total) by mouth every evening. (Patient taking differently: Take 300  "mg by mouth daily as needed. ), Disp: 90 tablet, Rfl: 3    riTUXimab (RITUXAN) 10 mg/mL injection, Rituxan 1000 mg IV X 2 doses, 2 weeks apart., Disp: , Rfl:     sildenafil (VIAGRA) 100 MG tablet, Take 100 mg by mouth., Disp: , Rfl:     simvastatin (ZOCOR) 10 MG tablet, Take 10 mg by mouth every evening., Disp: , Rfl:     tamsulosin (FLOMAX) 0.4 mg Cap, Take 1 capsule by mouth 2 (two) times daily., Disp: , Rfl:     TOVIAZ 4 mg Tb24, Take 1 tablet by mouth once daily., Disp: , Rfl: 11    traZODone (DESYREL) 50 MG tablet, Take 1 tablet (50 mg total) by mouth every evening., Disp: 30 tablet, Rfl: 1      Here for preoperative clearance.  Says he has already been cleared by his cardiologist, Dr. Kellogg.  Planned bilateral toe surgery.  Bilateral IP fusion and Cheelietomy" for big toe stabilization in the hope of creating  the increased balance.  Dr. Sarbjit Lyle.  [facility].  August 5, 2020    Past medical history of bilateral carotid artery stenosis, chronic anemia, restless leg syndrome, glaucoma, hyperlipidemia, hypertension, GERD BPH, glaucoma, cardiac pacemaker, peripheral vascular disease, history of syncope with bifascicular block, stable thrombocytopenia.  Recently his pacemaker was interrogated by Dr. Kellogg    Medications:  Omeprazole, aspirin, chlorthalidone, duloxetine, Proscar, gabapentin, losartan, Mirapex, Zocor, trazodone    Has lost some weight since our last visit.  Appetite is not good.  Had an event in April with vomiting that seemed prolonged.  I advised him at the time to go to emergency room but he declined and so did not get evaluated.    Has history of heartburn and was started on omeprazole recently.  Says it works wonders and he would like a refill.    The Cymbalta I tried him on at our last visit in 11/19 did not help..      Has seen a urologist over the last few months.  Urinary frequency.  Medication does not seem to be helping    Now on Linzess for " "constipation.    Review of Systems   Constitutional: Negative for activity change, appetite change and fever.   HENT: Negative for sore throat.    Respiratory: Negative for cough and shortness of breath.    Cardiovascular: Negative for chest pain.   Gastrointestinal: Positive for constipation. Negative for abdominal pain, diarrhea and nausea.   Genitourinary: Positive for frequency. Negative for difficulty urinating.   Musculoskeletal: Positive for back pain and gait problem. Negative for arthralgias and myalgias.   Neurological: Negative for dizziness and headaches.       Objective:      Vitals:    07/27/20 0759   BP: 122/64   Pulse: (!) 56   Temp: 99.2 °F (37.3 °C)   TempSrc: Tympanic   SpO2: 96%   Weight: 81.4 kg (179 lb 7.3 oz)   Height: 5' 10" (1.778 m)   PainSc:   7   PainLoc: Back     Physical Exam  Vitals signs and nursing note reviewed.   Constitutional:       General: He is not in acute distress.     Appearance: He is well-developed. He is not diaphoretic.   HENT:      Head: Normocephalic.      Mouth/Throat:      Pharynx: No oropharyngeal exudate.   Neck:      Musculoskeletal: Neck supple.      Thyroid: No thyromegaly.   Cardiovascular:      Rate and Rhythm: Normal rate and regular rhythm.      Heart sounds: Normal heart sounds. No murmur.   Pulmonary:      Effort: Pulmonary effort is normal.      Breath sounds: Normal breath sounds. No wheezing or rales.   Abdominal:      General: There is no distension.      Palpations: Abdomen is soft.   Lymphadenopathy:      Cervical: No cervical adenopathy.   Skin:     General: Skin is warm and dry.   Neurological:      Mental Status: He is alert and oriented to person, place, and time.   Psychiatric:         Behavior: Behavior normal.         Thought Content: Thought content normal.         Judgment: Judgment normal.         Assessment:       1. Preop examination    2. Balance problem    3. Essential hypertension    4. Chronic pain syndrome    5. Restless leg " syndrome    6. Atrial fibrillation, unspecified type    7. Thrombocytopenia        Plan:   Preop examination  Comments:  Surgery planned under local.  Patient is low risk from a medical standpoint.  CBC and BMP ordered     Balance problem  Comments:  Planned surgery on big toes to restore balance    Essential hypertension  Comments:  Controlled.     Chronic pain syndrome  Comments:  Says consistent back pain    Restless leg syndrome  Comments:  On medication    Atrial fibrillation, unspecified type  Comments:  Controlled.  On aspirin.  Patient cleared by Cardiology for procedure    Thrombocytopenia  Comments:  Stable

## 2020-07-28 ENCOUNTER — TELEPHONE (OUTPATIENT)
Dept: FAMILY MEDICINE | Facility: CLINIC | Age: 80
End: 2020-07-28

## 2020-07-28 ENCOUNTER — LAB VISIT (OUTPATIENT)
Dept: LAB | Facility: HOSPITAL | Age: 80
End: 2020-07-28
Attending: FAMILY MEDICINE
Payer: MEDICARE

## 2020-07-28 DIAGNOSIS — Z01.818 PREOP EXAMINATION: ICD-10-CM

## 2020-07-28 LAB
ANION GAP SERPL CALC-SCNC: 8 MMOL/L (ref 8–16)
BASOPHILS # BLD AUTO: 0.01 K/UL (ref 0–0.2)
BASOPHILS NFR BLD: 0.2 % (ref 0–1.9)
BUN SERPL-MCNC: 22 MG/DL (ref 8–23)
CALCIUM SERPL-MCNC: 9.5 MG/DL (ref 8.7–10.5)
CHLORIDE SERPL-SCNC: 102 MMOL/L (ref 95–110)
CO2 SERPL-SCNC: 29 MMOL/L (ref 23–29)
CREAT SERPL-MCNC: 1 MG/DL (ref 0.5–1.4)
DIFFERENTIAL METHOD: ABNORMAL
EOSINOPHIL # BLD AUTO: 0.1 K/UL (ref 0–0.5)
EOSINOPHIL NFR BLD: 1.1 % (ref 0–8)
ERYTHROCYTE [DISTWIDTH] IN BLOOD BY AUTOMATED COUNT: 12.2 % (ref 11.5–14.5)
EST. GFR  (AFRICAN AMERICAN): >60 ML/MIN/1.73 M^2
EST. GFR  (NON AFRICAN AMERICAN): >60 ML/MIN/1.73 M^2
GLUCOSE SERPL-MCNC: 87 MG/DL (ref 70–110)
HCT VFR BLD AUTO: 40.5 % (ref 40–54)
HGB BLD-MCNC: 13.5 G/DL (ref 14–18)
IMM GRANULOCYTES # BLD AUTO: 0.01 K/UL (ref 0–0.04)
IMM GRANULOCYTES NFR BLD AUTO: 0.2 % (ref 0–0.5)
LYMPHOCYTES # BLD AUTO: 1.6 K/UL (ref 1–4.8)
LYMPHOCYTES NFR BLD: 30 % (ref 18–48)
MCH RBC QN AUTO: 30.7 PG (ref 27–31)
MCHC RBC AUTO-ENTMCNC: 33.3 G/DL (ref 32–36)
MCV RBC AUTO: 92 FL (ref 82–98)
MONOCYTES # BLD AUTO: 0.4 K/UL (ref 0.3–1)
MONOCYTES NFR BLD: 7.3 % (ref 4–15)
NEUTROPHILS # BLD AUTO: 3.4 K/UL (ref 1.8–7.7)
NEUTROPHILS NFR BLD: 61.2 % (ref 38–73)
NRBC BLD-RTO: 0 /100 WBC
PLATELET # BLD AUTO: 146 K/UL (ref 150–350)
PMV BLD AUTO: 11.6 FL (ref 9.2–12.9)
POTASSIUM SERPL-SCNC: 3.6 MMOL/L (ref 3.5–5.1)
RBC # BLD AUTO: 4.4 M/UL (ref 4.6–6.2)
SODIUM SERPL-SCNC: 139 MMOL/L (ref 136–145)
WBC # BLD AUTO: 5.47 K/UL (ref 3.9–12.7)

## 2020-07-28 PROCEDURE — 80048 BASIC METABOLIC PNL TOTAL CA: CPT

## 2020-07-28 PROCEDURE — 36415 COLL VENOUS BLD VENIPUNCTURE: CPT | Mod: PO

## 2020-07-28 PROCEDURE — 85025 COMPLETE CBC W/AUTO DIFF WBC: CPT

## 2020-07-28 RX ORDER — OMEPRAZOLE 20 MG/1
20 CAPSULE, DELAYED RELEASE ORAL DAILY
Qty: 90 CAPSULE | Refills: 1 | Status: SHIPPED | OUTPATIENT
Start: 2020-07-28 | End: 2022-10-25

## 2020-07-28 NOTE — TELEPHONE ENCOUNTER
Spoke with patient he would like to go to [facility] in [place] to do his labs work due to transportation. Can you please reorder his blood work for [facility]. This is the [facility] lab now

## 2020-07-28 NOTE — TELEPHONE ENCOUNTER
----- Message from Kimberly Worley sent at 7/28/2020  8:15 AM CDT -----  Contact: Aston Clancy would like a call back at [phone], Regards to getting his lab orders sent the [facility] for his pre op clearance.    Thanks  Td

## 2020-08-19 DIAGNOSIS — I10 ESSENTIAL HYPERTENSION: ICD-10-CM

## 2020-08-19 RX ORDER — LOSARTAN POTASSIUM 100 MG/1
100 TABLET ORAL DAILY
Qty: 90 TABLET | Refills: 3 | Status: SHIPPED | OUTPATIENT
Start: 2020-08-19 | End: 2022-10-25

## 2020-09-15 DIAGNOSIS — Z95.0 CARDIAC PACEMAKER IN SITU: Primary | ICD-10-CM

## 2020-09-15 DIAGNOSIS — I49.5 SINUS NODE DYSFUNCTION: ICD-10-CM

## 2020-09-15 NOTE — PROGRESS NOTES
A user error has taken place: encounter opened in error, closed for administrative reasons.  Pt does not need device orders, now followed by YUNIOR Mar.

## 2021-04-08 ENCOUNTER — PES CALL (OUTPATIENT)
Dept: ADMINISTRATIVE | Facility: CLINIC | Age: 81
End: 2021-04-08

## 2021-06-22 ENCOUNTER — TELEPHONE (OUTPATIENT)
Dept: FAMILY MEDICINE | Facility: CLINIC | Age: 81
End: 2021-06-22

## 2021-06-30 ENCOUNTER — PATIENT OUTREACH (OUTPATIENT)
Dept: ADMINISTRATIVE | Facility: OTHER | Age: 81
End: 2021-06-30

## 2021-07-01 ENCOUNTER — HOSPITAL ENCOUNTER (OUTPATIENT)
Dept: RADIOLOGY | Facility: HOSPITAL | Age: 81
Discharge: HOME | End: 2021-07-01
Attending: INTERNAL MEDICINE
Payer: MEDICARE

## 2021-07-01 ENCOUNTER — OFFICE VISIT (OUTPATIENT)
Dept: GASTROENTEROLOGY | Facility: CLINIC | Age: 81
End: 2021-07-01
Payer: MEDICARE

## 2021-07-01 VITALS
WEIGHT: 171.31 LBS | HEART RATE: 50 BPM | DIASTOLIC BLOOD PRESSURE: 52 MMHG | BODY MASS INDEX: 24.52 KG/M2 | HEIGHT: 70 IN | SYSTOLIC BLOOD PRESSURE: 114 MMHG

## 2021-07-01 DIAGNOSIS — R63.0 ANOREXIA: ICD-10-CM

## 2021-07-01 DIAGNOSIS — R14.3 FLATULENCE: ICD-10-CM

## 2021-07-01 DIAGNOSIS — R63.4 WEIGHT LOSS: ICD-10-CM

## 2021-07-01 DIAGNOSIS — K59.03 DRUG-INDUCED CONSTIPATION: ICD-10-CM

## 2021-07-01 DIAGNOSIS — M54.9 CHRONIC BACK PAIN GREATER THAN 3 MONTHS DURATION: ICD-10-CM

## 2021-07-01 DIAGNOSIS — R14.0 BLOATING: ICD-10-CM

## 2021-07-01 DIAGNOSIS — K59.03 DRUG-INDUCED CONSTIPATION: Primary | ICD-10-CM

## 2021-07-01 DIAGNOSIS — G89.29 CHRONIC BACK PAIN GREATER THAN 3 MONTHS DURATION: ICD-10-CM

## 2021-07-01 PROCEDURE — 99999 PR PBB SHADOW E&M-EST. PATIENT-LVL V: ICD-10-PCS | Mod: PBBFAC,,, | Performed by: INTERNAL MEDICINE

## 2021-07-01 PROCEDURE — 99214 OFFICE O/P EST MOD 30 MIN: CPT | Mod: S$PBB,,, | Performed by: INTERNAL MEDICINE

## 2021-07-01 PROCEDURE — 99214 PR OFFICE/OUTPT VISIT, EST, LEVL IV, 30-39 MIN: ICD-10-PCS | Mod: S$PBB,,, | Performed by: INTERNAL MEDICINE

## 2021-07-01 PROCEDURE — 99999 PR PBB SHADOW E&M-EST. PATIENT-LVL V: CPT | Mod: PBBFAC,,, | Performed by: INTERNAL MEDICINE

## 2021-07-01 PROCEDURE — 99215 OFFICE O/P EST HI 40 MIN: CPT | Mod: PBBFAC | Performed by: INTERNAL MEDICINE

## 2021-07-01 PROCEDURE — 74019 RADEX ABDOMEN 2 VIEWS: CPT | Mod: TC

## 2021-07-01 PROCEDURE — 74019 XR ABDOMEN FLAT AND ERECT: ICD-10-PCS | Mod: 26,,, | Performed by: RADIOLOGY

## 2021-07-01 PROCEDURE — 74019 RADEX ABDOMEN 2 VIEWS: CPT | Mod: 26,,, | Performed by: RADIOLOGY

## 2021-07-01 RX ORDER — GABAPENTIN 600 MG/1
600 TABLET ORAL 2 TIMES DAILY PRN
COMMUNITY
Start: 2021-04-23 | End: 2022-10-25

## 2021-07-01 RX ORDER — TAMSULOSIN HYDROCHLORIDE 0.4 MG/1
0.4 CAPSULE ORAL
COMMUNITY
Start: 2020-12-28 | End: 2022-10-25

## 2021-07-01 RX ORDER — MOXIFLOXACIN 5 MG/ML
SOLUTION/ DROPS OPHTHALMIC
COMMUNITY
Start: 2021-01-04

## 2021-07-01 RX ORDER — BRIMONIDINE TARTRATE 1.5 MG/ML
1 SOLUTION/ DROPS OPHTHALMIC
COMMUNITY

## 2021-07-01 RX ORDER — BUPRENORPHINE 7.5 UG/H
1 PATCH TRANSDERMAL
COMMUNITY
Start: 2021-06-14 | End: 2022-10-25

## 2021-07-01 RX ORDER — FINASTERIDE 5 MG/1
5 TABLET, FILM COATED ORAL DAILY
COMMUNITY
Start: 2021-06-07 | End: 2023-03-15 | Stop reason: SDUPTHER

## 2021-07-01 RX ORDER — TIZANIDINE 4 MG/1
1 TABLET ORAL NIGHTLY
COMMUNITY
Start: 2020-10-08 | End: 2022-10-25

## 2021-07-12 ENCOUNTER — PATIENT MESSAGE (OUTPATIENT)
Dept: GASTROENTEROLOGY | Facility: CLINIC | Age: 81
End: 2021-07-12

## 2021-08-04 ENCOUNTER — TELEPHONE (OUTPATIENT)
Dept: ADMINISTRATIVE | Facility: HOSPITAL | Age: 81
End: 2021-08-04

## 2021-09-23 ENCOUNTER — TELEPHONE (OUTPATIENT)
Dept: ADMINISTRATIVE | Facility: HOSPITAL | Age: 81
End: 2021-09-23

## 2021-10-06 ENCOUNTER — TELEPHONE (OUTPATIENT)
Dept: FAMILY MEDICINE | Facility: CLINIC | Age: 81
End: 2021-10-06

## 2022-01-20 ENCOUNTER — TELEPHONE (OUTPATIENT)
Dept: ADMINISTRATIVE | Facility: HOSPITAL | Age: 82
End: 2022-01-20
Payer: COMMERCIAL

## 2022-10-25 PROBLEM — I50.32 HEART FAILURE, DIASTOLIC, CHRONIC: Status: ACTIVE | Noted: 2022-10-25

## 2022-11-01 ENCOUNTER — OFFICE VISIT (OUTPATIENT)
Dept: GASTROENTEROLOGY | Facility: CLINIC | Age: 82
End: 2022-11-01
Payer: MEDICARE

## 2022-11-01 VITALS
BODY MASS INDEX: 27.96 KG/M2 | HEIGHT: 70 IN | HEART RATE: 60 BPM | SYSTOLIC BLOOD PRESSURE: 150 MMHG | OXYGEN SATURATION: 95 % | WEIGHT: 195.31 LBS | DIASTOLIC BLOOD PRESSURE: 76 MMHG

## 2022-11-01 DIAGNOSIS — K59.03 CONSTIPATION DUE TO OPIOID THERAPY: ICD-10-CM

## 2022-11-01 DIAGNOSIS — K58.1 IRRITABLE BOWEL SYNDROME WITH CONSTIPATION: ICD-10-CM

## 2022-11-01 DIAGNOSIS — Z86.010 HISTORY OF ADENOMATOUS POLYP OF COLON: Primary | ICD-10-CM

## 2022-11-01 DIAGNOSIS — T40.2X5A CONSTIPATION DUE TO OPIOID THERAPY: ICD-10-CM

## 2022-11-01 PROCEDURE — 99999 PR PBB SHADOW E&M-EST. PATIENT-LVL IV: CPT | Mod: PBBFAC,,, | Performed by: INTERNAL MEDICINE

## 2022-11-01 PROCEDURE — 99999 PR PBB SHADOW E&M-EST. PATIENT-LVL IV: ICD-10-PCS | Mod: PBBFAC,,, | Performed by: INTERNAL MEDICINE

## 2022-11-01 PROCEDURE — 99213 OFFICE O/P EST LOW 20 MIN: CPT | Mod: S$PBB,,, | Performed by: INTERNAL MEDICINE

## 2022-11-01 PROCEDURE — 99214 OFFICE O/P EST MOD 30 MIN: CPT | Mod: PBBFAC | Performed by: INTERNAL MEDICINE

## 2022-11-01 PROCEDURE — 99213 PR OFFICE/OUTPT VISIT, EST, LEVL III, 20-29 MIN: ICD-10-PCS | Mod: S$PBB,,, | Performed by: INTERNAL MEDICINE

## 2022-11-01 NOTE — PROGRESS NOTES
Subjective:       Patient ID: Aston Avendano is a 82 y.o. male.    Chief Complaint: Colonoscopy (Pt. stated that, he was referred by his PCP to come for colonoscopy)    The patient is an 82-year-old gentleman was known to service from previous encounters.  He was last seen in our clinic in 2021 because of issues with constipation predominant IBS and opioid induced constipation. The patient has issues with back pain which has led to to need for narcotic analgesia.  He has been on multiple interventions with waxing and waning responses.  He is now on MiraLax.  He is to consider going to b.i.d. on his dosing.     His last visit with the gastroenterologists documented lower system was in March of 2020 when he was seen by Dr. Ayala at the [facility].  The details of that Note are reviewed with the patient's visit today addendum noted as well as outlined.    Reason for the patient's visit today is predominantly to discuss whether he should have further colonoscopies.  His last colonoscopy was in 2016 with only a single small 3 mm polyp observed and removed.  His last colon assessment before that time was in 2011, and no polyps were found. The  Reason for the 5 year repeat was because of some prep limitations.  The polyp removed during the 2016 study was an adenoma, however, has noted he was of minimal size.  The patient was 76-year-old at the time recommendation was made.  Although the patient is fairly active, he has a history of significant medical problems including carotid artery stenosis, coronary artery disease, chronic diastolic heart failure, wound issues with back pain. because the patient is over 80 years old and has had minimal findings on colonoscopy, his risk benefit ratio for doing this procedure and the likelihood of a positive impact vs risk being slim, it was mutually agreed that no further colon evaluations would be done.     Review of Systems   Constitutional: Negative.  Negative for  activity change, appetite change, chills, diaphoresis, fatigue, fever and unexpected weight change.   HENT:  Negative for congestion, ear discharge, facial swelling, hearing loss, nosebleeds, postnasal drip, sinus pressure, sneezing, tinnitus, trouble swallowing and voice change.    Eyes:  Negative for photophobia, redness and visual disturbance.   Respiratory:  Negative for cough, chest tightness, shortness of breath and wheezing.    Cardiovascular:  Negative for chest pain and palpitations.   Gastrointestinal:  Positive for constipation. Negative for abdominal distention, abdominal pain, blood in stool, diarrhea, nausea, rectal pain and vomiting.   Genitourinary:  Negative for difficulty urinating, dysuria, flank pain, frequency, hematuria, penile discharge, scrotal swelling, testicular pain and urgency.   Musculoskeletal:  Positive for back pain and gait problem. Negative for arthralgias, joint swelling, myalgias and neck stiffness.        Leg weakness  Joint stiffness   Skin:  Negative for color change, pallor, rash and wound.   Neurological:  Negative for dizziness, tremors, seizures, syncope, facial asymmetry, speech difficulty, weakness, light-headedness, numbness and headaches.   Hematological:  Negative for adenopathy. Does not bruise/bleed easily.   Psychiatric/Behavioral:  Negative for agitation, confusion, hallucinations, sleep disturbance and suicidal ideas.      Objective:      Physical Exam  Vitals reviewed.       Assessment:   History of adenomatous polyp of colon    Irritable bowel syndrome with constipation    Constipation due to opioid therapy        Plan:   As above.

## 2022-11-07 ENCOUNTER — TELEPHONE (OUTPATIENT)
Dept: GASTROENTEROLOGY | Facility: CLINIC | Age: 82
End: 2022-11-07
Payer: MEDICARE

## 2022-11-07 NOTE — TELEPHONE ENCOUNTER
Naomy Doc, this pt. Stated that, he is still constipated, and nothing at-all is helping, wants you to give him a call

## 2022-11-07 NOTE — TELEPHONE ENCOUNTER
----- Message from Holger Oakes sent at 11/7/2022  3:26 PM CST -----  Contact: patient  Aston Avendano would like a call back at [phone], in regards to him still being constipated. Pt states the Miralax is not helping.

## 2022-11-09 ENCOUNTER — TELEPHONE (OUTPATIENT)
Dept: GASTROENTEROLOGY | Facility: CLINIC | Age: 82
End: 2022-11-09
Payer: MEDICARE

## 2022-11-09 DIAGNOSIS — K59.03 DRUG-INDUCED CONSTIPATION: ICD-10-CM

## 2022-11-09 DIAGNOSIS — K59.09 CHRONIC CONSTIPATION: Primary | ICD-10-CM

## 2022-11-09 RX ORDER — SODIUM, POTASSIUM,MAG SULFATES 17.5-3.13G
1 SOLUTION, RECONSTITUTED, ORAL ORAL DAILY
Qty: 1 KIT | Refills: 0 | Status: SHIPPED | OUTPATIENT
Start: 2022-11-09 | End: 2022-11-11

## 2022-11-09 NOTE — TELEPHONE ENCOUNTER
----- Message from Fernandez Soto sent at 11/9/2022 11:38 AM CST -----  Contact: Aston  Type:  Patient Returning Call    Who Called:Aston  Who Left Message for Patient:Ana Cristina   Does the patient know what this is regarding?:not sure  Would the patient rather a call back or a response via [facility]? Call back   Best Call Back Number:[phone]  Additional Information:     Thanks

## 2022-11-09 NOTE — TELEPHONE ENCOUNTER
Hey Doc, the pt. called aging today about his constipation, no relieve yet he said, he sent a message on 11/07/22.

## 2022-12-08 ENCOUNTER — OFFICE VISIT (OUTPATIENT)
Dept: UROLOGY | Facility: CLINIC | Age: 82
End: 2022-12-08
Payer: MEDICARE

## 2022-12-08 VITALS
WEIGHT: 195 LBS | DIASTOLIC BLOOD PRESSURE: 78 MMHG | RESPIRATION RATE: 18 BRPM | BODY MASS INDEX: 27.92 KG/M2 | SYSTOLIC BLOOD PRESSURE: 165 MMHG | HEART RATE: 60 BPM | TEMPERATURE: 97 F | HEIGHT: 70 IN

## 2022-12-08 DIAGNOSIS — N32.81 OAB (OVERACTIVE BLADDER): Primary | ICD-10-CM

## 2022-12-08 DIAGNOSIS — N40.0 BENIGN PROSTATIC HYPERPLASIA WITHOUT LOWER URINARY TRACT SYMPTOMS: Chronic | ICD-10-CM

## 2022-12-08 PROCEDURE — 99999 PR PBB SHADOW E&M-EST. PATIENT-LVL IV: ICD-10-PCS | Mod: PBBFAC,,, | Performed by: NURSE PRACTITIONER

## 2022-12-08 PROCEDURE — 99204 OFFICE O/P NEW MOD 45 MIN: CPT | Mod: S$PBB,,, | Performed by: NURSE PRACTITIONER

## 2022-12-08 PROCEDURE — 99204 PR OFFICE/OUTPT VISIT, NEW, LEVL IV, 45-59 MIN: ICD-10-PCS | Mod: S$PBB,,, | Performed by: NURSE PRACTITIONER

## 2022-12-08 PROCEDURE — 99214 OFFICE O/P EST MOD 30 MIN: CPT | Mod: PBBFAC | Performed by: NURSE PRACTITIONER

## 2022-12-08 PROCEDURE — 99999 PR PBB SHADOW E&M-EST. PATIENT-LVL IV: CPT | Mod: PBBFAC,,, | Performed by: NURSE PRACTITIONER

## 2022-12-08 RX ORDER — TOLTERODINE 4 MG/1
4 CAPSULE, EXTENDED RELEASE ORAL DAILY
Qty: 90 CAPSULE | Refills: 0 | Status: SHIPPED | OUTPATIENT
Start: 2022-12-08 | End: 2023-01-10

## 2022-12-08 NOTE — PROGRESS NOTES
Chief Complaint:   Nocturia and daytime frequency    HPI:   Patient is an 82-year-old male that is presenting with an increase in nocturia, up to 4 times nightly and daytime frequency.  Patient states that the daytime frequency and urge incontinence is to the point where he can not leave his house without fear of incontinence.  Patient is currently on tamsulosin, finasteride and Detrol.  Denies gross hematuria.  Urine in clinic was negative in PVR was 45 mL.  States that he has taken Myrbetriq, in the past, with no resolved to symptoms.    Allergies:  Codeine and Oxycodone    Medications:  has a current medication list which includes the following prescription(s): aspirin, baclofen, brimonidine 0.15 % opth drop, calcium-vitamins b6-b12-fa, cholecalciferol (vitamin d3), diosmin complex no.1, finasteride, gabapentin, ibuprofen, sennosides, moxifloxacin, omega 3-dha-epa-fish oil, peg 400-propylene glycol (pf), simvastatin, tamsulosin, and tolterodine.    Review of Systems:  General: No fever, chills, fatigability, or weight loss.  Skin: No rashes, itching, or changes in color or texture of skin.  Chest: Denies ABEBE, cyanosis, wheezing, cough, and sputum production.  Abdomen: Appetite fine. No weight loss. Denies diarrhea, abdominal pain, hematemesis, or blood in stool.  Musculoskeletal: No joint stiffness or swelling. Denies back pain.  : As above.  All other review of systems negative.    PMH:   has a past medical history of Arthritis, Cardiac syncope (2011), Colon cancer screening (12/5/2016), Glaucoma, History of carotid artery stenosis (9/26/2013), and Hypertension.    PSH:   has a past surgical history that includes Cataract extraction w/  intraocular lens implant (OD 1/5/12); Cataract extraction w/  intraocular lens implant (OS date unknown); Cardiac pacemaker placement; Lumbar fusion (2013); Carotid endarterectomy; Rhizotomy w/ radiofrequency ablation (2010); Cardiac pacemaker placement; and Colonoscopy (N/A,  12/5/2016).    FamHx: family history includes Hypertension in his father and mother.    SocHx:  reports that he quit smoking about 16 years ago. He has a 12.50 pack-year smoking history. He has never used smokeless tobacco. He reports current alcohol use. He reports that he does not use drugs.      Physical Exam:  Vitals:    12/08/22 0808   BP: (!) 165/78   Pulse: 60   Resp: 18   Temp: 97.3 °F (36.3 °C)     General: A&Ox3, no apparent distress, no deformities  Neck: No masses, normal thyroid  Lungs: normal inspiration, no use of accessory muscles  Heart: normal pulse, no arrhythmias  Abdomen: Soft, NT, ND, no masses, no hernias, no hepatosplenomegaly  Lymphatic: Neck and groin nodes negative    Labs/Studies:   See HPI    Impression/Plan:   Overactive bladder  Patient has tried and failed to oral medications.  It is appropriate to proceed with possible Botox or InterStim.  Patient was scheduled with MD to discuss procedures.

## 2022-12-14 ENCOUNTER — OFFICE VISIT (OUTPATIENT)
Dept: GASTROENTEROLOGY | Facility: CLINIC | Age: 82
End: 2022-12-14
Payer: MEDICARE

## 2022-12-14 VITALS
DIASTOLIC BLOOD PRESSURE: 64 MMHG | BODY MASS INDEX: 26.2 KG/M2 | HEIGHT: 70 IN | WEIGHT: 183 LBS | SYSTOLIC BLOOD PRESSURE: 130 MMHG | OXYGEN SATURATION: 98 % | HEART RATE: 62 BPM

## 2022-12-14 DIAGNOSIS — G60.9 IDIOPATHIC PERIPHERAL NEUROPATHY: ICD-10-CM

## 2022-12-14 DIAGNOSIS — K59.03 DRUG-INDUCED CONSTIPATION: ICD-10-CM

## 2022-12-14 DIAGNOSIS — K59.09 CHRONIC CONSTIPATION: Primary | ICD-10-CM

## 2022-12-14 PROCEDURE — 99213 PR OFFICE/OUTPT VISIT, EST, LEVL III, 20-29 MIN: ICD-10-PCS | Mod: S$PBB,,, | Performed by: INTERNAL MEDICINE

## 2022-12-14 PROCEDURE — 99213 OFFICE O/P EST LOW 20 MIN: CPT | Mod: S$PBB,,, | Performed by: INTERNAL MEDICINE

## 2022-12-14 PROCEDURE — 99999 PR PBB SHADOW E&M-EST. PATIENT-LVL IV: ICD-10-PCS | Mod: PBBFAC,,, | Performed by: INTERNAL MEDICINE

## 2022-12-14 PROCEDURE — 99999 PR PBB SHADOW E&M-EST. PATIENT-LVL IV: CPT | Mod: PBBFAC,,, | Performed by: INTERNAL MEDICINE

## 2022-12-14 PROCEDURE — 99214 OFFICE O/P EST MOD 30 MIN: CPT | Mod: PBBFAC | Performed by: INTERNAL MEDICINE

## 2022-12-14 RX ORDER — SODIUM, POTASSIUM,MAG SULFATES 17.5-3.13G
1 SOLUTION, RECONSTITUTED, ORAL ORAL DAILY
Qty: 1 KIT | Refills: 0 | Status: SHIPPED | OUTPATIENT
Start: 2022-12-14 | End: 2022-12-16

## 2022-12-14 NOTE — PROGRESS NOTES
Subjective:       Patient ID: Aston Avendano is a 82 y.o. male.    Chief Complaint: Constipation    This gentleman is known to our service from previous encounters.  Because of issues with his back he has required narcotic analgesia which is no longer given orally.  Also suffers from chronic constipation which is exacerbated by his therapy.  Has previously been on Movantik but states this was not of benefit.  His most recent therapy has been with b.i.d. MiraLax.  He has not required use of Ex-Lax and last month he was given a colonoscopy prep which gave benefit but he did not continues to have benefit from MiraLax.    The patient has previously use Amitiza, and though it was not shown where he had use Linzess there is a note that says he has tried it in the past but developed excessive gas.  We will attempt to do new trial with Linzess after repeat of colon purge.        Review of Systems   Gastrointestinal:  Positive for constipation.   Musculoskeletal:  Positive for back pain and gait problem.        Leg weakness  Joint stiffness     Objective:      Physical Exam  Vitals reviewed.       Assessment:   Chronic constipation  -     linaCLOtide (LINZESS) 145 mcg Cap capsule; Take 1 capsule (145 mcg total) by mouth before breakfast.  Dispense: 30 capsule; Refill: 5  -     sodium,potassium,mag sulfates (SUPREP BOWEL PREP KIT) 17.5-3.13-1.6 gram SolR; Take 177 mLs by mouth once daily. for 2 days  Dispense: 1 kit; Refill: 0    Drug-induced constipation  -     linaCLOtide (LINZESS) 145 mcg Cap capsule; Take 1 capsule (145 mcg total) by mouth before breakfast.  Dispense: 30 capsule; Refill: 5  -     sodium,potassium,mag sulfates (SUPREP BOWEL PREP KIT) 17.5-3.13-1.6 gram SolR; Take 177 mLs by mouth once daily. for 2 days  Dispense: 1 kit; Refill: 0    Idiopathic peripheral neuropathy          Plan:   As above.  May titrate to higher dose of Linzess as discussed.

## 2022-12-15 ENCOUNTER — TELEPHONE (OUTPATIENT)
Dept: GASTROENTEROLOGY | Facility: CLINIC | Age: 82
End: 2022-12-15
Payer: MEDICARE

## 2022-12-15 NOTE — TELEPHONE ENCOUNTER
----- Message from Acacia Mckeon sent at 12/15/2022  1:23 PM CST -----  Contact: [phone]  Patient has some medical questions and wants to speak to a nurse. Please call him back at [phone]. Thanks/KJ

## 2022-12-15 NOTE — TELEPHONE ENCOUNTER
Returned pts call.  Pt states he took part of the suprep purge yesterday and worked well. But when he went to get the Linzess it was over $200.   He said he already takes miralax twice a day and it does not work.     He wants to know what else he can use in place of the Linzess?

## 2022-12-16 DIAGNOSIS — K59.03 DRUG-INDUCED CONSTIPATION: ICD-10-CM

## 2022-12-16 DIAGNOSIS — K59.09 CHRONIC CONSTIPATION: Primary | ICD-10-CM

## 2022-12-16 RX ORDER — LACTULOSE 10 G/15ML
30 SOLUTION ORAL 2 TIMES DAILY
Qty: 2700 ML | Refills: 3 | Status: SHIPPED | OUTPATIENT
Start: 2022-12-16 | End: 2023-01-15

## 2022-12-19 ENCOUNTER — PATIENT MESSAGE (OUTPATIENT)
Dept: GASTROENTEROLOGY | Facility: CLINIC | Age: 82
End: 2022-12-19
Payer: MEDICARE

## 2022-12-19 ENCOUNTER — TELEPHONE (OUTPATIENT)
Dept: GASTROENTEROLOGY | Facility: CLINIC | Age: 82
End: 2022-12-19
Payer: MEDICARE

## 2022-12-19 NOTE — TELEPHONE ENCOUNTER
----- Message from Sheldon Stoery sent at 12/19/2022 10:01 AM CST -----  Contact: rj Clancy is requesting a call back from Nurse Mcclure. Please call him back at [phone] or [phone]      Thanks  CF

## 2022-12-19 NOTE — TELEPHONE ENCOUNTER
Returned pts call. He states he attempted to drink the second part of suprep for the cleanout and he vomited it back up as soon as he finished drinking it.   Advised him Dr Melo sent in a new RX for lactulose to try.   Recommended to let us know if he can get it and how he does with taking it. Reminded him again this is Dr Melo's full working week then he will retire. He verbalized understanding and agreed to plan.

## 2022-12-30 ENCOUNTER — OFFICE VISIT (OUTPATIENT)
Dept: UROLOGY | Facility: CLINIC | Age: 82
End: 2022-12-30
Payer: MEDICARE

## 2022-12-30 VITALS
SYSTOLIC BLOOD PRESSURE: 140 MMHG | WEIGHT: 195 LBS | DIASTOLIC BLOOD PRESSURE: 60 MMHG | HEART RATE: 59 BPM | BODY MASS INDEX: 27.98 KG/M2

## 2022-12-30 DIAGNOSIS — N32.81 OAB (OVERACTIVE BLADDER): ICD-10-CM

## 2022-12-30 DIAGNOSIS — N40.0 BENIGN PROSTATIC HYPERPLASIA WITHOUT LOWER URINARY TRACT SYMPTOMS: Primary | ICD-10-CM

## 2022-12-30 PROCEDURE — 99214 OFFICE O/P EST MOD 30 MIN: CPT | Mod: S$PBB,,, | Performed by: UROLOGY

## 2022-12-30 PROCEDURE — 99999 PR PBB SHADOW E&M-EST. PATIENT-LVL IV: CPT | Mod: PBBFAC,,, | Performed by: UROLOGY

## 2022-12-30 PROCEDURE — 99214 PR OFFICE/OUTPT VISIT, EST, LEVL IV, 30-39 MIN: ICD-10-PCS | Mod: S$PBB,,, | Performed by: UROLOGY

## 2022-12-30 PROCEDURE — 99999 PR PBB SHADOW E&M-EST. PATIENT-LVL IV: ICD-10-PCS | Mod: PBBFAC,,, | Performed by: UROLOGY

## 2022-12-30 PROCEDURE — 99214 OFFICE O/P EST MOD 30 MIN: CPT | Mod: PBBFAC | Performed by: UROLOGY

## 2022-12-30 RX ORDER — SOLIFENACIN SUCCINATE 10 MG/1
10 TABLET, FILM COATED ORAL DAILY
Qty: 30 TABLET | Refills: 11 | Status: SHIPPED | OUTPATIENT
Start: 2022-12-30 | End: 2023-01-10

## 2022-12-30 NOTE — PROGRESS NOTES
Chief Complaint:   Encounter Diagnoses   Name Primary?    Benign prostatic hyperplasia without lower urinary tract symptoms Yes    OAB (overactive bladder)          HPI:   12/30/22- patient comes in today from [place] to discuss surgical options.  States that he has definite increased frequency and urgency with occasional urge incontinence, despite being on tamsulosin, finasteride and Detrol 4 mg.  No evidence of dysuria, no gross hematuria, does have a history of smoking.  He also states that he has a weak stream.      12/8/22- LR- Patient is an 82-year-old male that is presenting with an increase in nocturia, up to 4 times nightly and daytime frequency.  Patient states that the daytime frequency and urge incontinence is to the point where he can not leave his house without fear of incontinence.  Patient is currently on tamsulosin, finasteride and Detrol.  Denies gross hematuria.  Urine in clinic was negative in PVR was 45 mL.  States that he has taken Myrbetriq, in the past, with no resolved to symptoms.    Allergies:  Codeine and Oxycodone    Medications:  has a current medication list which includes the following prescription(s): aspirin, baclofen, brimonidine 0.15 % opth drop, calcium-vitamins b6-b12-fa, cholecalciferol (vitamin d3), diosmin complex no.1, finasteride, gabapentin, hydrocodone-acetaminophen, ibuprofen, lactulose, sennosides, losartan, moxifloxacin, omega 3-dha-epa-fish oil, peg 400-propylene glycol (pf), simvastatin, tamsulosin, tizanidine, and tolterodine.    Review of Systems:  General: No fever, chills, fatigability, or weight loss.  Skin: No rashes, itching, or changes in color or texture of skin.  Chest: Denies ABEBE, cyanosis, wheezing, cough, and sputum production.  Abdomen: Appetite fine. No weight loss. Denies diarrhea, abdominal pain, hematemesis, or blood in stool.  Musculoskeletal: No joint stiffness or swelling. Denies back pain.  : As above.  All other review of systems  negative.    PMH:   has a past medical history of Arthritis, Cardiac syncope (2011), Colon cancer screening (12/5/2016), Glaucoma, History of carotid artery stenosis (9/26/2013), and Hypertension.    PSH:   has a past surgical history that includes Cataract extraction w/  intraocular lens implant (OD 1/5/12); Cataract extraction w/  intraocular lens implant (OS date unknown); Cardiac pacemaker placement; Lumbar fusion (2013); Carotid endarterectomy; Rhizotomy w/ radiofrequency ablation (2010); Cardiac pacemaker placement; and Colonoscopy (N/A, 12/5/2016).    FamHx: family history includes Hypertension in his father and mother.    SocHx:  reports that he quit smoking about 16 years ago. He has a 12.50 pack-year smoking history. He has never used smokeless tobacco. He reports current alcohol use. He reports that he does not use drugs.      Physical Exam:  There were no vitals filed for this visit.  General: A&Ox3, no apparent distress, no deformities  Neck: No masses, normal ROM  Lungs: normal inspiration, no use of accessory muscles  Heart: normal pulse, no arrhythmias  Abdomen: Soft, NT, ND, no masses, no hernias, no hepatosplenomegaly  Skin: The skin is warm and dry. No jaundice.  Ext: No c/c/e.    Labs/Studies:   Pvr 45 ml 12/22    Impression/Plan:     1. BPH- tamsulosin and finasteride do not appear to be completely controlling his symptoms as he still states that he has a weak stream with urgency.  Please see below.    2. Urge incontinence- currently on detrol 4 mg and has failed myrbetriq in the past.  He does believe that the Detrol works better.  This is obviously his biggest complaint, although I am concerned this could be secondary to BPH.  Therefore will hold Detrol and attempt VESIcare 10 mg instead.  Call with any complaints.  I will have him return in approximately 2 weeks for cystoscopy, uroflow and postvoid residual.  Depending upon these findings will either pursue GreenLight versus Botox, if  symptoms are still persistent.

## 2023-01-04 ENCOUNTER — TELEPHONE (OUTPATIENT)
Dept: UROLOGY | Facility: CLINIC | Age: 83
End: 2023-01-04
Payer: MEDICARE

## 2023-01-04 NOTE — TELEPHONE ENCOUNTER
Tried to call pt there was no answer. Appt was rescheduled for next available date. Pt notified on [employer]       ----- Message from Lev Lopez sent at 1/4/2023  8:24 AM CST -----  Pt is calling to get his procedure R/s  he is currently scheduled for 13th .      Thank you    Pls call him back

## 2023-01-04 NOTE — TELEPHONE ENCOUNTER
Called the patient, after verification of name and , the patient was informed that I was returning his call per his request for rescheduling his cysto appointment. Informed patient that I tried calling him just now but his phone went straight to a message that said no VM set up. PT stated that he doesn't know what happened with that. Apologized for the miscommunication and informed him that  that we rescheduled his appointment for the next available time and date. He agreed to the time and date and stated he shouldn't have anything to do around that time but will let us know if something came up. Pt also asked if there ws something that came available sooner to let him know and he will see if he can make it. He said he just didn't want to call us last minute to reschedule his procedure. I told him it was ok and it was no problem at all. He thanked me for rescheduling and I reminded him of appointment time and date again, he voiced understanding

## 2023-01-12 ENCOUNTER — TELEPHONE (OUTPATIENT)
Dept: GASTROENTEROLOGY | Facility: CLINIC | Age: 83
End: 2023-01-12
Payer: MEDICARE

## 2023-01-12 ENCOUNTER — PATIENT MESSAGE (OUTPATIENT)
Dept: GASTROENTEROLOGY | Facility: CLINIC | Age: 83
End: 2023-01-12
Payer: MEDICARE

## 2023-01-12 NOTE — TELEPHONE ENCOUNTER
----- Message from Katya Owusu sent at 1/12/2023  8:38 AM CST -----  Contact: pt  Type:  Sooner Apoointment Request    Caller is requesting a sooner appointment.  Caller declined first available appointment listed below.  Caller will not accept being placed on the waitlist and is requesting a message be sent to doctor.  Name of Caller:pt  When is the first available appointment? 3/9  Symptoms: constipation  Would the patient rather a call back or a response via [facility]? phone  Best Call Back Number:[phone]  Additional Information: this is a pt of dr. Melo (pt states an appt preferably next week)

## 2023-01-12 NOTE — TELEPHONE ENCOUNTER
Returned call to pt's wife, Jenna who answered and then hung up. When I called her back I was unable to leave a message. There was only a busy signal.

## 2023-01-12 NOTE — TELEPHONE ENCOUNTER
----- Message from Fidelina Lew sent at 1/12/2023  9:16 AM CST -----  Contact: Jenna (Wife)  Jenna stated that pt was seen by Kameron, stated that he has chronic diarhea/ constipation. Would like a sooner appt than 01/23/2023. Please call her back at [phone].

## 2023-01-12 NOTE — TELEPHONE ENCOUNTER
----- Message from Fidelina Lew sent at 1/12/2023  9:16 AM CST -----  Contact: Jenna (Wife)  Jenna stated that pt was seen by Kameron, stated that he has chronic diarhea/ constipation. Would like a sooner appt than 01/23/2023. Please call her back at [phone].

## 2023-01-25 ENCOUNTER — TELEPHONE (OUTPATIENT)
Dept: UROLOGY | Facility: CLINIC | Age: 83
End: 2023-01-25
Payer: MEDICARE

## 2023-01-25 NOTE — TELEPHONE ENCOUNTER
Phone rings once then has busy signal     ----- Message from Gisela Mckeon sent at 1/25/2023 11:05 AM CST -----  Contact: Aston Clancy is calling to speak to the nurse regarding a medication he was prescribed that made his blood pressure go up so his pcp instructed him to stop taking it. The patient would like to know if there is a replacement he can take. Please contact the patient after 2 at [phone]    Thanks  LJ

## 2023-01-30 ENCOUNTER — TELEPHONE (OUTPATIENT)
Dept: GASTROENTEROLOGY | Facility: CLINIC | Age: 83
End: 2023-01-30
Payer: MEDICARE

## 2023-01-30 NOTE — TELEPHONE ENCOUNTER
Call returned to Regla, pt and wife requested directions to [facility] location and informed of corrected time for a appointment on 02/21/23 @7:30 per .

## 2023-01-30 NOTE — TELEPHONE ENCOUNTER
Spoke with  via phone number provided. Pt informed that there isn't any earlier appointments  available at this time. Pt states he wants to schedule with an MD. Pt scheduled on 02/21/23 with  @3 pm, pt informed that he can arrive at 8 am to be worked in.

## 2023-01-30 NOTE — TELEPHONE ENCOUNTER
----- Message from Jessica Mendiola sent at 1/30/2023  2:26 PM CST -----  Contact: yady/ wife  .Type:  Patient Returning Call    Who Called:yady/wife  Who Left Message for Patient:nurse  Does the patient know what this is regarding?:unknown  Would the patient rather a call back or a response via [facility]? Call back  Best Call Back Number:[phone]  Additional Information: patients wife returning call and stats he was confused about the directions an stats she needs them so she can know where she is going .

## 2023-01-30 NOTE — TELEPHONE ENCOUNTER
----- Message from Courtney Pack sent at 1/30/2023  1:18 PM CST -----  Contact: Aston Clancy is needing a call back regarding him needing an earlier appointment time with Dr. Dueñas at [facility]. Please call him back at [phone].

## 2023-02-01 ENCOUNTER — TELEPHONE (OUTPATIENT)
Dept: UROLOGY | Facility: CLINIC | Age: 83
End: 2023-02-01
Payer: MEDICARE

## 2023-02-01 NOTE — TELEPHONE ENCOUNTER
Returned call to pt; states he has noticed that his blood pressure has been elevated every since he began taking the Vesicare in December.  I asked pt when was the last time he took the medication and he said it was 2 weeks ago; Pt was concerned that taking the medication would interfere with his pending cysto.  I explained to the pt that there would be no issue with him having the cysto and to let Dr. Estrada know this when he comes in for his visit.

## 2023-02-03 ENCOUNTER — PATIENT MESSAGE (OUTPATIENT)
Dept: GASTROENTEROLOGY | Facility: CLINIC | Age: 83
End: 2023-02-03
Payer: MEDICARE

## 2023-02-15 ENCOUNTER — PROCEDURE VISIT (OUTPATIENT)
Dept: UROLOGY | Facility: CLINIC | Age: 83
End: 2023-02-15
Payer: MEDICARE

## 2023-02-15 VITALS
HEART RATE: 60 BPM | SYSTOLIC BLOOD PRESSURE: 159 MMHG | DIASTOLIC BLOOD PRESSURE: 70 MMHG | HEIGHT: 70 IN | WEIGHT: 194 LBS | BODY MASS INDEX: 27.77 KG/M2

## 2023-02-15 DIAGNOSIS — N40.0 BENIGN PROSTATIC HYPERPLASIA WITHOUT LOWER URINARY TRACT SYMPTOMS: Primary | ICD-10-CM

## 2023-02-15 DIAGNOSIS — N32.81 OAB (OVERACTIVE BLADDER): ICD-10-CM

## 2023-02-15 PROCEDURE — 52000 CYSTOURETHROSCOPY: CPT | Mod: PBBFAC | Performed by: UROLOGY

## 2023-02-15 PROCEDURE — 52000 CYSTOURETHROSCOPY: CPT | Mod: S$PBB,,, | Performed by: UROLOGY

## 2023-02-15 PROCEDURE — 52000 PR CYSTOURETHROSCOPY: ICD-10-PCS | Mod: S$PBB,,, | Performed by: UROLOGY

## 2023-02-15 RX ORDER — LIDOCAINE HYDROCHLORIDE 20 MG/ML
JELLY TOPICAL
Status: COMPLETED | OUTPATIENT
Start: 2023-02-15 | End: 2023-02-15

## 2023-02-15 RX ORDER — CIPROFLOXACIN 500 MG/1
500 TABLET ORAL
Status: COMPLETED | OUTPATIENT
Start: 2023-02-15 | End: 2023-02-15

## 2023-02-15 RX ORDER — CEFDINIR 300 MG/1
300 CAPSULE ORAL 2 TIMES DAILY
Qty: 20 CAPSULE | Refills: 0 | Status: SHIPPED | OUTPATIENT
Start: 2023-02-15 | End: 2023-02-25

## 2023-02-15 RX ADMIN — LIDOCAINE HYDROCHLORIDE 10 ML: 20 JELLY TOPICAL at 03:02

## 2023-02-15 RX ADMIN — CIPROFLOXACIN 500 MG: 500 TABLET ORAL at 03:02

## 2023-02-15 NOTE — PROCEDURES
Procedures  Chief Complaint:   Encounter Diagnoses   Name Primary?    Benign prostatic hyperplasia without lower urinary tract symptoms Yes    OAB (overactive bladder)          HPI:   2/15/23- here today for cystoscopy.  Can not really determine if he is had an improvement with the addition of solifenacin, Detrol still on his list as well.  States the last 2 days he is had persistent dysuria, increased frequency especially nocturia.      12/30/22- patient comes in today from [facility] to discuss surgical options.  States that he has definite increased frequency and urgency with occasional urge incontinence, despite being on tamsulosin, finasteride and Detrol 4 mg.  No evidence of dysuria, no gross hematuria, does have a history of smoking.  He also states that he has a weak stream.      12/8/22- LR- Patient is an 82-year-old male that is presenting with an increase in nocturia, up to 4 times nightly and daytime frequency.  Patient states that the daytime frequency and urge incontinence is to the point where he can not leave his house without fear of incontinence.  Patient is currently on tamsulosin, finasteride and Detrol.  Denies gross hematuria.  Urine in clinic was negative in PVR was 45 mL.  States that he has taken Myrbetriq, in the past, with no resolved to symptoms.    Allergies:  Codeine and Oxycodone    Medications:  has a current medication list which includes the following prescription(s): amlodipine, aspirin, baclofen, brimonidine 0.15 % opth drop, buprenorphine, calcium-vitamins b6-b12-fa, cephalexin, cholecalciferol (vitamin d3), diosmin complex no.1, finasteride, gabapentin, hydrochlorothiazide, hypromellose, ibuprofen, lactulose, losartan, moxifloxacin, multivit-min/fa/lycopen/lutein, ofloxacin, omega 3-dha-epa-fish oil, peg 400-propylene glycol (pf), trulance, simvastatin, tamsulosin, and tizanidine.    Review of Systems:  General: No fever, chills, fatigability, or weight loss.  Skin: No rashes,  itching, or changes in color or texture of skin.  Chest: Denies ABEBE, cyanosis, wheezing, cough, and sputum production.  Abdomen: Appetite fine. No weight loss. Denies diarrhea, abdominal pain, hematemesis, or blood in stool.  Musculoskeletal: No joint stiffness or swelling. Denies back pain.  : As above.  All other review of systems negative.    PMH:   has a past medical history of Arthritis, Cardiac syncope (2011), Colon cancer screening (12/5/2016), Glaucoma, History of carotid artery stenosis (9/26/2013), and Hypertension.    PSH:   has a past surgical history that includes Cataract extraction w/  intraocular lens implant (OD 1/5/12); Cataract extraction w/  intraocular lens implant (OS date unknown); Cardiac pacemaker placement; Lumbar fusion (2013); Carotid endarterectomy; Rhizotomy w/ radiofrequency ablation (2010); Cardiac pacemaker placement; and Colonoscopy (N/A, 12/5/2016).    FamHx: family history includes Hypertension in his father and mother.    SocHx:  reports that he quit smoking about 16 years ago. He has a 12.50 pack-year smoking history. He has never used smokeless tobacco. He reports current alcohol use. He reports that he does not use drugs.      Physical Exam:  Vitals:    02/15/23 1317   BP: (!) 159/70   Pulse: 60     General: A&Ox3, no apparent distress, no deformities  Neck: No masses, normal ROM  Lungs: normal inspiration, no use of accessory muscles  Heart: normal pulse, no arrhythmias  Abdomen: Soft, NT, ND, no masses, no hernias, no hepatosplenomegaly  Skin: The skin is warm and dry. No jaundice.  Ext: No c/c/e.  : 2/23- Test desc bandar, no abnormalities of epididymus. Normal penile and scrotal skin. Meatus normal.    Labs/Studies:   Pvr 47 ml 2/23  Cystoscopy lateral lobe coaptation, significant debris, global erythema 2/23    Procedure: Diagnostic Cystoscopy    Procedure in Detail: After proper consents were obtained, the patient was prepped and draped in normal sterile fashion for  diagnostic cystoscopy. 5 ml of lidocaine jelly was instilled in the urethra. The flexible cystoscope was then introduced into the urethra, and advanced into the bladder under direct vision. The urethral mucosa appeared normal, and no strictures were noted. The sphincter appeared to be normal, and the veru montanum was unremarkable. The prostatic mucosa demonstrates lateral lobe coaptation. The bladder neck was normal. Inspection of the interior of the bladder was then carried out. The trigone was unremarkable, with no mucosal lesions. The ureteral orifices were normal in position and configuration. Systematic inspection of the mucosa of the bladder it was then carried out, rotating the cystoscope so that all areas of the left and right lateral walls, the dome of the bladder, and the posterior wall were all visualized. The cystoscope was then advanced further into the bladder, and maximum deflection of the scope was performed so that the bladder neck could be inspected. No mucosal lesions were noted there.  He did have significant debris though and generalized erythema.  The cystoscope was then removed, and the procedure terminated.     Findings:  Lateral lobe coaptation, significant debris, global erythema      Impression/Plan:     1. BPH- tamsulosin and finasteride do not appear to be completely controlling his symptoms as he still states that he has a weak stream with urgency.  Please see below.    2. Urge incontinence- currently on VESIcare 10 mg, of note he has failed myrbetriq in the past.  He does believe that the Detrol works better.  This is obviously his biggest complaint, although I am concerned this could be secondary to BPH.  Although looking at patient's med profile, it appears that he is on both Detrol and VESIcare, he is uncertain of which meds he is taking definitively.  Cystoscopic findings of generalized erythema and debris consistent with possible UTI.  Will go ahead and treat with 10 days of  cefdinir, have him return after that to re-evaluate symptoms.  At this point with his multiple back surgeries and symptoms of urgency, Botox may be an option, although he could potentially develop retention.  No significant obstruction though on cystoscopy today.  After discussing this though, patient is a little concerned about pursuing surgical management.  Hopefully with control of the infection and by initiating VESIcare this will improve upon return.

## 2023-02-22 ENCOUNTER — OFFICE VISIT (OUTPATIENT)
Dept: GASTROENTEROLOGY | Facility: CLINIC | Age: 83
End: 2023-02-22
Payer: MEDICARE

## 2023-02-22 ENCOUNTER — HOSPITAL ENCOUNTER (OUTPATIENT)
Dept: RADIOLOGY | Facility: HOSPITAL | Age: 83
Discharge: HOME | End: 2023-02-22
Attending: INTERNAL MEDICINE
Payer: MEDICARE

## 2023-02-22 ENCOUNTER — PATIENT MESSAGE (OUTPATIENT)
Dept: GASTROENTEROLOGY | Facility: CLINIC | Age: 83
End: 2023-02-22

## 2023-02-22 VITALS
WEIGHT: 187.38 LBS | SYSTOLIC BLOOD PRESSURE: 122 MMHG | DIASTOLIC BLOOD PRESSURE: 60 MMHG | HEIGHT: 70 IN | BODY MASS INDEX: 26.82 KG/M2

## 2023-02-22 DIAGNOSIS — K59.00 CONSTIPATION, UNSPECIFIED CONSTIPATION TYPE: Primary | ICD-10-CM

## 2023-02-22 DIAGNOSIS — K59.04 CHRONIC IDIOPATHIC CONSTIPATION: Primary | ICD-10-CM

## 2023-02-22 DIAGNOSIS — K59.04 CHRONIC IDIOPATHIC CONSTIPATION: ICD-10-CM

## 2023-02-22 PROCEDURE — 99214 OFFICE O/P EST MOD 30 MIN: CPT | Mod: S$PBB,,, | Performed by: INTERNAL MEDICINE

## 2023-02-22 PROCEDURE — 99213 OFFICE O/P EST LOW 20 MIN: CPT | Mod: PBBFAC | Performed by: INTERNAL MEDICINE

## 2023-02-22 PROCEDURE — 74018 XR ABDOMEN AP 1 VIEW: ICD-10-PCS | Mod: 26,,, | Performed by: RADIOLOGY

## 2023-02-22 PROCEDURE — 99999 PR PBB SHADOW E&M-EST. PATIENT-LVL III: ICD-10-PCS | Mod: PBBFAC,,, | Performed by: INTERNAL MEDICINE

## 2023-02-22 PROCEDURE — 74018 RADEX ABDOMEN 1 VIEW: CPT | Mod: TC

## 2023-02-22 PROCEDURE — 99999 PR PBB SHADOW E&M-EST. PATIENT-LVL III: CPT | Mod: PBBFAC,,, | Performed by: INTERNAL MEDICINE

## 2023-02-22 PROCEDURE — 74018 RADEX ABDOMEN 1 VIEW: CPT | Mod: 26,,, | Performed by: RADIOLOGY

## 2023-02-22 PROCEDURE — 99214 PR OFFICE/OUTPT VISIT, EST, LEVL IV, 30-39 MIN: ICD-10-PCS | Mod: S$PBB,,, | Performed by: INTERNAL MEDICINE

## 2023-02-22 RX ORDER — SOLIFENACIN SUCCINATE 10 MG/1
10 TABLET, FILM COATED ORAL DAILY
COMMUNITY
Start: 2023-01-21 | End: 2023-03-15

## 2023-02-22 NOTE — ASSESSMENT & PLAN NOTE
Plan:   -Will order abdominal xray   -Will start on Linzess 145mcg. If this does not work, we can increase to 290mcg  -Discontinue Lactulose   -Patient was instructed to stop taking over the counter laxatives.

## 2023-02-22 NOTE — PROGRESS NOTES
Clinic Consult:  [facility] Consultation Note    Reason for Consult:  The encounter diagnosis was Chronic idiopathic constipation.    PCP: Artur Simpson       HPI:  This is a 83 y.o. male here for evaluation of constipation.  Last seen by Dr Melo 2022.   He has been on Miralax, Colace, Lactulose, and Movantik for constipation.   He was prescribed Linzess 145mcg by Dr Melo but does not recall taking it.   It appears he was prescribed Trulance by PCP but has not taken that medication either.   He has been experiencing constipation for years but feels like nothing is helping to alleviate the problem.   He feels only enemas work.  Does not drink a lot of water.   Endorses a high fiber diet.   He has a pain pump and is on pain patches for back pain.       ROS:  As per HPI       Medical History:   Past Medical History:   Diagnosis Date    Arthritis     Cardiac syncope     s/p pacemaker     Colon cancer screening 2016    Glaucoma     History of carotid artery stenosis 2013    No stenosis on scan done 2014. S/p CEA     Hypertension        Surgical History:  Past Surgical History:   Procedure Laterality Date    CARDIAC PACEMAKER PLACEMENT      CARDIAC PACEMAKER PLACEMENT      CAROTID ENDARTERECTOMY      CATARACT EXTRACTION W/  INTRAOCULAR LENS IMPLANT  OD 12    CATARACT EXTRACTION W/  INTRAOCULAR LENS IMPLANT  OS date unknown    COLONOSCOPY N/A 2016    Procedure: COLONOSCOPY;  Surgeon: Kary Kohler MD;  Location: [facility];  Service: Endoscopy;  Laterality: N/A;    LUMBAR FUSION      RHIZOTOMY W/ RADIOFREQUENCY ABLATION         Family History:   Family History   Problem Relation Age of Onset    Hypertension Mother     Hypertension Father        Social History:   Social History     Tobacco Use    Smoking status: Former     Packs/day: 0.25     Years: 50.00     Pack years: 12.50     Types: Cigarettes     Quit date: 2006     Years since quittin.8    Smokeless  tobacco: Never   Substance Use Topics    Alcohol use: Yes     Comment: OCC    Drug use: No       Allergies: Reviewed    Home Medications:   Medication List with Changes/Refills   New Medications    LINACLOTIDE (LINZESS) 145 MCG CAP CAPSULE    Take 1 capsule (145 mcg total) by mouth before breakfast.   Current Medications    AMLODIPINE (NORVASC) 10 MG TABLET    Take 1 tablet (10 mg total) by mouth once daily.    ASPIRIN (ECOTRIN) 81 MG EC TABLET    Take by mouth. 1 Tablet, Delayed Release (E.C.) Oral Every day    BACLOFEN (LIORESAL) 10 MG TABLET    TAKE 1 TABLET BY MOUTH EVERY 8-12 HOURS AS NEEDED    BRIMONIDINE 0.15 % OPTH DROP (ALPHAGAN) 0.15 % OPHTHALMIC SOLUTION    1 drop.    BUPRENORPHINE 15 MCG/HOUR PTWK    APPLY 1 PATCH ON THE SKIN WEEKLY    CALCIUM-VITAMINS B6-B12-FA TAB    Take by mouth.    CEFDINIR (OMNICEF) 300 MG CAPSULE    Take 1 capsule (300 mg total) by mouth 2 (two) times daily. for 10 days    CEPHALEXIN (KEFLEX) 500 MG CAPSULE    TAKE 1 TABLET BY MOUTH 3 TIMES PER DAY FOR 7 DAYS    CHOLECALCIFEROL, VITAMIN D3, (VITAMIN D3) 10,000 UNIT CAP    Take 1 tablet by mouth once daily.    DIOSMIN COMPLEX NO.1 (VASCULERA ORAL)    Take by mouth.    FINASTERIDE (PROSCAR) 5 MG TABLET    Take 5 mg by mouth once daily.    GABAPENTIN (NEURONTIN) 600 MG TABLET    TAKE 1 TABLET TWICE A DAY    HYDROCHLOROTHIAZIDE (HYDRODIURIL) 12.5 MG TAB    Take 1 tablet (12.5 mg total) by mouth every morning.    HYPROMELLOSE (SYSTANE GEL OPHT)    Apply to eye.    IBUPROFEN (ADVIL,MOTRIN) 800 MG TABLET    Take 800 mg by mouth every 8 (eight) hours.    LACTULOSE (CHRONULAC) 20 GRAM/30 ML SOLN    Take 30 mLs (20 g total) by mouth 2 (two) times daily.    LOSARTAN (COZAAR) 100 MG TABLET    Take 1 tablet (100 mg total) by mouth once daily.    MOXIFLOXACIN (VIGAMOX) 0.5 % OPHTHALMIC SOLUTION        MULTIVIT-MIN/FA/LYCOPEN/LUTEIN (CENTRUM SILVER MEN ORAL)    Take by mouth.    OFLOXACIN (OCUFLOX) 0.3 % OPHTHALMIC SOLUTION    1 drop 4 (four)  "times daily.    OMEGA 3-DHA-EPA-FISH -1,000 MG CAP    Take 2 g by mouth.    -PROPYLENE GLYCOL, PF, (SYSTANE ULTRA/LUBRICANT EYE, PF,) 0.4-0.3 % DPET    Apply to eye.    PLECANATIDE (TRULANCE) 3 MG TAB    Take 3 mg by mouth once daily.    SIMVASTATIN (ZOCOR) 40 MG TABLET    Take 1 tablet (40 mg total) by mouth every evening.    SOLIFENACIN (VESICARE) 10 MG TABLET    Take 10 mg by mouth once daily.    TAMSULOSIN (FLOMAX) 0.4 MG CAP    Take 1 capsule by mouth 2 (two) times daily.    TIZANIDINE (ZANAFLEX) 4 MG TABLET    TAKE 1-2 TABLETS BY MOUTH AT BEDTIME AS NEEDED FOR MUSCLE SPASMS         Physical Exam:  Vital Signs:  /60   Ht 5' 10" (1.778 m)   Wt 85 kg (187 lb 6.3 oz)   BMI 26.89 kg/m²   Body mass index is 26.89 kg/m².    Physical Exam  Constitutional:       Appearance: Normal appearance.   HENT:      Head: Normocephalic.   Eyes:      Conjunctiva/sclera: Conjunctivae normal.   Pulmonary:      Effort: Pulmonary effort is normal.   Abdominal:      General: There is no distension.      Palpations: Abdomen is soft.      Tenderness: There is no abdominal tenderness. There is no guarding.   Neurological:      Mental Status: He is alert.        Labs: Pertinent labs reviewed.        Assessment:  Problem List Items Addressed This Visit          GI    Chronic idiopathic constipation - Primary    Current Assessment & Plan     Plan:   -Will order abdominal xray   -Will start on Linzess 145mcg. If this does not work, we can increase to 290mcg  -Discontinue Lactulose   -Patient was instructed to stop taking over the counter laxatives.           Chronic idiopathic constipation  -     Cancel: X-Ray Abdomen Flat And Erect; Future; Expected date: 02/22/2023  -     linaCLOtide (LINZESS) 145 mcg Cap capsule; Take 1 capsule (145 mcg total) by mouth before breakfast.  Dispense: 90 capsule; Refill: 0                Follow up in about 3 months (around 5/22/2023).    Order summary:  No orders of the defined types " were placed in this encounter.      Thank you so much for allowing me to participate in the care of Aston Jalloh MD  Gastroenterology and Hepatology  [facility]

## 2023-02-23 ENCOUNTER — PATIENT MESSAGE (OUTPATIENT)
Dept: UROLOGY | Facility: CLINIC | Age: 83
End: 2023-02-23
Payer: MEDICARE

## 2023-02-24 ENCOUNTER — PATIENT MESSAGE (OUTPATIENT)
Dept: GASTROENTEROLOGY | Facility: CLINIC | Age: 83
End: 2023-02-24
Payer: MEDICARE

## 2023-03-03 ENCOUNTER — TELEPHONE (OUTPATIENT)
Dept: GASTROENTEROLOGY | Facility: CLINIC | Age: 83
End: 2023-03-03
Payer: MEDICARE

## 2023-03-03 NOTE — TELEPHONE ENCOUNTER
----- Message from Ange Srivastava sent at 3/3/2023  8:15 AM CST -----  Contact: Aston  Patient is calling to speak with the nurse in regards to medication.Reports returning call to let provider know about medication working.  Please give patient a callback at [phone].

## 2023-03-07 ENCOUNTER — TELEPHONE (OUTPATIENT)
Dept: GASTROENTEROLOGY | Facility: CLINIC | Age: 83
End: 2023-03-07
Payer: MEDICARE

## 2023-03-07 ENCOUNTER — PATIENT MESSAGE (OUTPATIENT)
Dept: GASTROENTEROLOGY | Facility: CLINIC | Age: 83
End: 2023-03-07
Payer: MEDICARE

## 2023-03-07 NOTE — TELEPHONE ENCOUNTER
----- Message from Fidelina Lew sent at 3/7/2023  9:21 AM CST -----  Contact: Aston   Patient stated that he is taking medication for constipation (didn't know the medication), asked if he can get a refill for a 2 day supply enough for 30 day Patient would like a call back at [phone], stated that he has called numerous times about this.     .    [facility] [place] - [address] [address]  [place] [address]  Phone: [phone] Fax: [phone]

## 2023-03-15 ENCOUNTER — HOSPITAL ENCOUNTER (OUTPATIENT)
Dept: RADIOLOGY | Facility: HOSPITAL | Age: 83
Discharge: HOME | End: 2023-03-15
Attending: UROLOGY
Payer: MEDICARE

## 2023-03-15 ENCOUNTER — OFFICE VISIT (OUTPATIENT)
Dept: UROLOGY | Facility: CLINIC | Age: 83
End: 2023-03-15
Payer: MEDICARE

## 2023-03-15 ENCOUNTER — HOSPITAL ENCOUNTER (OUTPATIENT)
Dept: CARDIOLOGY | Facility: HOSPITAL | Age: 83
Discharge: HOME | End: 2023-03-15
Attending: UROLOGY
Payer: MEDICARE

## 2023-03-15 VITALS
SYSTOLIC BLOOD PRESSURE: 116 MMHG | TEMPERATURE: 97 F | HEIGHT: 70 IN | WEIGHT: 187.25 LBS | DIASTOLIC BLOOD PRESSURE: 53 MMHG | BODY MASS INDEX: 26.81 KG/M2 | RESPIRATION RATE: 18 BRPM | HEART RATE: 59 BPM

## 2023-03-15 DIAGNOSIS — Z01.818 PRE-OP EXAM: ICD-10-CM

## 2023-03-15 DIAGNOSIS — Z01.818 PRE-OP EXAM: Primary | ICD-10-CM

## 2023-03-15 DIAGNOSIS — N32.81 OAB (OVERACTIVE BLADDER): ICD-10-CM

## 2023-03-15 DIAGNOSIS — N40.0 BENIGN PROSTATIC HYPERPLASIA WITHOUT LOWER URINARY TRACT SYMPTOMS: ICD-10-CM

## 2023-03-15 PROCEDURE — 71046 X-RAY EXAM CHEST 2 VIEWS: CPT | Mod: 26,,, | Performed by: RADIOLOGY

## 2023-03-15 PROCEDURE — 93010 EKG 12-LEAD: ICD-10-PCS | Mod: ,,, | Performed by: INTERNAL MEDICINE

## 2023-03-15 PROCEDURE — 99214 PR OFFICE/OUTPT VISIT, EST, LEVL IV, 30-39 MIN: ICD-10-PCS | Mod: S$PBB,,, | Performed by: UROLOGY

## 2023-03-15 PROCEDURE — 99215 OFFICE O/P EST HI 40 MIN: CPT | Mod: PBBFAC,25 | Performed by: UROLOGY

## 2023-03-15 PROCEDURE — 71046 XR CHEST PA AND LATERAL: ICD-10-PCS | Mod: 26,,, | Performed by: RADIOLOGY

## 2023-03-15 PROCEDURE — 99999 PR PBB SHADOW E&M-EST. PATIENT-LVL V: ICD-10-PCS | Mod: PBBFAC,,, | Performed by: UROLOGY

## 2023-03-15 PROCEDURE — 93005 ELECTROCARDIOGRAM TRACING: CPT

## 2023-03-15 PROCEDURE — 93010 ELECTROCARDIOGRAM REPORT: CPT | Mod: ,,, | Performed by: INTERNAL MEDICINE

## 2023-03-15 PROCEDURE — 99214 OFFICE O/P EST MOD 30 MIN: CPT | Mod: S$PBB,,, | Performed by: UROLOGY

## 2023-03-15 PROCEDURE — 71046 X-RAY EXAM CHEST 2 VIEWS: CPT | Mod: TC

## 2023-03-15 PROCEDURE — 99999 PR PBB SHADOW E&M-EST. PATIENT-LVL V: CPT | Mod: PBBFAC,,, | Performed by: UROLOGY

## 2023-03-15 RX ORDER — TAMSULOSIN HYDROCHLORIDE 0.4 MG/1
1 CAPSULE ORAL 2 TIMES DAILY
Qty: 90 CAPSULE | Refills: 3 | Status: SHIPPED | OUTPATIENT
Start: 2023-03-15

## 2023-03-15 RX ORDER — FINASTERIDE 5 MG/1
5 TABLET, FILM COATED ORAL DAILY
Qty: 90 TABLET | Refills: 3 | Status: SHIPPED | OUTPATIENT
Start: 2023-03-15

## 2023-03-15 NOTE — H&P (VIEW-ONLY)
Chief Complaint:   Encounter Diagnoses   Name Primary?    Benign prostatic hyperplasia without lower urinary tract symptoms Yes    OAB (overactive bladder)        HPI:   3/15/23- patient is still uncertain as to whether he is taking his overactive medications, these did not work in the past though.  States that his flow seems to be appropriate.       12/30/22- patient comes in today from [facility] to discuss surgical options.  States that he has definite increased frequency and urgency with occasional urge incontinence, despite being on tamsulosin, finasteride and Detrol 4 mg.  No evidence of dysuria, no gross hematuria, does have a history of smoking.  He also states that he has a weak stream.      12/8/22- LR- Patient is an 82-year-old male that is presenting with an increase in nocturia, up to 4 times nightly and daytime frequency.  Patient states that the daytime frequency and urge incontinence is to the point where he can not leave his house without fear of incontinence.  Patient is currently on tamsulosin, finasteride and Detrol.  Denies gross hematuria.  Urine in clinic was negative in PVR was 45 mL.  States that he has taken Myrbetriq, in the past, with no resolved to symptoms.    Allergies:  Codeine and Oxycodone    Medications:  has a current medication list which includes the following prescription(s): amlodipine, aspirin, baclofen, brimonidine 0.15 % opth drop, buprenorphine, calcium-vitamins b6-b12-fa, cephalexin, cholecalciferol (vitamin d3), diosmin complex no.1, finasteride, gabapentin, hydrochlorothiazide, hypromellose, ibuprofen, lactulose, losartan, moxifloxacin, multivit-min/fa/lycopen/lutein, ofloxacin, omega 3-dha-epa-fish oil, peg 400-propylene glycol (pf), trulance, simvastatin, tamsulosin, and tizanidine.    Review of Systems:  General: No fever, chills, fatigability, or weight loss.  Skin: No rashes, itching, or changes in color or texture of skin.  Chest: Denies ABEBE, cyanosis, wheezing, cough,  and sputum production.  Abdomen: Appetite fine. No weight loss. Denies diarrhea, abdominal pain, hematemesis, or blood in stool.  Musculoskeletal: No joint stiffness or swelling. Denies back pain.  : As above.  All other review of systems negative.    PMH:   has a past medical history of Arthritis, Cardiac syncope (2011), Colon cancer screening (12/5/2016), Glaucoma, History of carotid artery stenosis (9/26/2013), and Hypertension.    PSH:   has a past surgical history that includes Cataract extraction w/  intraocular lens implant (OD 1/5/12); Cataract extraction w/  intraocular lens implant (OS date unknown); Cardiac pacemaker placement; Lumbar fusion (2013); Carotid endarterectomy; Rhizotomy w/ radiofrequency ablation (2010); Cardiac pacemaker placement; and Colonoscopy (N/A, 12/5/2016).    FamHx: family history includes Hypertension in his father and mother.    SocHx:  reports that he quit smoking about 16 years ago. He has a 12.50 pack-year smoking history. He has never used smokeless tobacco. He reports current alcohol use. He reports that he does not use drugs.      Physical Exam:  Vitals:    02/15/23 1317   BP: (!) 159/70   Pulse: 60     General: A&Ox3, no apparent distress, no deformities  Neck: No masses, normal ROM  Lungs: normal inspiration, no use of accessory muscles  Heart: normal pulse, no arrhythmias  Abdomen: Soft, NT, ND, no masses, no hernias, no hepatosplenomegaly  Skin: The skin is warm and dry. No jaundice.  Ext: No c/c/e.  : 2/23- Test desc bandar, no abnormalities of epididymus. Normal penile and scrotal skin. Meatus normal.    Labs/Studies:   Pvr 47 ml 2/23  Cystoscopy lateral lobe coaptation, significant debris, global erythema 2/23    Impression/Plan:       1. BPH- tamsulosin and finasteride appear to be controlling his flow, cystoscopy demonstrated an open bladder neck at the last appointment.  His biggest complaint today is urgency.    2. Urge incontinence- there was no change after  taking the antibiotics, states that he has a good flow as stated above.  Urgency appears to be his biggest issue, no gross hematuria.  VESIcare did not improve and actually caused difficulty with hypertension.  Patient has previously failed Detrol as well and at this point he wants to pursue tertiary therapy.  Therefore will schedule for cystoscopy with Botox injection, he understands the risk of retention.      Patient understands the risks, benefits and alternatives of the above-stated procedure.  These include but are not limited to damage to the surrounding structures including the urethra, ureters and bladder.  Risk of need for stents or possible catheterization if she has retention and cannot void postprocedure.  Risk of infection, hematuria, discomfort and pain with burning.  Risk of persistent incontinence.  Understanding that this will need to be repeated once the Botox wears off.  Risk of heart attack, stroke, death, DVT and PE.  Patient understanding of all the above has elected to pursue the procedure as stated.

## 2023-03-15 NOTE — PROGRESS NOTES
Chief Complaint:   Encounter Diagnoses   Name Primary?    Benign prostatic hyperplasia without lower urinary tract symptoms Yes    OAB (overactive bladder)        HPI:   3/15/23- patient is still uncertain as to whether he is taking his overactive medications, these did not work in the past though.  States that his flow seems to be appropriate.       12/30/22- patient comes in today from [facility] to discuss surgical options.  States that he has definite increased frequency and urgency with occasional urge incontinence, despite being on tamsulosin, finasteride and Detrol 4 mg.  No evidence of dysuria, no gross hematuria, does have a history of smoking.  He also states that he has a weak stream.      12/8/22- LR- Patient is an 82-year-old male that is presenting with an increase in nocturia, up to 4 times nightly and daytime frequency.  Patient states that the daytime frequency and urge incontinence is to the point where he can not leave his house without fear of incontinence.  Patient is currently on tamsulosin, finasteride and Detrol.  Denies gross hematuria.  Urine in clinic was negative in PVR was 45 mL.  States that he has taken Myrbetriq, in the past, with no resolved to symptoms.    Allergies:  Codeine and Oxycodone    Medications:  has a current medication list which includes the following prescription(s): amlodipine, aspirin, baclofen, brimonidine 0.15 % opth drop, buprenorphine, calcium-vitamins b6-b12-fa, cephalexin, cholecalciferol (vitamin d3), diosmin complex no.1, finasteride, gabapentin, hydrochlorothiazide, hypromellose, ibuprofen, lactulose, losartan, moxifloxacin, multivit-min/fa/lycopen/lutein, ofloxacin, omega 3-dha-epa-fish oil, peg 400-propylene glycol (pf), trulance, simvastatin, tamsulosin, and tizanidine.    Review of Systems:  General: No fever, chills, fatigability, or weight loss.  Skin: No rashes, itching, or changes in color or texture of skin.  Chest: Denies ABEBE, cyanosis, wheezing, cough,  and sputum production.  Abdomen: Appetite fine. No weight loss. Denies diarrhea, abdominal pain, hematemesis, or blood in stool.  Musculoskeletal: No joint stiffness or swelling. Denies back pain.  : As above.  All other review of systems negative.    PMH:   has a past medical history of Arthritis, Cardiac syncope (2011), Colon cancer screening (12/5/2016), Glaucoma, History of carotid artery stenosis (9/26/2013), and Hypertension.    PSH:   has a past surgical history that includes Cataract extraction w/  intraocular lens implant (OD 1/5/12); Cataract extraction w/  intraocular lens implant (OS date unknown); Cardiac pacemaker placement; Lumbar fusion (2013); Carotid endarterectomy; Rhizotomy w/ radiofrequency ablation (2010); Cardiac pacemaker placement; and Colonoscopy (N/A, 12/5/2016).    FamHx: family history includes Hypertension in his father and mother.    SocHx:  reports that he quit smoking about 16 years ago. He has a 12.50 pack-year smoking history. He has never used smokeless tobacco. He reports current alcohol use. He reports that he does not use drugs.      Physical Exam:  Vitals:    02/15/23 1317   BP: (!) 159/70   Pulse: 60     General: A&Ox3, no apparent distress, no deformities  Neck: No masses, normal ROM  Lungs: normal inspiration, no use of accessory muscles  Heart: normal pulse, no arrhythmias  Abdomen: Soft, NT, ND, no masses, no hernias, no hepatosplenomegaly  Skin: The skin is warm and dry. No jaundice.  Ext: No c/c/e.  : 2/23- Test desc bandar, no abnormalities of epididymus. Normal penile and scrotal skin. Meatus normal.    Labs/Studies:   Pvr 47 ml 2/23  Cystoscopy lateral lobe coaptation, significant debris, global erythema 2/23    Impression/Plan:       1. BPH- tamsulosin and finasteride appear to be controlling his flow, cystoscopy demonstrated an open bladder neck at the last appointment.  His biggest complaint today is urgency.    2. Urge incontinence- there was no change after  taking the antibiotics, states that he has a good flow as stated above.  Urgency appears to be his biggest issue, no gross hematuria.  VESIcare did not improve and actually caused difficulty with hypertension.  Patient has previously failed Detrol as well and at this point he wants to pursue tertiary therapy.  Therefore will schedule for cystoscopy with Botox injection, he understands the risk of retention.      Patient understands the risks, benefits and alternatives of the above-stated procedure.  These include but are not limited to damage to the surrounding structures including the urethra, ureters and bladder.  Risk of need for stents or possible catheterization if she has retention and cannot void postprocedure.  Risk of infection, hematuria, discomfort and pain with burning.  Risk of persistent incontinence.  Understanding that this will need to be repeated once the Botox wears off.  Risk of heart attack, stroke, death, DVT and PE.  Patient understanding of all the above has elected to pursue the procedure as stated.

## 2023-03-16 ENCOUNTER — TELEPHONE (OUTPATIENT)
Dept: UROLOGY | Facility: CLINIC | Age: 83
End: 2023-03-16
Payer: MEDICARE

## 2023-03-16 NOTE — TELEPHONE ENCOUNTER
Cardiac clearance request was sent with the studies results at Dr Valdez Marks office, [phone] and [phone].     Loraine Milanes RN

## 2023-03-17 ENCOUNTER — TELEPHONE (OUTPATIENT)
Dept: PREADMISSION TESTING | Facility: HOSPITAL | Age: 83
End: 2023-03-17
Payer: MEDICARE

## 2023-03-17 ENCOUNTER — TELEPHONE (OUTPATIENT)
Dept: UROLOGY | Facility: CLINIC | Age: 83
End: 2023-03-17
Payer: MEDICARE

## 2023-03-17 RX ORDER — TENAPANOR HYDROCHLORIDE 53.2 MG/1
TABLET ORAL DAILY
COMMUNITY
End: 2023-05-24

## 2023-03-17 NOTE — TELEPHONE ENCOUNTER
Pre op instructions reviewed with Pt ,verbalized understanding.    To confirm, Surgery is scheduled on 3/21/23. We will call you late afternoon the business day prior to surgery with your arrival time.    *Please report to the [facility]obby (1st Floor) located off of [facility] (2nd Entrance/Building on the left, in front of the flag pole).  Address: [address] [place] [address] [place] [address]        INSTRUCTIONS IMPORTANT!!!  Do Not Eat, Drink, or Smoke after 12 midnight unless instructed otherwise by your Surgeon. OK to brush teeth, no gum, candy or mints!      *Take Only these medications with a small sip of water Morning of Surgery:  Amlodipine  Gabapentin  Tamsulosin  Eye Drops      ____  HOLD all vitamins, herbal supplements, aspirin products & NSAIDS 7 days prior to surgery, as these can thin the blood.  ____  NO Acrylic/fake nails or nail polish worn day of surgery (specifically hand/arm & foot surgeries).  ____  NO powder, lotions, deodorants, oils or cream on body.  ____  Remove all jewelry & piercings prior to surgery.  ____  Remove Dentures, Hearing Aids & Contact Lens prior to surgery.  ____  Bring photo ID and insurance information to hospital (Leave Valuables at Home).  ____  If going home the same day, arrange for a ride home. You will not be able to drive for 24 hrs if Anesthesia was used.   ____  Females (ages 11-60): may need to give a urine sample the morning of surgery; please see Pre op Nurse prior to using the restroom.  ____  Males: Stop ED medications (Viagra, Cialis) 24 hrs prior to surgery.  ____  Wear clean, loose fitting clothing to allow for dressings/ bandages.            Diabetic Patients: If you take diabetic medication, do NOT take morning of surgery unless instructed by Doctor. Metformin to be stopped 24 hrs prior to surgery time. DO NOT take long-acting insulin the evening before surgery. Blood sugars will be checked in pre-op by Nurse.    Bathing Instructions:     -Shower with anti-bacterial Soap (Hibiclens or Dial) the night before surgery and the morning of.   -Do not use Hibiclens on your face or genitals.   -Apply clean clothes after shower.  -Do not shave your face or body 2 days prior to surgery unless instructed otherwise by your Surgeon.  -Do not shave pubic hair 7 days prior to surgery (gyn pt's).    [facility] Visitor/Ride Policy:  Only 2 adults allowed (over the age of 18) to accompany you to the Hospital. You Must have a ride home from a responsible adult that you know and trust. Medical Transport, Uber or Lyft can only be used if patient has a responsible adult to accompany them during ride home.    Discharge Instructions: You will receive Post-op/Discharge instructions by your Discharge Nurse prior to going home. Please call your Surgeon's office with any post-surgery questions/concerns @ [phone].    *If you are running late or have questions the morning of surgery, please call the Surgery Dept @ [phone]  *Insurance/ Financial Questions, please call [phone]    Thank you,  -[facility] Pre Admit Testing Nurse  [phone] or [phone]  M-F 7:30 am-4:30 pm (Closed Major Holidays)

## 2023-03-20 ENCOUNTER — TELEPHONE (OUTPATIENT)
Dept: PREADMISSION TESTING | Facility: HOSPITAL | Age: 83
End: 2023-03-20
Payer: MEDICARE

## 2023-03-21 ENCOUNTER — HOSPITAL ENCOUNTER (OUTPATIENT)
Facility: HOSPITAL | Age: 83
Discharge: HOME | End: 2023-03-21
Attending: UROLOGY | Admitting: UROLOGY
Payer: MEDICARE

## 2023-03-21 ENCOUNTER — ANESTHESIA (OUTPATIENT)
Dept: SURGERY | Facility: HOSPITAL | Age: 83
End: 2023-03-21
Payer: MEDICARE

## 2023-03-21 ENCOUNTER — ANESTHESIA EVENT (OUTPATIENT)
Dept: SURGERY | Facility: HOSPITAL | Age: 83
End: 2023-03-21
Payer: MEDICARE

## 2023-03-21 DIAGNOSIS — N32.81 OAB (OVERACTIVE BLADDER): ICD-10-CM

## 2023-03-21 DIAGNOSIS — N40.0 BENIGN PROSTATIC HYPERPLASIA WITHOUT LOWER URINARY TRACT SYMPTOMS: ICD-10-CM

## 2023-03-21 PROCEDURE — 71000015 HC POSTOP RECOV 1ST HR: Performed by: UROLOGY

## 2023-03-21 PROCEDURE — 63600175 PHARM REV CODE 636 W HCPCS: Mod: JZ,JG | Performed by: UROLOGY

## 2023-03-21 PROCEDURE — 25000003 PHARM REV CODE 250: Performed by: NURSE ANESTHETIST, CERTIFIED REGISTERED

## 2023-03-21 PROCEDURE — 63600175 PHARM REV CODE 636 W HCPCS: Performed by: ANESTHESIOLOGY

## 2023-03-21 PROCEDURE — 63600175 PHARM REV CODE 636 W HCPCS: Performed by: NURSE ANESTHETIST, CERTIFIED REGISTERED

## 2023-03-21 PROCEDURE — 37000008 HC ANESTHESIA 1ST 15 MINUTES: Performed by: UROLOGY

## 2023-03-21 PROCEDURE — 52287 CYSTOSCOPY CHEMODENERVATION: CPT | Mod: ,,, | Performed by: UROLOGY

## 2023-03-21 PROCEDURE — 37000009 HC ANESTHESIA EA ADD 15 MINS: Performed by: UROLOGY

## 2023-03-21 PROCEDURE — 36000707: Performed by: UROLOGY

## 2023-03-21 PROCEDURE — 52287 PR CYSTOURETHROSCOPY WITH INJ FOR CHEMODENERVATION: ICD-10-PCS | Mod: ,,, | Performed by: UROLOGY

## 2023-03-21 PROCEDURE — 25000003 PHARM REV CODE 250: Performed by: ANESTHESIOLOGY

## 2023-03-21 PROCEDURE — 36000706: Performed by: UROLOGY

## 2023-03-21 PROCEDURE — 63600175 PHARM REV CODE 636 W HCPCS: Performed by: UROLOGY

## 2023-03-21 PROCEDURE — 71000033 HC RECOVERY, INTIAL HOUR: Performed by: UROLOGY

## 2023-03-21 RX ORDER — ONDANSETRON 2 MG/ML
4 INJECTION INTRAMUSCULAR; INTRAVENOUS DAILY PRN
Status: DISCONTINUED | OUTPATIENT
Start: 2023-03-21 | End: 2023-03-21 | Stop reason: HOSPADM

## 2023-03-21 RX ORDER — FENTANYL CITRATE 50 UG/ML
25 INJECTION, SOLUTION INTRAMUSCULAR; INTRAVENOUS EVERY 5 MIN PRN
Status: COMPLETED | OUTPATIENT
Start: 2023-03-21 | End: 2023-03-21

## 2023-03-21 RX ORDER — ONDANSETRON 2 MG/ML
INJECTION INTRAMUSCULAR; INTRAVENOUS
Status: DISCONTINUED | OUTPATIENT
Start: 2023-03-21 | End: 2023-03-21

## 2023-03-21 RX ORDER — LIDOCAINE HYDROCHLORIDE 20 MG/ML
INJECTION INTRAVENOUS
Status: DISCONTINUED | OUTPATIENT
Start: 2023-03-21 | End: 2023-03-21

## 2023-03-21 RX ORDER — CEFAZOLIN SODIUM 2 G/50ML
2 SOLUTION INTRAVENOUS
Status: COMPLETED | OUTPATIENT
Start: 2023-03-21 | End: 2023-03-21

## 2023-03-21 RX ORDER — PHENAZOPYRIDINE HYDROCHLORIDE 200 MG/1
200 TABLET, FILM COATED ORAL 3 TIMES DAILY PRN
Qty: 15 TABLET | Refills: 0 | Status: SHIPPED | OUTPATIENT
Start: 2023-03-21 | End: 2023-05-17 | Stop reason: ALTCHOICE

## 2023-03-21 RX ORDER — PROPOFOL 10 MG/ML
VIAL (ML) INTRAVENOUS
Status: DISCONTINUED | OUTPATIENT
Start: 2023-03-21 | End: 2023-03-21

## 2023-03-21 RX ORDER — KETOROLAC TROMETHAMINE 30 MG/ML
15 INJECTION, SOLUTION INTRAMUSCULAR; INTRAVENOUS EVERY 8 HOURS PRN
Status: DISCONTINUED | OUTPATIENT
Start: 2023-03-21 | End: 2023-03-21 | Stop reason: HOSPADM

## 2023-03-21 RX ORDER — OXYCODONE AND ACETAMINOPHEN 5; 325 MG/1; MG/1
1 TABLET ORAL
Status: DISCONTINUED | OUTPATIENT
Start: 2023-03-21 | End: 2023-03-21 | Stop reason: HOSPADM

## 2023-03-21 RX ORDER — CEFDINIR 300 MG/1
300 CAPSULE ORAL 2 TIMES DAILY
Qty: 6 CAPSULE | Refills: 0 | Status: SHIPPED | OUTPATIENT
Start: 2023-03-21 | End: 2023-03-24

## 2023-03-21 RX ADMIN — KETOROLAC TROMETHAMINE 15 MG: 30 INJECTION, SOLUTION INTRAMUSCULAR; INTRAVENOUS at 11:03

## 2023-03-21 RX ADMIN — FENTANYL CITRATE 25 MCG: 50 INJECTION INTRAMUSCULAR; INTRAVENOUS at 11:03

## 2023-03-21 RX ADMIN — PROPOFOL 20 MG: 10 INJECTION, EMULSION INTRAVENOUS at 10:03

## 2023-03-21 RX ADMIN — SODIUM CHLORIDE, SODIUM LACTATE, POTASSIUM CHLORIDE, AND CALCIUM CHLORIDE: .6; .31; .03; .02 INJECTION, SOLUTION INTRAVENOUS at 10:03

## 2023-03-21 RX ADMIN — ONABOTULINUMTOXINA 100 UNITS: 100 INJECTION, POWDER, LYOPHILIZED, FOR SOLUTION INTRADERMAL; INTRAMUSCULAR at 11:03

## 2023-03-21 RX ADMIN — PROPOFOL 20 MG: 10 INJECTION, EMULSION INTRAVENOUS at 11:03

## 2023-03-21 RX ADMIN — OXYCODONE HYDROCHLORIDE AND ACETAMINOPHEN 1 TABLET: 5; 325 TABLET ORAL at 11:03

## 2023-03-21 RX ADMIN — CEFAZOLIN SODIUM 2 G: 2 SOLUTION INTRAVENOUS at 10:03

## 2023-03-21 RX ADMIN — PROPOFOL 40 MG: 10 INJECTION, EMULSION INTRAVENOUS at 10:03

## 2023-03-21 RX ADMIN — ONDANSETRON 4 MG: 2 INJECTION INTRAMUSCULAR; INTRAVENOUS at 10:03

## 2023-03-21 RX ADMIN — LIDOCAINE HYDROCHLORIDE 50 MG: 20 INJECTION INTRAVENOUS at 10:03

## 2023-03-21 NOTE — DISCHARGE SUMMARY
[facility] - Surgery (Hospital)  Discharge Note  Short Stay    Procedure(s) (LRB):  CYSTOSCOPY,WITH BOTULINUM TOXIN INJECTION (N/A)      OUTCOME: Patient tolerated treatment/procedure well without complication and is now ready for discharge.    DISPOSITION: Home or Self Care    FINAL DIAGNOSIS:  <principal problem not specified>    FOLLOWUP: In clinic    DISCHARGE INSTRUCTIONS:    Discharge Procedure Orders   Diet Adult Regular     Notify your health care provider if you experience any of the following:  severe uncontrolled pain     Notify your health care provider if you experience any of the following:  persistent nausea and vomiting or diarrhea     Notify your health care provider if you experience any of the following:  temperature >100.4     Activity as tolerated        TIME SPENT ON DISCHARGE: 5 minutes

## 2023-03-21 NOTE — ANESTHESIA PREPROCEDURE EVALUATION
03/21/2023  Aston Avendano is a 83 y.o., male.    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    Hyperlipidemia    Cardiac pacemaker    HTN (hypertension), benign    Benign prostatic hyperplasia without lower urinary tract symptoms    GERD (gastroesophageal reflux disease)    Chronic back pain greater than 3 months duration    Abdominal aortic atherosclerosis - seen on AAA screening scan    Glaucoma (increased eye pressure)    Insufficiency of tear film of both eyes    Trichiasis of right eyelid    Bilateral carotid artery stenosis    Closed fracture of right thumb    Chronic anemia    RLS (restless legs syndrome)    Idiopathic peripheral neuropathy    Heart failure, diastolic, chronic    OAB (overactive bladder)    Chronic idiopathic constipation       Pre-op Assessment    I have reviewed the Patient Summary Reports.    I have reviewed the NPO Status.   I have reviewed the Medications.     Review of Systems  Anesthesia Hx:  No problems with previous Anesthesia    Social:  Non-Smoker    Hematology/Oncology:  Hematology Normal        Cardiovascular:   Pacemaker Hypertension ECG has been reviewed. Cardiac syncope History of carotid artery stenosis    Pulmonary:  Pulmonary Normal    Renal/:   BPH    Hepatic/GI:   GERD    Musculoskeletal:   Arthritis     Neurological:   Peripheral Neuropathy    Endocrine:  Endocrine Normal        Physical Exam  General: Well nourished    Airway:  Mallampati: II   Mouth Opening: Normal  TM Distance: Normal  Neck ROM: Normal ROM    Dental:  Intact, Partial Dentures        Anesthesia Plan  Type of Anesthesia, risks & benefits discussed:    Anesthesia Type: MAC  Intra-op Monitoring Plan: Standard ASA Monitors  Post Op Pain Control Plan: multimodal analgesia  Induction:   IV  Airway Plan: , Post-Induction  Informed Consent: Informed consent signed with the Patient and all parties understand the risks and agree with anesthesia plan.  All questions answered.   ASA Score: 3    Ready For Surgery From Anesthesia Perspective.     .      Chemistry        Component Value Date/Time     03/15/2023 1256    K 4.5 03/15/2023 1256     03/15/2023 1256    CO2 22 (L) 03/15/2023 1256    BUN 24 (H) 03/15/2023 1256    CREATININE 0.9 03/15/2023 1256    GLU 89 03/15/2023 1256        Component Value Date/Time    CALCIUM 9.5 03/15/2023 1256    ALKPHOS 65 03/15/2023 1256    AST 25 03/15/2023 1256    ALT 29 03/15/2023 1256    BILITOT 0.5 03/15/2023 1256    ESTGFRAFRICA >60.0 07/28/2020 1523    EGFRNONAA >60.0 07/28/2020 1523        Lab Results   Component Value Date    WBC 4.35 03/15/2023    HGB 12.1 (L) 03/15/2023    HCT 37.1 (L) 03/15/2023    MCV 91 03/15/2023     03/15/2023         AV dual-paced rhythm   Abnormal ECG   When compared with ECG of 02-JAN-2019 08:40,   Electronic ventricular pacemaker has replaced Sinus rhythm   Confirmed by VINNY TSE MD ([id number]) on 3/15/2023 7:33:09 PM       Pharm Stress 9/17/12:    Nuclear Quantitative Functional Analysis:   LVEF: 55 %     Impression: NORMAL MYOCARDIAL PERFUSION   1. The perfusion scan is free of evidence for myocardial ischemia or injury.   2. Resting wall motion is physiologic.   3. Resting LV function is normal.   4. The ventricular volumes are normal at rest and stress.   5. The extracardiac distribution of radioactivity is normal.

## 2023-03-21 NOTE — OP NOTE
Date of Procedure: 03/21/2023    PREOP DIAGNOSIS:  Urge incontinence.    POSTOP DIAGNOSIS:  Urge incontinence.    PROCEDURES:      1. Cystoscopy with injection of botox.    2. 100 units of botox    SURGEON:  Joshua Estrada M.D.    Assistants: None    Specimen: None    ANESTHESIA:  MAC.    BLOOD LOSS:  None.    FINDINGS:  20 injections given for a total of 100 units of botox, cystoscopy unremarkable.    PROCEDURE IN DETAIL:  Patient was brought to the operative suite and placed under general anesthesia and positioned into the dorsal lithotomy position.  After being sterilely prepped and draped a 21 [place] sheath cystoscope was inserted into a normal urethra.  Bladder was examined, no mucosal abnormalities.  Bilateral ureteral orifices are normal in size, shape, caliber and location.  Otherwise unremarkable cystoscopic examination.  We then began injections of botox, for a total of 20 injections.  These were divided into 5 vertical rows of 4 injections a piece.  Lateral rows were lateral to each ureteral orifice.  0.5 ml within each injection.  At the conclusion at total of 100 units of botox was injected within the bladder.  We then emptied the bladder and there was no evidence of bleeding, hemostasis was assessed and maintained.  Ureteral orifices were well away from injection sites.  Patient was transferred to the PACU in stable condition.  Patient will return in 1 month with a pvr to assess and possibly schedule another treatment.    COMPLICATIONS: None

## 2023-03-21 NOTE — ANESTHESIA POSTPROCEDURE EVALUATION
Anesthesia Post Evaluation    Patient: Aston Avendano    Procedure(s) Performed: Procedure(s) (LRB):  CYSTOSCOPY,WITH BOTULINUM TOXIN INJECTION (N/A)    Final Anesthesia Type: MAC      Patient location during evaluation: PACU  Patient participation: Yes- Able to Participate  Level of consciousness: awake and alert  Post-procedure vital signs: reviewed and stable  Airway patency: patent      Anesthetic complications: no      Cardiovascular status: blood pressure returned to baseline  Respiratory status: unassisted and spontaneous ventilation  Hydration status: euvolemic  Follow-up not needed.          Vitals Value Taken Time   /63 03/21/23 1147   Temp 36.7 °C (98 °F) 03/21/23 1145   Pulse 60 03/21/23 1158   Resp 17 03/21/23 1155   SpO2 92 % 03/21/23 1158   Vitals shown include unvalidated device data.      Event Time   Out of Recovery 11:56:50         Pain/Gricel Score: Pain Rating Prior to Med Admin: 7 (3/21/2023 11:55 AM)  Gricel Score: 10 (3/21/2023 11:45 AM)

## 2023-03-23 VITALS
HEIGHT: 70 IN | BODY MASS INDEX: 26.55 KG/M2 | DIASTOLIC BLOOD PRESSURE: 63 MMHG | TEMPERATURE: 98 F | OXYGEN SATURATION: 98 % | HEART RATE: 53 BPM | RESPIRATION RATE: 15 BRPM | SYSTOLIC BLOOD PRESSURE: 144 MMHG | WEIGHT: 185.44 LBS

## 2023-04-26 ENCOUNTER — OFFICE VISIT (OUTPATIENT)
Dept: UROLOGY | Facility: CLINIC | Age: 83
End: 2023-04-26
Payer: MEDICARE

## 2023-04-26 VITALS — WEIGHT: 182.13 LBS | BODY MASS INDEX: 26.07 KG/M2 | HEIGHT: 70 IN

## 2023-04-26 DIAGNOSIS — N32.81 OAB (OVERACTIVE BLADDER): ICD-10-CM

## 2023-04-26 DIAGNOSIS — N40.0 BENIGN PROSTATIC HYPERPLASIA WITHOUT LOWER URINARY TRACT SYMPTOMS: Primary | ICD-10-CM

## 2023-04-26 PROCEDURE — 99999 PR PBB SHADOW E&M-EST. PATIENT-LVL IV: ICD-10-PCS | Mod: PBBFAC,,, | Performed by: UROLOGY

## 2023-04-26 PROCEDURE — 99214 OFFICE O/P EST MOD 30 MIN: CPT | Mod: PBBFAC | Performed by: UROLOGY

## 2023-04-26 PROCEDURE — 99024 PR POST-OP FOLLOW-UP VISIT: ICD-10-PCS | Mod: POP,,, | Performed by: UROLOGY

## 2023-04-26 PROCEDURE — 99024 POSTOP FOLLOW-UP VISIT: CPT | Mod: POP,,, | Performed by: UROLOGY

## 2023-04-26 PROCEDURE — 99999 PR PBB SHADOW E&M-EST. PATIENT-LVL IV: CPT | Mod: PBBFAC,,, | Performed by: UROLOGY

## 2023-04-26 NOTE — PROGRESS NOTES
Chief Complaint:   Encounter Diagnoses   Name Primary?    Benign prostatic hyperplasia without lower urinary tract symptoms Yes    OAB (overactive bladder)        HPI:   4/26/23- returns today for follow-up Botox, with no significant change in symptoms.  Primarily nocturia over daytime frequency and urgency.  No other urological symptoms.        12/30/22- patient comes in today from [facility] to discuss surgical options.  States that he has definite increased frequency and urgency with occasional urge incontinence, despite being on tamsulosin, finasteride and Detrol 4 mg.  No evidence of dysuria, no gross hematuria, does have a history of smoking.  He also states that he has a weak stream.      12/8/22- LR- Patient is an 82-year-old male that is presenting with an increase in nocturia, up to 4 times nightly and daytime frequency.  Patient states that the daytime frequency and urge incontinence is to the point where he can not leave his house without fear of incontinence.  Patient is currently on tamsulosin, finasteride and Detrol.  Denies gross hematuria.  Urine in clinic was negative in PVR was 45 mL.  States that he has taken Myrbetriq, in the past, with no resolved to symptoms.    Allergies:  Codeine and Oxycodone    Medications:  has a current medication list which includes the following prescription(s): amlodipine, aspirin, baclofen, brimonidine 0.15 % opth drop, buprenorphine, calcium-vitamins b6-b12-fa, cephalexin, cholecalciferol (vitamin d3), diosmin complex no.1, finasteride, gabapentin, hydrochlorothiazide, hypromellose, ibuprofen, lactulose, losartan, moxifloxacin, multivit-min/fa/lycopen/lutein, ofloxacin, omega 3-dha-epa-fish oil, peg 400-propylene glycol (pf), trulance, simvastatin, tamsulosin, and tizanidine.    Review of Systems:  General: No fever, chills, fatigability, or weight loss.  Skin: No rashes, itching, or changes in color or texture of skin.  Chest: Denies ABEBE, cyanosis, wheezing, cough, and  sputum production.  Abdomen: Appetite fine. No weight loss. Denies diarrhea, abdominal pain, hematemesis, or blood in stool.  Musculoskeletal: No joint stiffness or swelling. Denies back pain.  : As above.  All other review of systems negative.    PMH:   has a past medical history of Arthritis, Cardiac syncope (2011), Colon cancer screening (12/5/2016), Glaucoma, History of carotid artery stenosis (9/26/2013), and Hypertension.    PSH:   has a past surgical history that includes Cataract extraction w/  intraocular lens implant (OD 1/5/12); Cataract extraction w/  intraocular lens implant (OS date unknown); Cardiac pacemaker placement; Lumbar fusion (2013); Carotid endarterectomy; Rhizotomy w/ radiofrequency ablation (2010); Cardiac pacemaker placement; and Colonoscopy (N/A, 12/5/2016).    FamHx: family history includes Hypertension in his father and mother.    SocHx:  reports that he quit smoking about 16 years ago. He has a 12.50 pack-year smoking history. He has never used smokeless tobacco. He reports current alcohol use. He reports that he does not use drugs.      Physical Exam:  Vitals:    02/15/23 1317   BP: (!) 159/70   Pulse: 60     General: A&Ox3, no apparent distress, no deformities  Neck: No masses, normal ROM  Lungs: normal inspiration, no use of accessory muscles  Heart: normal pulse, no arrhythmias  Abdomen: Soft, NT, ND, no masses, no hernias, no hepatosplenomegaly  Skin: The skin is warm and dry. No jaundice.  Ext: No c/c/e.  : 2/23- Test desc bandar, no abnormalities of epididymus. Normal penile and scrotal skin. Meatus normal.    Labs/Studies:   Pvr 47 ml 2/23  Cystoscopy lateral lobe coaptation, significant debris, global erythema 2/23    Impression/Plan:       1. BPH- tamsulosin and finasteride appear to be controlling his flow, biggest complaint has been urgency and nocturia.    2. Urge incontinence-  botox 3/21/23    No significant change after doing the Botox, patient feels like he is  emptying his bladder.  Again biggest complaints are urgency and nocturia.  Previous cystoscopic findings did demonstrate lateral coaptation with debris.  I will see him back in 2 months with a uroflow and postvoid residual to see if there is some improvement after giving the Botox sufficient time to work.  If not and still bothersome, he could be a candidate for TURP, will evaluate with the above-stated diagnostic procedures.  Call with any worsening symptoms prior to the next appointment.

## 2023-05-17 ENCOUNTER — TELEPHONE (OUTPATIENT)
Dept: GASTROENTEROLOGY | Facility: CLINIC | Age: 83
End: 2023-05-17
Payer: MEDICARE

## 2023-06-22 NOTE — PROGRESS NOTES
"HPI     Pt saw Dr Vaughan last week- they pulled lashes and said he had an   infection- gave rx for antibiotic ointment   Pt is unsure of the name using it QID was told to use it for 5 days then   use it at night.  Pt had back prcdr- Friday, then it blocked his kidneys and he couldn't   urinate for 8 hrs   Left eye: "like I have hot sand in it" Using one bottle of Systane per day   - has trouble using PF drops due to carpal tunnel.   Stated that he stopped wearing the bandage contact quite a while ago.    COAG  SLT OS 12/28/15    PCIOL OD 1/5/12  ACIOL OS    H/O Trichiasis  H/O Floaters OU  Radioablation 9-1-2012 and 9-12-13   Pt failed with Restasis in the past   Symblepharon OU     RX antibiotic ointment OU QID x last week from Dr Vaughan   Pt unsure of name   OU: Systane - uses 50 x's daily "twist cap"  Oasis every now and then   Travatan z qhs OU    Derm- Dr Mandujano at [facility]    Last edited by Oliver Carlson MD on 5/7/2018 10:19 AM. (History)            Assessment /Plan     For exam results, see Encounter Report.      ICD-10-CM ICD-9-CM    1. Dry eye syndrome, bilateral H04.123 375.15 Uncontrolled OS>OD  Using Systane Ultra q 1 h ( not PF)   2. Primary open angle glaucoma of both eyes, mild stage H40.1131 365.11 iop not checked today due to the mucoid discharge     365.71    3. 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   Trial of oasis 4-6 times per day  Systane Ultra as 4 times per day unless using PF  Ointment q hs OS  Stop Travatan and replace it with Zioptan  Consider bandage CL  Return to clinic 3 weeks              " VOICEMAIL  1. Caller Name: Jesus Reynoso                        Call Back Number: [phone]     2. Message: Patient left vm stating that he is waiting on the Escitalopram to be placed at the pharmacy.     3. Patient approves office to leave a detailed voicemail/MyChart message: N\A

## 2023-07-18 ENCOUNTER — TELEPHONE (OUTPATIENT)
Dept: UROLOGY | Facility: CLINIC | Age: 83
End: 2023-07-18
Payer: MEDICARE

## 2023-07-18 NOTE — TELEPHONE ENCOUNTER
I spoke with patient and I rescheduled his appointment, because he is having an increase of frequency after surgery.    Loraine Milanes RN

## 2023-07-21 ENCOUNTER — OFFICE VISIT (OUTPATIENT)
Dept: UROLOGY | Facility: CLINIC | Age: 83
End: 2023-07-21
Payer: MEDICARE

## 2023-07-21 VITALS — HEIGHT: 70 IN | BODY MASS INDEX: 25.02 KG/M2 | DIASTOLIC BLOOD PRESSURE: 61 MMHG | SYSTOLIC BLOOD PRESSURE: 99 MMHG

## 2023-07-21 DIAGNOSIS — N32.81 OAB (OVERACTIVE BLADDER): ICD-10-CM

## 2023-07-21 DIAGNOSIS — N40.0 BENIGN PROSTATIC HYPERPLASIA WITHOUT LOWER URINARY TRACT SYMPTOMS: Primary | ICD-10-CM

## 2023-07-21 PROCEDURE — 99214 PR OFFICE/OUTPT VISIT, EST, LEVL IV, 30-39 MIN: ICD-10-PCS | Mod: S$PBB,,, | Performed by: UROLOGY

## 2023-07-21 PROCEDURE — 99214 OFFICE O/P EST MOD 30 MIN: CPT | Mod: PBBFAC | Performed by: UROLOGY

## 2023-07-21 PROCEDURE — 99999 PR PBB SHADOW E&M-EST. PATIENT-LVL IV: ICD-10-PCS | Mod: PBBFAC,,, | Performed by: UROLOGY

## 2023-07-21 PROCEDURE — 99214 OFFICE O/P EST MOD 30 MIN: CPT | Mod: S$PBB,,, | Performed by: UROLOGY

## 2023-07-21 PROCEDURE — 99999 PR PBB SHADOW E&M-EST. PATIENT-LVL IV: CPT | Mod: PBBFAC,,, | Performed by: UROLOGY

## 2023-07-21 RX ORDER — SOLIFENACIN SUCCINATE 10 MG/1
10 TABLET, FILM COATED ORAL DAILY
Qty: 30 TABLET | Refills: 11 | Status: SHIPPED | OUTPATIENT
Start: 2023-07-21 | End: 2023-09-22

## 2023-07-21 NOTE — PROGRESS NOTES
Chief Complaint:   Encounter Diagnoses   Name Primary?    Benign prostatic hyperplasia without lower urinary tract symptoms Yes    OAB (overactive bladder)        HPI:   7/21/23- patient is now 4 months out from Botox, still with no evidence of improvement.  States that his flow is good it is just the urgency which is an issue, no dysuria or hematuria.        12/30/22- patient comes in today from [facility] to discuss surgical options.  States that he has definite increased frequency and urgency with occasional urge incontinence, despite being on tamsulosin, finasteride and Detrol 4 mg.  No evidence of dysuria, no gross hematuria, does have a history of smoking.  He also states that he has a weak stream.      12/8/22- LR- Patient is an 82-year-old male that is presenting with an increase in nocturia, up to 4 times nightly and daytime frequency.  Patient states that the daytime frequency and urge incontinence is to the point where he can not leave his house without fear of incontinence.  Patient is currently on tamsulosin, finasteride and Detrol.  Denies gross hematuria.  Urine in clinic was negative in PVR was 45 mL.  States that he has taken Myrbetriq, in the past, with no resolved to symptoms.    Allergies:  Codeine and Oxycodone    Medications:  has a current medication list which includes the following prescription(s): amlodipine, aspirin, baclofen, brimonidine 0.15 % opth drop, buprenorphine, calcium-vitamins b6-b12-fa, cephalexin, cholecalciferol (vitamin d3), diosmin complex no.1, finasteride, gabapentin, hydrochlorothiazide, hypromellose, ibuprofen, lactulose, losartan, moxifloxacin, multivit-min/fa/lycopen/lutein, ofloxacin, omega 3-dha-epa-fish oil, peg 400-propylene glycol (pf), trulance, simvastatin, tamsulosin, and tizanidine.    Review of Systems:  General: No fever, chills, fatigability, or weight loss.  Skin: No rashes, itching, or changes in color or texture of skin.  Chest: Denies ABEBE, cyanosis,  wheezing, cough, and sputum production.  Abdomen: Appetite fine. No weight loss. Denies diarrhea, abdominal pain, hematemesis, or blood in stool.  Musculoskeletal: No joint stiffness or swelling. Denies back pain.  : As above.  All other review of systems negative.    PMH:   has a past medical history of Arthritis, Cardiac syncope (2011), Colon cancer screening (12/5/2016), Glaucoma, History of carotid artery stenosis (9/26/2013), and Hypertension.    PSH:   has a past surgical history that includes Cataract extraction w/  intraocular lens implant (OD 1/5/12); Cataract extraction w/  intraocular lens implant (OS date unknown); Cardiac pacemaker placement; Lumbar fusion (2013); Carotid endarterectomy; Rhizotomy w/ radiofrequency ablation (2010); Cardiac pacemaker placement; and Colonoscopy (N/A, 12/5/2016).    FamHx: family history includes Hypertension in his father and mother.    SocHx:  reports that he quit smoking about 16 years ago. He has a 12.50 pack-year smoking history. He has never used smokeless tobacco. He reports current alcohol use. He reports that he does not use drugs.      Physical Exam:  Vitals:    02/15/23 1317   BP: (!) 159/70   Pulse: 60     General: A&Ox3, no apparent distress, no deformities  Neck: No masses, normal ROM  Lungs: normal inspiration, no use of accessory muscles  Heart: normal pulse, no arrhythmias  Abdomen: Soft, NT, ND, no masses, no hernias, no hepatosplenomegaly  Skin: The skin is warm and dry. No jaundice.  Ext: No c/c/e.  : 2/23- Test desc bandar, no abnormalities of epididymus. Normal penile and scrotal skin. Meatus normal.    Labs/Studies:   Pvr 47 ml 2/23  Cystoscopy lateral lobe coaptation, significant debris, global erythema 2/23    Impression/Plan:       1. BPH- tamsulosin and finasteride appear to be controlling his flow, biggest complaint has been urgency and nocturia.    2. Urge incontinence-  botox 3/21/23    Despite being 4 months out from Botox, still no  significant improvement.  Will go ahead and add VESIcare 10 mg, I have informed him this may cause dry mouth, dry eyes, urinary retention and constipation.  If he has any issues he is to stop the medication and contact our office.  I will see him back in 2 months with a uroflow and postvoid residual.  Again we discussed possible TURP, but biggest complaints appears to be overactivity.  Due to findings on cystoscopy though, this may be another option if there is no improvement with adding the VESIcare to the tail end of his Botox.

## 2023-08-01 ENCOUNTER — TELEPHONE (OUTPATIENT)
Dept: UROLOGY | Facility: CLINIC | Age: 83
End: 2023-08-01
Payer: MEDICARE

## 2023-08-01 NOTE — TELEPHONE ENCOUNTER
I spoke with patient, he refers nocturia and he is worried. I notified Dr Estrada already.     Loraine Milanes RN     ----- Message from Carmen Varma RN sent at 8/1/2023 10:11 AM CDT -----  Contact: [phone]  Pt requested callback from you  ----- Message -----  From: Ervin Ortega MA  Sent: 8/1/2023   8:29 AM CDT  To: Sean Lewis Staff    Patient called                In regards to returning a phone call to speak back with staff.              Call back [phone]

## 2023-08-02 ENCOUNTER — PATIENT MESSAGE (OUTPATIENT)
Dept: UROLOGY | Facility: CLINIC | Age: 83
End: 2023-08-02
Payer: MEDICARE

## 2023-08-25 ENCOUNTER — TELEPHONE (OUTPATIENT)
Dept: UROLOGY | Facility: CLINIC | Age: 83
End: 2023-08-25
Payer: MEDICARE

## 2023-08-25 NOTE — TELEPHONE ENCOUNTER
----- Message from Joslyn Agudelo sent at 8/25/2023  1:14 PM CDT -----  Contact: Aston  Pt requests Alyssia give him a call back at [phone].    Thanks  TS

## 2023-08-25 NOTE — TELEPHONE ENCOUNTER
Spoke with pt and he stated he just wanted to touch base with Dr. Estrada and let him know that the medication that was prescribed isn't doing anything for him and he is not able to hold his urine back, having a lot of incontinence. Wants to know if there is something else that he can try because the medication is very expensive and not really doing much to start with. Advised I will pass along to Dr. Estrada. Pt v/u

## 2023-08-28 ENCOUNTER — TELEPHONE (OUTPATIENT)
Dept: UROLOGY | Facility: CLINIC | Age: 83
End: 2023-08-28
Payer: MEDICARE

## 2023-08-28 NOTE — TELEPHONE ENCOUNTER
"Per Dr. Estrada:  "At this point not really, need him to come in with a uroflow and PVR to get a better idea of where to go from here, most likely will need another procedure."    LVM for pt to return call.  "

## 2023-08-28 NOTE — TELEPHONE ENCOUNTER
Message left for pt to return call     ----- Message from Christine Andrade sent at 8/28/2023  9:14 AM CDT -----  Regarding: pt call back  Name of Who is Calling:JAIME LIMA [[id number]]          What is the request in detail: pt states someone called him please advise           Can the clinic reply by [facility]: no           [facility] [phone] (home)

## 2023-08-29 ENCOUNTER — TELEPHONE (OUTPATIENT)
Dept: UROLOGY | Facility: CLINIC | Age: 83
End: 2023-08-29
Payer: MEDICARE

## 2023-08-29 NOTE — TELEPHONE ENCOUNTER
I returned the call to the patient twice and he don't answer, no VM available.     Loraine Milanes RN     ----- Message from Jami Michelle MA sent at 8/25/2023  1:30 PM CDT -----  Contact: Aston savage,Patient requesting to speak with you  ----- Message -----  From: Joslyn Agudelo  Sent: 8/25/2023   1:15 PM CDT  To: Sean Lewis Staff    Pt requests Alyssia give him a call back at [phone].    Thanks  TS

## 2023-08-29 NOTE — TELEPHONE ENCOUNTER
Message left for pt to return call.       ----- Message from Rosalind Mandujano sent at 8/29/2023  4:17 PM CDT -----  Contact: Aston  Type:  Patient Returning Call    Who Called:Aston  Who Left Message for Patient:Milanes Flores, Loraine, RN   Does the patient know what this is regarding?:no  Would the patient rather a call back or a response via [facility]? Call back  Best Call Back Number:[phone]  Additional Information: pt states he couldn't get out shower quick enough, but he is close to his home phone and would prefer this number be called          Thanks  EMMA

## 2023-09-22 ENCOUNTER — OFFICE VISIT (OUTPATIENT)
Dept: UROLOGY | Facility: CLINIC | Age: 83
End: 2023-09-22
Payer: MEDICARE

## 2023-09-22 VITALS — HEART RATE: 60 BPM | TEMPERATURE: 97 F | SYSTOLIC BLOOD PRESSURE: 116 MMHG | DIASTOLIC BLOOD PRESSURE: 58 MMHG

## 2023-09-22 DIAGNOSIS — N40.0 BENIGN PROSTATIC HYPERPLASIA WITHOUT LOWER URINARY TRACT SYMPTOMS: Primary | ICD-10-CM

## 2023-09-22 DIAGNOSIS — N32.81 OAB (OVERACTIVE BLADDER): ICD-10-CM

## 2023-09-22 PROCEDURE — 99999 PR PBB SHADOW E&M-EST. PATIENT-LVL IV: CPT | Mod: PBBFAC,,, | Performed by: UROLOGY

## 2023-09-22 PROCEDURE — 87086 URINE CULTURE/COLONY COUNT: CPT | Performed by: UROLOGY

## 2023-09-22 PROCEDURE — 88112 CYTOPATH CELL ENHANCE TECH: CPT | Mod: 26,,, | Performed by: PATHOLOGY

## 2023-09-22 PROCEDURE — 88112 PR  CYTOPATH, CELL ENHANCE TECH: ICD-10-PCS | Mod: 26,,, | Performed by: PATHOLOGY

## 2023-09-22 PROCEDURE — 99214 PR OFFICE/OUTPT VISIT, EST, LEVL IV, 30-39 MIN: ICD-10-PCS | Mod: S$PBB,,, | Performed by: UROLOGY

## 2023-09-22 PROCEDURE — 88112 CYTOPATH CELL ENHANCE TECH: CPT | Performed by: PATHOLOGY

## 2023-09-22 PROCEDURE — 99214 OFFICE O/P EST MOD 30 MIN: CPT | Mod: PBBFAC | Performed by: UROLOGY

## 2023-09-22 PROCEDURE — 99214 OFFICE O/P EST MOD 30 MIN: CPT | Mod: S$PBB,,, | Performed by: UROLOGY

## 2023-09-22 PROCEDURE — 99999 PR PBB SHADOW E&M-EST. PATIENT-LVL IV: ICD-10-PCS | Mod: PBBFAC,,, | Performed by: UROLOGY

## 2023-09-22 RX ORDER — OXYBUTYNIN CHLORIDE 5 MG/1
5 TABLET ORAL 3 TIMES DAILY
Qty: 90 TABLET | Refills: 11 | Status: SHIPPED | OUTPATIENT
Start: 2023-09-22 | End: 2023-10-31

## 2023-09-22 RX ORDER — LEVOFLOXACIN 500 MG/1
500 TABLET, FILM COATED ORAL DAILY
Qty: 10 TABLET | Refills: 0 | Status: SHIPPED | OUTPATIENT
Start: 2023-09-22 | End: 2023-10-02

## 2023-09-22 NOTE — PROGRESS NOTES
Chief Complaint:   Encounter Diagnoses   Name Primary?    Benign prostatic hyperplasia without lower urinary tract symptoms Yes    OAB (overactive bladder)        HPI:   9/22/23- VESIcare has failed to assist, can hold his bladder during the daytime better than nighttime, does have significant back problems.  No evidence of dysuria, no real evidence of stress incontinence.  States that whenever he stands up he has to void his bladder.  No evidence of gross hematuria.       12/30/22- patient comes in today from [facility] to discuss surgical options.  States that he has definite increased frequency and urgency with occasional urge incontinence, despite being on tamsulosin, finasteride and Detrol 4 mg.  No evidence of dysuria, no gross hematuria, does have a history of smoking.  He also states that he has a weak stream.      12/8/22- LR- Patient is an 82-year-old male that is presenting with an increase in nocturia, up to 4 times nightly and daytime frequency.  Patient states that the daytime frequency and urge incontinence is to the point where he can not leave his house without fear of incontinence.  Patient is currently on tamsulosin, finasteride and Detrol.  Denies gross hematuria.  Urine in clinic was negative in PVR was 45 mL.  States that he has taken Myrbetriq, in the past, with no resolved to symptoms.    Allergies:  Codeine and Oxycodone    Medications:  has a current medication list which includes the following prescription(s): amlodipine, aspirin, baclofen, brimonidine 0.15 % opth drop, buprenorphine, calcium-vitamins b6-b12-fa, cephalexin, cholecalciferol (vitamin d3), diosmin complex no.1, finasteride, gabapentin, hydrochlorothiazide, hypromellose, ibuprofen, lactulose, losartan, moxifloxacin, multivit-min/fa/lycopen/lutein, ofloxacin, omega 3-dha-epa-fish oil, peg 400-propylene glycol (pf), trulance, simvastatin, tamsulosin, and tizanidine.    Review of Systems:  General: No fever, chills, fatigability, or  weight loss.  Skin: No rashes, itching, or changes in color or texture of skin.  Chest: Denies ABEBE, cyanosis, wheezing, cough, and sputum production.  Abdomen: Appetite fine. No weight loss. Denies diarrhea, abdominal pain, hematemesis, or blood in stool.  Musculoskeletal: No joint stiffness or swelling. Denies back pain.  : As above.  All other review of systems negative.    PMH:   has a past medical history of Arthritis, Cardiac syncope (2011), Colon cancer screening (12/5/2016), Glaucoma, History of carotid artery stenosis (9/26/2013), and Hypertension.    PSH:   has a past surgical history that includes Cataract extraction w/  intraocular lens implant (OD 1/5/12); Cataract extraction w/  intraocular lens implant (OS date unknown); Cardiac pacemaker placement; Lumbar fusion (2013); Carotid endarterectomy; Rhizotomy w/ radiofrequency ablation (2010); Cardiac pacemaker placement; and Colonoscopy (N/A, 12/5/2016).    FamHx: family history includes Hypertension in his father and mother.    SocHx:  reports that he quit smoking about 16 years ago. He has a 12.50 pack-year smoking history. He has never used smokeless tobacco. He reports current alcohol use. He reports that he does not use drugs.      Physical Exam:  Vitals:    02/15/23 1317   BP: (!) 159/70   Pulse: 60     General: A&Ox3, no apparent distress, no deformities  Neck: No masses, normal ROM  Lungs: normal inspiration, no use of accessory muscles  Heart: normal pulse, no arrhythmias  Abdomen: Soft, NT, ND, no masses, no hernias, no hepatosplenomegaly  Skin: The skin is warm and dry. No jaundice.  Ext: No c/c/e.  : 2/23- Test desc bandar, no abnormalities of epididymus. Normal penile and scrotal skin. Meatus normal.    Labs/Studies:   Pvr 47 ml 2/23  Cystoscopy lateral lobe coaptation, significant debris, global erythema 2/23    Impression/Plan:       1. BPH- tamsulosin and finasteride appear to be controlling his flow, biggest complaint has been urgency and  nocturia.    2. Urge incontinence-  botox 3/21/23    Patient has failed multiple medical trials including VESIcare, myrbetriq, oxybutynin and now Botox.  States that during the daytime he can actually hold it upwards of 3-4 hours but when he stands up he has to void.  No dysuria or hematuria, nighttime is more significant with 7- 8 times per evening.  But states that he does flow well on medical management.  Due to cysto findings with erythema will treat with 10 days of levaquin and obtain a culture and cytology.  Try oxybutynin 5 mg tid and reassess in 2 months with a uroflow and pvr.  Concerned that a TURP could make this worse despite obstructive findings on cysto.  Also could be complicated by his multiple back surgeries.

## 2023-09-24 LAB
BACTERIA UR CULT: NORMAL
BACTERIA UR CULT: NORMAL

## 2023-09-25 DIAGNOSIS — N40.0 BENIGN PROSTATIC HYPERPLASIA WITHOUT LOWER URINARY TRACT SYMPTOMS: Primary | ICD-10-CM

## 2023-09-27 LAB
FINAL PATHOLOGIC DIAGNOSIS: NORMAL
Lab: NORMAL

## 2024-08-13 PROBLEM — J43.1 PANLOBULAR EMPHYSEMA: Status: ACTIVE | Noted: 2024-08-13

## 2024-10-18 PROBLEM — G99.2 MYELOPATHY IN DISEASES CLASSIFIED ELSEWHERE: Status: ACTIVE | Noted: 2024-10-18

## 2024-10-31 ENCOUNTER — OFFICE VISIT (OUTPATIENT)
Dept: PODIATRY | Facility: CLINIC | Age: 84
End: 2024-10-31
Payer: MEDICARE

## 2024-10-31 DIAGNOSIS — L90.9 FAT PAD ATROPHY OF FOOT: ICD-10-CM

## 2024-10-31 DIAGNOSIS — M76.61 ACHILLES TENDINITIS, RIGHT LEG: Primary | ICD-10-CM

## 2024-10-31 DIAGNOSIS — S90.821A BLISTER OF HEEL, RIGHT, INITIAL ENCOUNTER: ICD-10-CM

## 2024-10-31 PROCEDURE — 99213 OFFICE O/P EST LOW 20 MIN: CPT | Mod: PBBFAC | Performed by: PODIATRIST

## 2024-10-31 PROCEDURE — 99999 PR PBB SHADOW E&M-EST. PATIENT-LVL III: CPT | Mod: PBBFAC,,, | Performed by: PODIATRIST

## 2024-12-09 PROBLEM — J41.1 MUCOPURULENT CHRONIC BRONCHITIS: Status: ACTIVE | Noted: 2024-12-09

## 2025-03-04 ENCOUNTER — OFFICE VISIT (OUTPATIENT)
Dept: PODIATRY | Facility: CLINIC | Age: 85
End: 2025-03-04
Payer: MEDICARE

## 2025-03-04 DIAGNOSIS — L97.412 ULCER OF HEEL, RIGHT, WITH FAT LAYER EXPOSED: Primary | ICD-10-CM

## 2025-03-04 DIAGNOSIS — L90.9 FAT PAD ATROPHY OF FOOT: ICD-10-CM

## 2025-03-04 PROCEDURE — 87070 CULTURE OTHR SPECIMN AEROBIC: CPT | Performed by: PODIATRIST

## 2025-03-04 PROCEDURE — 11042 DBRDMT SUBQ TIS 1ST 20SQCM/<: CPT | Mod: PBBFAC | Performed by: PODIATRIST

## 2025-03-04 PROCEDURE — 87075 CULTR BACTERIA EXCEPT BLOOD: CPT | Performed by: PODIATRIST

## 2025-03-04 PROCEDURE — 99214 OFFICE O/P EST MOD 30 MIN: CPT | Mod: 25,S$PBB,, | Performed by: PODIATRIST

## 2025-03-04 PROCEDURE — 87186 SC STD MICRODIL/AGAR DIL: CPT | Performed by: PODIATRIST

## 2025-03-04 PROCEDURE — 99999 PR PBB SHADOW E&M-EST. PATIENT-LVL IV: CPT | Mod: PBBFAC,,, | Performed by: PODIATRIST

## 2025-03-04 PROCEDURE — 87077 CULTURE AEROBIC IDENTIFY: CPT | Performed by: PODIATRIST

## 2025-03-04 PROCEDURE — 11042 DBRDMT SUBQ TIS 1ST 20SQCM/<: CPT | Mod: S$PBB,,, | Performed by: PODIATRIST

## 2025-03-04 PROCEDURE — 99214 OFFICE O/P EST MOD 30 MIN: CPT | Mod: PBBFAC | Performed by: PODIATRIST

## 2025-03-04 RX ORDER — AMOXICILLIN AND CLAVULANATE POTASSIUM 875; 125 MG/1; MG/1
1 TABLET, FILM COATED ORAL 2 TIMES DAILY
Qty: 20 TABLET | Refills: 0 | Status: SHIPPED | OUTPATIENT
Start: 2025-03-04 | End: 2025-03-07

## 2025-03-04 NOTE — PROGRESS NOTES
Subjective:       Patient ID: Aston Avendano is a 85 y.o. male.    Chief Complaint: Foot Pain (Bilateral foot pain: c/o denies pain, pt is nondiabetic, pt states of right heel pain, pain increases to 6/10 when walking. )    HPI: Aston Avendano presents to the office today with increased pain to the posterior aspect of the right heel with ambulation.  Has had history of a blister previously.  States discoloration.  6/10 pain with ambulation.  Not currently on antibiotics.  Review of patient's allergies indicates:  No Known Allergies    Past Medical History:   Diagnosis Date    Arthritis     Cardiac syncope 2011    s/p pacemaker     Colon cancer screening 12/5/2016    Glaucoma     History of carotid artery stenosis 9/26/2013    No stenosis on scan done 11/2014. S/p CEA     Hypertension        Family History   Problem Relation Name Age of Onset    Hypertension Mother      Hypertension Father         Social History[1]    Past Surgical History:   Procedure Laterality Date    CARDIAC PACEMAKER PLACEMENT      CARDIAC PACEMAKER PLACEMENT      CAROTID ENDARTERECTOMY      CATARACT EXTRACTION W/  INTRAOCULAR LENS IMPLANT  OD 1/5/12    CATARACT EXTRACTION W/  INTRAOCULAR LENS IMPLANT  OS date unknown    COLONOSCOPY N/A 12/5/2016    Procedure: COLONOSCOPY;  Surgeon: Kary Kohler MD;  Location: [facility] ENDO;  Service: Endoscopy;  Laterality: N/A;    CYSTOSCOPY,WITH BOTULINUM TOXIN INJECTION N/A 3/21/2023    Procedure: CYSTOSCOPY,WITH BOTULINUM TOXIN INJECTION;  Surgeon: Joshua Estrada MD;  Location: [facility] OR;  Service: Urology;  Laterality: N/A;    LUMBAR FUSION  2013    RHIZOTOMY W/ RADIOFREQUENCY ABLATION  2010       Review of Systems       Objective:   There were no vitals taken for this visit.    X-Ray Chest PA And Lateral  Narrative: EXAMINATION:  XR CHEST PA AND LATERAL    CLINICAL HISTORY:  Encounter for other preprocedural examination    TECHNIQUE:  PA and lateral views of the chest were  performed.    COMPARISON:  12/30/2015    FINDINGS:  The cardiac and mediastinal silhouettes appear within normal limits. A cardiac pacing device is again noted.  The lungs are clear bilaterally.  Postoperative findings are again noted in the cervical and lumbar spine.  Impression: As above.  No acute findings.    Electronically signed by: Michael Pimentel MD  Date:    03/15/2023  Time:    12:57       Physical Exam   LOWER EXTREMITY PHYSICAL EXAMINATION    Vascular:  The right dorsalis pedis pulse 2/4 and the right posterior tibial pulse 2/4.  The left dorsalis pedis pulse 2/4 and posterior tibial pulse on the left is 2/4.  Capillary refill is intact.  Pedal hair growth intact.  Venous insufficiency noted to the bilateral lower extremities    Musculoskeletal: Manual Muscle Testing is 5/5 with dorsiflexion, plantar flexion, abduction, and adduction.   There is normal range of motion in the forefoot, hindfoot, and Ankle joint.    Dermatological:  Skin is thin, shiny, and atrophic.  Fat pad atrophy noted.  There is an ulceration present to the posterior aspect of the right heel.  Upon removal of the hyperkeratotic discoloration, purulent drainage is noted.  This was cultured.  Wound was flushed      Ulcer#1  Ulcer location:  Posterior right heel  Pre debridement Measurements:  Superficial callus with eschar  Post debridement Measurements :  0.3 cm x 0.4 cm by 0.1 cm  Signs of infection:  Drainage  Erythema:  Minimal  Undermining/Tunneling:  None  Drainage:  Noted  Periwound skin:  Thin  Wound Base:  Granular    Assessment:     1. Ulcer of heel, right, with fat layer exposed    2. Fat pad atrophy of foot        Plan:     Ulcer of heel, right, with fat layer exposed  -     CULTURE, ANAEROBE  -     Aerobic culture (Specify Source)  -     Ambulatory referral/consult to Home Health; Future; Expected date: 03/05/2025    Fat pad atrophy of foot    Other orders  -     amoxicillin-clavulanate 875-125mg (AUGMENTIN) 875-125 mg per  tablet; Take 1 tablet by mouth 2 (two) times daily. for 10 days  Dispense: 20 tablet; Refill: 0    Thorough discussion is had with the patient today, concerning the diagnosis, its etiology, and the treatment algorithm at present.    The wound was surgically debrided after adequate prep with alcohol and/or betadine paint. Excisional wound debridement was performed using sharp #10/#15 blade/rounded scalpel and tissue nipper, with removal of all non-viable skin and soft tissues; necrotic skin/tissue formation. The woundbase/wound bed was also debrided to encourage bleeding as to promote/stimulate healing. Debridement was excisional and included epidermal, dermal and subcutaneous tissues. Post debridement measurements are as above. Hemostasis was achieved. Patient tolerated procedure well and without complications. Local woundcare with silver alginate dressings and bandage thereafter.     Referral placed to home health to assist with dressing changes 2-3 times per week  Keep heel suspended from surfaces such as beds, chairs, and reduce friction while in shoes    Wound cultured.  Await cultures and sensitivities.  Broad-spectrum antibiotic prescribed.  Renal function within normal limits.  GFR 88, creatinine 0.78.    Follow up in 2 weeks          Future Appointments   Date Time Provider Department Center   3/18/2025  8:15 AM Susan Ivy DPM [facility]   6/10/2025 10:00 AM Artur Simpson MD [facility] [place]           [1]   Social History  Socioeconomic History    Marital status:    Tobacco Use    Smoking status: Former     Current packs/day: 0.00     Average packs/day: 0.3 packs/day for 50.0 years (12.5 ttl pk-yrs)     Types: Cigarettes     Start date: 1956     Quit date: 2006     Years since quittin.8    Smokeless tobacco: Never   Substance and Sexual Activity    Alcohol use: Yes     Comment: OCC    Drug use: No     Social Drivers of Health     Transportation Needs: No Transportation  Needs (10/6/2020)    Received from [facility]    PRAPARE - Transportation     Lack of Transportation (Medical): No     Lack of Transportation (Non-Medical): No

## 2025-03-06 ENCOUNTER — TELEPHONE (OUTPATIENT)
Dept: PODIATRY | Facility: CLINIC | Age: 85
End: 2025-03-06
Payer: MEDICARE

## 2025-03-06 NOTE — TELEPHONE ENCOUNTER
nurse states Patient is refusing bandages states he only wants a bandaid applied once a week. He has not picked up antibiotics prescribed during last visit on 3.4.25. She states she was trying to talk to him when he just walked away during the visit while trying to apply the bandage. Informed Dr. Ivy will be aware. Call ended pleasantly   ----- Message from Tahira sent at 3/6/2025  2:29 PM CST -----  Contact: Taltia with [facility]  Type:  Patient Requesting a call back Who Called: Talita with [facility] What is the call back request regarding?: pt refused wound care orders and is only requesting  a bandage be put on wounded right foot Would the patient rather a call back or a response via [facility] Call Back Number:[phone] Information:Caller states that pts wife Jenna Avendano is requesting just a bandage as well Please call for additional information

## 2025-03-07 ENCOUNTER — TELEPHONE (OUTPATIENT)
Dept: PODIATRY | Facility: CLINIC | Age: 85
End: 2025-03-07
Payer: MEDICARE

## 2025-03-07 LAB
BACTERIA SPEC AEROBE CULT: ABNORMAL
BACTERIA SPEC ANAEROBE CULT: NORMAL

## 2025-03-07 RX ORDER — DOXYCYCLINE HYCLATE 100 MG
100 TABLET ORAL 2 TIMES DAILY
Qty: 20 TABLET | Refills: 0 | Status: SHIPPED | OUTPATIENT
Start: 2025-03-07 | End: 2025-03-17

## 2025-03-07 NOTE — TELEPHONE ENCOUNTER
Attempted to call patient to discuss his bandages and antibiotic therapy.  Patient's wife answer the phone.  She states that patient, Mr. Clancy, did not want a large bandage on his foot because he thought it was unnecessary and rather have a smaller bandage.  I discussed that instructions were provided to home health appropriately and those should be followed as well.  We also discussed his antibiotic therapy as there was some bacteria growing.  This is not differentiated yet but currently, the prescribed antibiotic appears to be covering the bacteria in question.  I do encourage him to take this medication.  Patient's wife informs me that he is having surgery and will be getting antibiotics of the time of surgery and so he did not think the current antibiotics were warranted.  I discussed with her the importance of of appropriate coverage to reduce risk of worsening infections.       ----- Message from Nurse Moise sent at 3/6/2025  2:44 PM CST -----  Contact: Talita with [facility]  He is refusing the bandages, only wanting a bandaid and for them to come once a week. He has also not picked up his antibiotics from the pharmacy yet. Also no bandage was on his foot when she got to the house.;  ----- Message -----  From: Tahira Fitch  Sent: 3/6/2025   2:31 PM CST  To: Biju Davis Staff    Type:  Patient Requesting a call back Who Called: Talita with [facility] What is the call back request regarding?: pt refused wound care orders and is only requesting  a bandage be put on wounded right foot Would the patient rather a call back or a response via [facility] Call Back Number:[phone] Information:Caller states that pts wife Jenna Avendano is requesting just a bandage as well Please call for additional information

## 2025-03-18 PROCEDURE — 99285 EMERGENCY DEPT VISIT HI MDM: CPT

## 2025-03-19 ENCOUNTER — HOSPITAL ENCOUNTER (EMERGENCY)
Facility: HOSPITAL | Age: 85
Discharge: HOME | End: 2025-03-19
Attending: EMERGENCY MEDICINE
Payer: MEDICARE

## 2025-03-19 VITALS
OXYGEN SATURATION: 97 % | BODY MASS INDEX: 24.3 KG/M2 | HEIGHT: 70 IN | RESPIRATION RATE: 14 BRPM | HEART RATE: 70 BPM | SYSTOLIC BLOOD PRESSURE: 128 MMHG | TEMPERATURE: 97 F | WEIGHT: 169.75 LBS | DIASTOLIC BLOOD PRESSURE: 82 MMHG

## 2025-03-19 DIAGNOSIS — M62.838 NIGHT MUSCLE SPASMS: Primary | ICD-10-CM

## 2025-03-19 DIAGNOSIS — M54.9 BACK PAIN: ICD-10-CM

## 2025-03-19 LAB
ALBUMIN SERPL BCP-MCNC: 4.3 G/DL (ref 3.5–5.2)
ALP SERPL-CCNC: 80 U/L (ref 40–150)
ALT SERPL W/O P-5'-P-CCNC: 24 U/L (ref 10–44)
ANION GAP SERPL CALC-SCNC: 12 MMOL/L (ref 8–16)
AST SERPL-CCNC: 27 U/L (ref 10–40)
BASOPHILS # BLD AUTO: 0.03 K/UL (ref 0–0.2)
BASOPHILS NFR BLD: 0.3 % (ref 0–1.9)
BILIRUB SERPL-MCNC: 0.6 MG/DL (ref 0.1–1)
BUN SERPL-MCNC: 20 MG/DL (ref 8–23)
CALCIUM SERPL-MCNC: 9.7 MG/DL (ref 8.7–10.5)
CHLORIDE SERPL-SCNC: 106 MMOL/L (ref 95–110)
CK SERPL-CCNC: 88 U/L (ref 20–200)
CO2 SERPL-SCNC: 21 MMOL/L (ref 23–29)
CREAT SERPL-MCNC: 0.8 MG/DL (ref 0.5–1.4)
DIFFERENTIAL METHOD BLD: ABNORMAL
EOSINOPHIL # BLD AUTO: 0 K/UL (ref 0–0.5)
EOSINOPHIL NFR BLD: 0.1 % (ref 0–8)
ERYTHROCYTE [DISTWIDTH] IN BLOOD BY AUTOMATED COUNT: 13.2 % (ref 11.5–14.5)
EST. GFR  (NO RACE VARIABLE): >60 ML/MIN/1.73 M^2
GLUCOSE SERPL-MCNC: 96 MG/DL (ref 70–110)
HCT VFR BLD AUTO: 40.9 % (ref 40–54)
HGB BLD-MCNC: 13.6 G/DL (ref 14–18)
IMM GRANULOCYTES # BLD AUTO: 0.03 K/UL (ref 0–0.04)
IMM GRANULOCYTES NFR BLD AUTO: 0.3 % (ref 0–0.5)
LYMPHOCYTES # BLD AUTO: 1.9 K/UL (ref 1–4.8)
LYMPHOCYTES NFR BLD: 21.5 % (ref 18–48)
MCH RBC QN AUTO: 28.3 PG (ref 27–31)
MCHC RBC AUTO-ENTMCNC: 33.3 G/DL (ref 32–36)
MCV RBC AUTO: 85 FL (ref 82–98)
MONOCYTES # BLD AUTO: 0.7 K/UL (ref 0.3–1)
MONOCYTES NFR BLD: 7.4 % (ref 4–15)
NEUTROPHILS # BLD AUTO: 6.3 K/UL (ref 1.8–7.7)
NEUTROPHILS NFR BLD: 70.4 % (ref 38–73)
NRBC BLD-RTO: 0 /100 WBC
OHS QRS DURATION: 160 MS
OHS QTC CALCULATION: 494 MS
PLATELET # BLD AUTO: 161 K/UL (ref 150–450)
PMV BLD AUTO: 9.9 FL (ref 9.2–12.9)
POTASSIUM SERPL-SCNC: 3.7 MMOL/L (ref 3.5–5.1)
PROT SERPL-MCNC: 7 G/DL (ref 6–8.4)
RBC # BLD AUTO: 4.81 M/UL (ref 4.6–6.2)
SODIUM SERPL-SCNC: 139 MMOL/L (ref 136–145)
TROPONIN I SERPL DL<=0.01 NG/ML-MCNC: 0.02 NG/ML (ref 0–0.03)
WBC # BLD AUTO: 8.97 K/UL (ref 3.9–12.7)

## 2025-03-19 PROCEDURE — 85025 COMPLETE CBC W/AUTO DIFF WBC: CPT | Performed by: NURSE PRACTITIONER

## 2025-03-19 PROCEDURE — 93005 ELECTROCARDIOGRAM TRACING: CPT

## 2025-03-19 PROCEDURE — 25000003 PHARM REV CODE 250: Performed by: EMERGENCY MEDICINE

## 2025-03-19 PROCEDURE — 80053 COMPREHEN METABOLIC PANEL: CPT | Performed by: NURSE PRACTITIONER

## 2025-03-19 PROCEDURE — 93010 ELECTROCARDIOGRAM REPORT: CPT | Mod: ,,, | Performed by: INTERNAL MEDICINE

## 2025-03-19 PROCEDURE — 84484 ASSAY OF TROPONIN QUANT: CPT | Performed by: NURSE PRACTITIONER

## 2025-03-19 PROCEDURE — 82550 ASSAY OF CK (CPK): CPT | Performed by: NURSE PRACTITIONER

## 2025-03-19 RX ORDER — METHOCARBAMOL 500 MG/1
500 TABLET, FILM COATED ORAL 3 TIMES DAILY
Qty: 15 TABLET | Refills: 0 | Status: SHIPPED | OUTPATIENT
Start: 2025-03-19 | End: 2025-03-24

## 2025-03-19 RX ORDER — METHOCARBAMOL 500 MG/1
500 TABLET, FILM COATED ORAL
Status: COMPLETED | OUTPATIENT
Start: 2025-03-19 | End: 2025-03-19

## 2025-03-19 RX ORDER — OXYCODONE AND ACETAMINOPHEN 5; 325 MG/1; MG/1
1 TABLET ORAL
Refills: 0 | Status: COMPLETED | OUTPATIENT
Start: 2025-03-19 | End: 2025-03-19

## 2025-03-19 RX ORDER — DIAZEPAM 2 MG/1
2 TABLET ORAL
Status: COMPLETED | OUTPATIENT
Start: 2025-03-19 | End: 2025-03-19

## 2025-03-19 RX ADMIN — OXYCODONE HYDROCHLORIDE AND ACETAMINOPHEN 1 TABLET: 5; 325 TABLET ORAL at 04:03

## 2025-03-19 RX ADMIN — DIAZEPAM 2 MG: 2 TABLET ORAL at 01:03

## 2025-03-19 RX ADMIN — METHOCARBAMOL 500 MG: 500 TABLET ORAL at 05:03

## 2025-03-19 NOTE — ED PROVIDER NOTES
SCRIBE #1 NOTE: I, Avila Anderson, am scribing for, and in the presence of, Di Painter MD. I have scribed the entire note.       History     Chief Complaint   Patient presents with    Shoulder Pain    Back Pain     Spasms to bilateral shoulders and lower back, unable to sleep s/p placing pain pump 6 days ago.       Review of patient's allergies indicates:  No Known Allergies      History of Present Illness     HPI    3/19/2025, 1:20 AM  History obtained from the patient and medical records      History of Present Illness: Aston Avendano is a 85 y.o. male patient with a PMHx of arthritis, glaucoma, carotid artery stenosis, and HTN who presents to the Emergency Department for evaluation of back and shoulder spasms which began last week following a pain pump reinstallation. Pt states he's unsure of what medication the pump administers. Pt has been experiencing poor sleep with sxs. Pt takes no muscle relaxants or home pain meds and did not take many prior to the installation. Pt has a pacemaker and spinal stimulator in-place. Symptoms are constant and moderate in severity. No mitigating or exacerbating factors reported. Associated sxs include poor appetite. Patient denies any SOB, CP, numbness, fever or headache. No prior Tx specified.  No further complaints or concerns at this time.       Arrival mode: Personal Transportation    PCP: Artur Simpson MD        Past Medical History:  Past Medical History:   Diagnosis Date    Arthritis     Cardiac syncope 2011    s/p pacemaker     Colon cancer screening 12/5/2016    Glaucoma     History of carotid artery stenosis 9/26/2013    No stenosis on scan done 11/2014. S/p CEA     Hypertension        Past Surgical History:  Past Surgical History:   Procedure Laterality Date    CARDIAC PACEMAKER PLACEMENT      CARDIAC PACEMAKER PLACEMENT      CAROTID ENDARTERECTOMY      CATARACT EXTRACTION W/  INTRAOCULAR LENS IMPLANT  OD 1/5/12    CATARACT EXTRACTION W/  INTRAOCULAR LENS  IMPLANT  OS date unknown    COLONOSCOPY N/A 2016    Procedure: COLONOSCOPY;  Surgeon: Kary Kohler MD;  Location: [facility] ENDO;  Service: Endoscopy;  Laterality: N/A;    CYSTOSCOPY,WITH BOTULINUM TOXIN INJECTION N/A 3/21/2023    Procedure: CYSTOSCOPY,WITH BOTULINUM TOXIN INJECTION;  Surgeon: Joshua Estrada MD;  Location: [facility] OR;  Service: Urology;  Laterality: N/A;    LUMBAR FUSION      RHIZOTOMY W/ RADIOFREQUENCY ABLATION           Family History:  Family History   Problem Relation Name Age of Onset    Hypertension Mother      Hypertension Father         Social History:  Social History     Tobacco Use    Smoking status: Former     Current packs/day: 0.00     Average packs/day: 0.3 packs/day for 50.0 years (12.5 ttl pk-yrs)     Types: Cigarettes     Start date: 1956     Quit date: 2006     Years since quittin.8    Smokeless tobacco: Never   Substance and Sexual Activity    Alcohol use: Yes     Comment: OCC    Drug use: No    Sexual activity: Not on file        Review of Systems     Review of Systems   Constitutional:  Positive for appetite change. Negative for fever.   HENT:  Negative for sore throat.    Respiratory:  Negative for shortness of breath.    Cardiovascular:  Negative for chest pain.   Gastrointestinal:  Negative for nausea.   Genitourinary:  Negative for dysuria.   Musculoskeletal:  Positive for back pain and myalgias (BUE, BLE).   Skin:  Negative for rash.   Neurological:  Negative for weakness, numbness and headaches.        Spasms: back, extremities   Hematological:  Does not bruise/bleed easily.   Psychiatric/Behavioral:  Positive for sleep disturbance.    All other systems reviewed and are negative.       Physical Exam     Initial Vitals [25 2242]   BP Pulse Resp Temp SpO2   (!) 188/88 69 20 97.4 °F (36.3 °C) 100 %      MAP       --          Physical Exam  Nursing Notes and Vital Signs Reviewed.  Constitutional: Patient is in mild distress. Well-developed and  "well-nourished.  Head: Atraumatic. Normocephalic.  Eyes: PERRL. EOM intact. Conjunctivae are not pale. No scleral icterus.  ENT: Mucous membranes are moist. Oropharynx is clear and symmetric.    Neck: Supple. Full ROM. No lymphadenopathy.  Cardiovascular: Regular rate. Regular rhythm. No murmurs, rubs, or gallops. Distal pulses are 2+ and symmetric.  Pulmonary/Chest: No respiratory distress. Clear to auscultation bilaterally. No wheezing or rales.  Abdominal: Soft and non-distended.  There is no tenderness.  No rebound, guarding, or rigidity. Good bowel sounds.  Genitourinary: No CVA tenderness.  Musculoskeletal: Muscle spasms in all extremities. No obvious deformities. No edema. No calf tenderness.  Skin: Warm and dry.  Neurological:  Alert, awake, and appropriate.  Normal speech.  No acute focal neurological deficits are appreciated.  Psychiatric: Normal affect. Good eye contact. Appropriate in content.     ED Course   Procedures  ED Vital Signs:  Vitals:    03/18/25 2242 03/19/25 0117 03/19/25 0200 03/19/25 0300   BP: (!) 188/88  (!) 151/73 (!) 146/79   Pulse: 69  60 69   Resp: 20  10 (!) 21   Temp: 97.4 °F (36.3 °C)      TempSrc: Oral      SpO2: 100%  100% 98%   Weight: 77.3 kg (170 lb 6.4 oz) 77 kg (169 lb 12.1 oz)     Height:  5' 10" (1.778 m)      03/19/25 0330 03/19/25 0400 03/19/25 0457 03/19/25 0530   BP:  129/78  128/82   Pulse: 70 70  70   Resp: (!) 22 20 16 14   Temp:       TempSrc:       SpO2: 98% 98%  97%   Weight:       Height:           Abnormal Lab Results:  Labs Reviewed   CBC W/ AUTO DIFFERENTIAL - Abnormal       Result Value    WBC 8.97      RBC 4.81      Hemoglobin 13.6 (*)     Hematocrit 40.9      MCV 85      MCH 28.3      MCHC 33.3      RDW 13.2      Platelets 161      MPV 9.9      Immature Granulocytes 0.3      Gran # (ANC) 6.3      Immature Grans (Abs) 0.03      Lymph # 1.9      Mono # 0.7      Eos # 0.0      Baso # 0.03      nRBC 0      Gran % 70.4      Lymph % 21.5      Mono % 7.4      " Eosinophil % 0.1      Basophil % 0.3      Differential Method Automated     COMPREHENSIVE METABOLIC PANEL - Abnormal    Sodium 139      Potassium 3.7      Chloride 106      CO2 21 (*)     Glucose 96      BUN 20      Creatinine 0.8      Calcium 9.7      Total Protein 7.0      Albumin 4.3      Total Bilirubin 0.6      Alkaline Phosphatase 80      AST 27      ALT 24      eGFR >60      Anion Gap 12     TROPONIN I    Troponin I 0.020     CK    CPK 88          All Lab Results:  Results for orders placed or performed during the hospital encounter of 03/19/25   CBC auto differential    Collection Time: 03/19/25  1:12 AM   Result Value Ref Range    WBC 8.97 3.90 - 12.70 K/uL    RBC 4.81 4.60 - 6.20 M/uL    Hemoglobin 13.6 (L) 14.0 - 18.0 g/dL    Hematocrit 40.9 40.0 - 54.0 %    MCV 85 82 - 98 fL    MCH 28.3 27.0 - 31.0 pg    MCHC 33.3 32.0 - 36.0 g/dL    RDW 13.2 11.5 - 14.5 %    Platelets 161 150 - 450 K/uL    MPV 9.9 9.2 - 12.9 fL    Immature Granulocytes 0.3 0.0 - 0.5 %    Gran # (ANC) 6.3 1.8 - 7.7 K/uL    Immature Grans (Abs) 0.03 0.00 - 0.04 K/uL    Lymph # 1.9 1.0 - 4.8 K/uL    Mono # 0.7 0.3 - 1.0 K/uL    Eos # 0.0 0.0 - 0.5 K/uL    Baso # 0.03 0.00 - 0.20 K/uL    nRBC 0 0 /100 WBC    Gran % 70.4 38.0 - 73.0 %    Lymph % 21.5 18.0 - 48.0 %    Mono % 7.4 4.0 - 15.0 %    Eosinophil % 0.1 0.0 - 8.0 %    Basophil % 0.3 0.0 - 1.9 %    Differential Method Automated    Comprehensive metabolic panel    Collection Time: 03/19/25  1:12 AM   Result Value Ref Range    Sodium 139 136 - 145 mmol/L    Potassium 3.7 3.5 - 5.1 mmol/L    Chloride 106 95 - 110 mmol/L    CO2 21 (L) 23 - 29 mmol/L    Glucose 96 70 - 110 mg/dL    BUN 20 8 - 23 mg/dL    Creatinine 0.8 0.5 - 1.4 mg/dL    Calcium 9.7 8.7 - 10.5 mg/dL    Total Protein 7.0 6.0 - 8.4 g/dL    Albumin 4.3 3.5 - 5.2 g/dL    Total Bilirubin 0.6 0.1 - 1.0 mg/dL    Alkaline Phosphatase 80 40 - 150 U/L    AST 27 10 - 40 U/L    ALT 24 10 - 44 U/L    eGFR >60 >60 mL/min/1.73 m^2     Anion Gap 12 8 - 16 mmol/L   Troponin I    Collection Time: 03/19/25  1:12 AM   Result Value Ref Range    Troponin I 0.020 0.000 - 0.026 ng/mL   CPK    Collection Time: 03/19/25  1:12 AM   Result Value Ref Range    CPK 88 20 - 200 U/L   EKG 12-lead    Collection Time: 03/19/25  1:51 AM   Result Value Ref Range    QRS Duration 160 ms    [facility] QTC Calculation 494 ms       Imaging Results:  Imaging Results              X-Ray Chest 1 View (Final result)  Result time 03/19/25 08:08:12      Final result by Miah Trujillo MD (03/19/25 08:08:12)                   Impression:      No acute process seen.      Electronically signed by: Miah Trujillo MD  Date:    03/19/2025  Time:    08:08               Narrative:    EXAMINATION:  XR CHEST 1 VIEW    CLINICAL HISTORY:  Dorsalgia, unspecified    FINDINGS:  Single view of the chest.  Comparison 03/15/2023    Cardiac silhouette is normal.  The lungs demonstrate no evidence of active disease.  No evidence of pleural effusion or pneumothorax.  Bones appear intact.  Moderate degenerative changes and moderate atherosclerotic disease.  Pacing wires are unchanged.  Spine stimulator noted on current exam                                       The EKG was ordered, reviewed, and independently interpreted by the ED provider.  Interpretation time: 1:51 AM  Rate: 60 BPM  Rhythm:  Ventricular-paced rhythm  Interpretation: Abnormal EKG. No STEMI.             The Emergency Provider reviewed the vital signs and test results, which are outlined above.     ED Discussion     5:53 AM: Reassessed pt at this time. Discussed with patient and/or family/caretaker all pertinent ED information and results. Discussed pt dx and plan of tx. Gave the patient and family all f/u and return to the ED instructions. All questions and concerns were addressed at this time. Patient and/or family/caretaker expresses understanding of information and instructions, and is comfortable with plan to discharge. Pt is stable for  discharge.     I discussed with patient and/or family/caretaker that evaluation in the ED does not suggest any emergent or life threatening medical conditions requiring immediate intervention beyond what was provided in the ED, and I believe patient is safe for discharge.  Regardless, an unremarkable evaluation in the ED does not preclude the development or presence of a serious of life threatening condition. As such, I instructed that the patient is to return immediately for any worsening or change in current symptoms.           Medical Decision Making  Amount and/or Complexity of Data Reviewed  Labs: ordered. Decision-making details documented in ED Course.  Radiology: ordered. Decision-making details documented in ED Course.  ECG/medicine tests: ordered and independent interpretation performed. Decision-making details documented in ED Course.    Risk  Prescription drug management.                ED Medication(s):  Medications   diazePAM tablet 2 mg (2 mg Oral Given 3/19/25 0127)   oxyCODONE-acetaminophen 5-325 mg per tablet 1 tablet (1 tablet Oral Given 3/19/25 0457)   methocarbamoL tablet 500 mg (500 mg Oral Given 3/19/25 0549)       Discharge Medication List as of 3/19/2025  5:44 AM        START taking these medications    Details   methocarbamoL (ROBAXIN) 500 MG Tab Take 1 tablet (500 mg total) by mouth 3 (three) times daily. for 5 days, Starting Wed 3/19/2025, Until Mon 3/24/2025, Print              Follow-up Information       Artur Simpson MD In 2 days.    Specialty: Family Medicine  Contact information:  [address]  [address]  [place] [address]  [phone]               [facility] - Emergency Dept..    Specialty: Emergency Medicine  Why: As needed, If symptoms worsen  Contact information:  [address] [address] [facility] [address] [phone]                               Scribe Attestation:   Scribe #1: I performed the above scribed service and the documentation accurately  describes the services I performed. I attest to the accuracy of the note.     Attending:   Physician Attestation Statement for Scribe #1: I, Di Painter MD, personally performed the services described in this documentation, as scribed by Avila Anderson, in my presence, and it is both accurate and complete.           Clinical Impression       ICD-10-CM ICD-9-CM   1. Night muscle spasms  M62.838 728.85   2. Back pain  M54.9 724.5       Disposition:   Disposition: Discharged  Condition: Stable        Di Painter MD  03/20/25 0405

## 2025-03-19 NOTE — FIRST PROVIDER EVALUATION
Medical screening examination initiated.  I have conducted a focused provider triage encounter, findings are as follows:    Brief history of present illness:  Patient reports spasms to shoulders and back.  Patient reports recently had stimulator placed    Vitals:    03/18/25 2242   BP: (!) 188/88   Pulse: 69   Resp: 20   Temp: 97.4 °F (36.3 °C)   TempSrc: Oral   SpO2: 100%   Weight: 77.3 kg (170 lb 6.4 oz)       Pertinent physical exam:  Uncomfortable    Brief workup plan:  Labs, imaging, further eval    Preliminary workup initiated; this workup will be continued and followed by the physician or advanced practice provider that is assigned to the patient when roomed.

## 2025-03-20 ENCOUNTER — SSC ENCOUNTER (OUTPATIENT)
Dept: ADMINISTRATIVE | Facility: OTHER | Age: 85
End: 2025-03-20
Payer: MEDICARE

## 2025-03-25 ENCOUNTER — OUTPATIENT CASE MANAGEMENT (OUTPATIENT)
Dept: ADMINISTRATIVE | Facility: OTHER | Age: 85
End: 2025-03-25
Payer: MEDICARE

## 2025-03-25 NOTE — PROGRESS NOTES
"  3/25/2025    Spoke to patient, he is "not feeling well". Encouraged patient to go have ordered UA completed today. Will follow up in 1-2 days.      "

## 2025-03-27 ENCOUNTER — OUTPATIENT CASE MANAGEMENT (OUTPATIENT)
Dept: ADMINISTRATIVE | Facility: OTHER | Age: 85
End: 2025-03-27
Payer: MEDICARE

## 2025-04-03 ENCOUNTER — PATIENT OUTREACH (OUTPATIENT)
Dept: ADMINISTRATIVE | Facility: OTHER | Age: 85
End: 2025-04-03
Payer: MEDICARE

## 2025-04-03 ENCOUNTER — OUTPATIENT CASE MANAGEMENT (OUTPATIENT)
Dept: ADMINISTRATIVE | Facility: OTHER | Age: 85
End: 2025-04-03
Payer: MEDICARE

## 2025-04-03 NOTE — PROGRESS NOTES
Outpatient Care Management  Plan of Care Follow Up Visit    Patient: Aston Avendano  MRN: [id number]  Date of Service: 04/03/2025  Completed by: Cynthia Cooley RN  Referral Date:     Reason for Visit   Patient presents with    OPCM RN Follow Up Call     4/3/2025  1st attempt to complete Follow-Up  for Outpatient Care Management.  Will send via [employer] [facility] to assess letter.

## 2025-04-03 NOTE — LETTER
Aston Avendano  [address] [address] [address] [address]      Dear Aston Avendano,     I am your nurse with [facility]s Outpatient Care Management Department. I was unsuccessful in reaching you today. At your earliest convenience, I would like to discuss your healthcare progress.      Please contact me at [phone] from 8:00AM to 4:30 PM on Monday thru Friday.     As you know, [facility] is a program offered to you through [facility] where a nurse is available 24/7 to answer questions or provide medical advice, their number is [phone].    Thanks,      Cynthia Cooley RN  Outpatient Care Management

## 2025-04-07 ENCOUNTER — OFFICE VISIT (OUTPATIENT)
Dept: UROLOGY | Facility: CLINIC | Age: 85
End: 2025-04-07
Payer: MEDICARE

## 2025-04-07 ENCOUNTER — LAB VISIT (OUTPATIENT)
Dept: LAB | Facility: HOSPITAL | Age: 85
End: 2025-04-07
Attending: UROLOGY
Payer: MEDICARE

## 2025-04-07 VITALS
WEIGHT: 171.06 LBS | DIASTOLIC BLOOD PRESSURE: 76 MMHG | SYSTOLIC BLOOD PRESSURE: 145 MMHG | HEIGHT: 70 IN | BODY MASS INDEX: 24.49 KG/M2 | HEART RATE: 70 BPM

## 2025-04-07 DIAGNOSIS — N40.1 BENIGN PROSTATIC HYPERPLASIA WITH NOCTURIA: Primary | ICD-10-CM

## 2025-04-07 DIAGNOSIS — R35.1 BENIGN PROSTATIC HYPERPLASIA WITH NOCTURIA: Primary | ICD-10-CM

## 2025-04-07 DIAGNOSIS — N40.1 BENIGN PROSTATIC HYPERPLASIA WITH NOCTURIA: ICD-10-CM

## 2025-04-07 DIAGNOSIS — R35.1 BENIGN PROSTATIC HYPERPLASIA WITH NOCTURIA: ICD-10-CM

## 2025-04-07 DIAGNOSIS — N32.81 OAB (OVERACTIVE BLADDER): ICD-10-CM

## 2025-04-07 LAB — PSA SERPL-MCNC: 1.53 NG/ML

## 2025-04-07 PROCEDURE — 84153 ASSAY OF PSA TOTAL: CPT

## 2025-04-07 PROCEDURE — 99214 OFFICE O/P EST MOD 30 MIN: CPT | Mod: S$PBB,,, | Performed by: UROLOGY

## 2025-04-07 PROCEDURE — 99999 PR PBB SHADOW E&M-EST. PATIENT-LVL III: CPT | Mod: PBBFAC,,, | Performed by: UROLOGY

## 2025-04-07 PROCEDURE — 99213 OFFICE O/P EST LOW 20 MIN: CPT | Mod: PBBFAC | Performed by: UROLOGY

## 2025-04-07 PROCEDURE — 36415 COLL VENOUS BLD VENIPUNCTURE: CPT

## 2025-04-07 RX ORDER — FINASTERIDE 5 MG/1
5 TABLET, FILM COATED ORAL DAILY
Qty: 90 TABLET | Refills: 3 | Status: SHIPPED | OUTPATIENT
Start: 2025-04-07

## 2025-04-07 RX ORDER — TAMSULOSIN HYDROCHLORIDE 0.4 MG/1
1 CAPSULE ORAL 2 TIMES DAILY
Qty: 90 CAPSULE | Refills: 3 | Status: SHIPPED | OUTPATIENT
Start: 2025-04-07

## 2025-04-07 RX ORDER — TROSPIUM CHLORIDE ER 60 MG/1
60 CAPSULE ORAL DAILY
Qty: 30 CAPSULE | Refills: 5 | Status: SHIPPED | OUTPATIENT
Start: 2025-04-07 | End: 2025-10-04

## 2025-04-07 NOTE — PROGRESS NOTES
Chief Complaint:   Encounter Diagnoses   Name Primary?    Benign prostatic hyperplasia with nocturia Yes    OAB (overactive bladder)        HPI:  HPI Aston P Juan Alberto effie 85 y.o. male who presents with long history of overactive bladder and nocturia.  Nocturia as his main complaint.  He gets up 6 times a night.  He has been doing this for 10+ years.  He has seen 5 urologists.  He has had a number of medications including Detrol, VESIcare, Myrbetriq, Gemtesa.  None of these has been helpful.  He underwent intravesical Botox with 100 units without success it is in 2023.  He has had cystoscopy in the office as well as in the operating room.  His last evaluation was in December and he was placed on another medication which was not helpful.  He is maintained on tamsulosin and finasteride.  He states his stream is good.  He can go every 3 or 4 hours during the daytime.  Denies any dysuria or hematuria.    History:  Social History[1]  Past Medical History:   Diagnosis Date    Arthritis     Cardiac syncope 2011    s/p pacemaker     Colon cancer screening 12/5/2016    Glaucoma     History of carotid artery stenosis 9/26/2013    No stenosis on scan done 11/2014. S/p CEA     Hypertension      Past Surgical History:   Procedure Laterality Date    CARDIAC PACEMAKER PLACEMENT      CARDIAC PACEMAKER PLACEMENT      CAROTID ENDARTERECTOMY      CATARACT EXTRACTION W/  INTRAOCULAR LENS IMPLANT  OD 1/5/12    CATARACT EXTRACTION W/  INTRAOCULAR LENS IMPLANT  OS date unknown    COLONOSCOPY N/A 12/5/2016    Procedure: COLONOSCOPY;  Surgeon: Kary Kohler MD;  Location: [facility] ENDO;  Service: Endoscopy;  Laterality: N/A;    CYSTOSCOPY,WITH BOTULINUM TOXIN INJECTION N/A 3/21/2023    Procedure: CYSTOSCOPY,WITH BOTULINUM TOXIN INJECTION;  Surgeon: Joshua Estrada MD;  Location: [facility] OR;  Service: Urology;  Laterality: N/A;    LUMBAR FUSION  2013    RHIZOTOMY W/ RADIOFREQUENCY ABLATION  2010     Family History   Problem Relation Name Age of  "Onset    Hypertension Mother      Hypertension Father         Medications Ordered Prior to Encounter[2]     Objective:     Vitals:    25 0822   BP: (!) 145/76   BP Location: Right arm   Patient Position: Sitting   Pulse: 70   Weight: 77.6 kg (171 lb 1.2 oz)   Height: 5' 10" (1.778 m)      BMI Readings from Last 1 Encounters:   25 24.55 kg/m²          Physical Exam  Uses a rolling walker   Alert and oriented   Respirations even unlabored  Lab Results   Component Value Date    PSA 1.73 03/15/2012    PSA 1.5 2010    PSA 1.0 2009    PSA 1.0 2008    PSA 1.5 2007    PSA 1.2 2006    PSA 1.1 10/04/2004        Lab Results   Component Value Date    CREATININE 0.8 2025      Assessment:       1. Benign prostatic hyperplasia with nocturia    2. OAB (overactive bladder)        Plan:     1. Benign prostatic hyperplasia with nocturia    2. OAB (overactive bladder)       Orders Placed This Encounter    Prostate Specific Antigen, Diagnostic    trospium (SANCTURA XR) 60 mg Cp24 capsule    tamsulosin (FLOMAX) 0.4 mg Cap    finasteride (PROSCAR) 5 mg tablet    BPH with overactive bladder and nocturia.  Patient is refractory to all treatment options previously presented.  I discussed the only medication that I would consider would be trospium as it has a decreased risk of causing confusion.  I suspect this will not be helpful for him as well.  I did discuss transitioning to using a urinal at night to limit fall risk.  He requests annual follow up.  We will check baseline PSA as I do not have 1 on records since .  He understands it may be elevated due to his age.       [1]   Social History  Tobacco Use    Smoking status: Former     Current packs/day: 0.00     Average packs/day: 0.3 packs/day for 50.0 years (12.5 ttl pk-yrs)     Types: Cigarettes     Start date: 1956     Quit date: 2006     Years since quittin.9    Smokeless tobacco: Never   Substance Use Topics    Alcohol " use: Yes     Comment: OCC    Drug use: No   [2]   Current Outpatient Medications on File Prior to Visit   Medication Sig Dispense Refill    albuterol (PROVENTIL/VENTOLIN HFA) 90 mcg/actuation inhaler Inhale 2 puffs into the lungs every 6 (six) hours as needed for Wheezing. 18 g 0    brimonidine 0.15 % OPTH DROP (ALPHAGAN) 0.15 % ophthalmic solution 1 drop.      brimonidine 0.2% (ALPHAGAN) 0.2 % Drop Apply to eye.      brimonidine-timoloL (COMBIGAN) 0.2-0.5 % Drop PLACE 1 DROP INTO BOTH EYES TWICE A DAY      cyproheptadine (PERIACTIN) 4 mg tablet Take 1 tablet (4 mg total) by mouth 2 (two) times daily as needed (to stimulate appetite). 60 tablet 11    diosmin complex no.1 (VASCULERA ORAL) Take by mouth.      dorzolamide (TRUSOPT) 2 % ophthalmic solution PLACE 1 DROP INTO LEFT EYE TWICE A DAY 80 DAY SUPPLY      dorzolamide (TRUSOPT) 2 % ophthalmic solution Apply to eye.      gabapentin (NEURONTIN) 600 MG tablet TAKE 1 TABLET BY MOUTH IN THE MORNING AND 1 TABLET BEFORE BEDTIME. DO ALL THIS FOR 30 DAYS. (Patient taking differently: Take 300 mg by mouth 2 (two) times daily.)      hypromellose (SYSTANE GEL OPHT) Apply to eye.      megestroL (MEGACE) 40 MG Tab Take 5 tablets (200 mg total) by mouth 2 (two) times daily. 300 tablet 5    moxifloxacin (VIGAMOX) 0.5 % ophthalmic solution       multivit-min/FA/lycopen/lutein (CENTRUM SILVER MEN ORAL) Take by mouth.      ofloxacin (OCUFLOX) 0.3 % ophthalmic solution 1 drop 4 (four) times daily.      pantoprazole (PROTONIX) 40 MG tablet Take 1 tablet (40 mg total) by mouth once daily. 90 tablet 3    peg 400-propylene glycol, PF, (SYSTANE ULTRA/LUBRICANT EYE, PF,) 0.4-0.3 % Dpet Apply to eye.      rOPINIRole (REQUIP) 0.5 MG tablet Take 1 tablet (0.5 mg total) by mouth every evening. 90 tablet 3    simvastatin (ZOCOR) 20 MG tablet Take 1 tablet (20 mg total) by mouth every evening. 90 tablet 3    timolol maleate 0.5% (TIMOPTIC) 0.5 % Drop INSTILL 1 DROP INTO LEFT EYE TWICE A DAY       [DISCONTINUED] baclofen (LIORESAL) 10 MG tablet Take 1 tablet (10 mg total) by mouth once daily. (Patient not taking: Reported on 3/27/2025) 20 tablet 0    [DISCONTINUED] collagenase (SANTYL) ointment Place onto the skin.      [DISCONTINUED] DULoxetine (CYMBALTA) 30 MG capsule Take 1 capsule (30 mg total) by mouth once daily. (Patient not taking: Reported on 3/27/2025) 30 capsule 0    [DISCONTINUED] DULoxetine (CYMBALTA) 60 MG capsule Take 1 capsule (60 mg total) by mouth 2 (two) times daily. (Patient not taking: Reported on 3/27/2025) 60 capsule 11    [DISCONTINUED] ELIQUIS 5 mg Tab TAKE 1 TABLET BY MOUTH IN THE MORNING AND 1 TABLET BEFORE BEDTIME.      [DISCONTINUED] finasteride (PROSCAR) 5 mg tablet Take 1 tablet (5 mg total) by mouth once daily. 90 tablet 3    [DISCONTINUED] ondansetron (ZOFRAN-ODT) 4 MG TbDL Take 1 tablet (4 mg total) by mouth every 8 (eight) hours as needed (nausea/vomiting). (Patient not taking: Reported on 3/27/2025) 20 tablet 1    [DISCONTINUED] oxyCODONE-acetaminophen (PERCOCET) 7.5-325 mg per tablet TAKE 1 TABLET BY MOUTH ONCE OR TWICE DAILY AS NEEDED FOR PAIN. (Patient not taking: Reported on 3/27/2025)      [DISCONTINUED] tamsulosin (FLOMAX) 0.4 mg Cap Take 1 capsule (0.4 mg total) by mouth 2 (two) times daily. 90 capsule 3    [DISCONTINUED] vibegron (GEMTESA) 75 mg Tab Take 1 tablet (75 mg total) by mouth once daily. (Patient not taking: Reported on 3/27/2025)       No current facility-administered medications on file prior to visit.

## 2025-04-08 NOTE — PROGRESS NOTES
This Community Health Worker (CHW) completed Social Determinant of Health (SDOH)  Questionnaire with patient/caregiver via telephone today.  Notified OPCM JOSE Marie RN of completion.      Patient denied any SDOH needs at this time.

## 2025-04-15 ENCOUNTER — TELEPHONE (OUTPATIENT)
Dept: UROLOGY | Facility: CLINIC | Age: 85
End: 2025-04-15
Payer: MEDICARE

## 2025-04-15 NOTE — TELEPHONE ENCOUNTER
Returned call to pt; states the Sanctura he started taking last week had only increased his urinary frequency; requesting alternate medication. Will notify MD.

## 2025-04-16 ENCOUNTER — OUTPATIENT CASE MANAGEMENT (OUTPATIENT)
Dept: ADMINISTRATIVE | Facility: OTHER | Age: 85
End: 2025-04-16
Payer: MEDICARE

## 2025-04-16 ENCOUNTER — OFFICE VISIT (OUTPATIENT)
Dept: PODIATRY | Facility: CLINIC | Age: 85
End: 2025-04-16
Payer: MEDICARE

## 2025-04-16 DIAGNOSIS — G60.9 IDIOPATHIC PERIPHERAL NEUROPATHY: ICD-10-CM

## 2025-04-16 DIAGNOSIS — Z87.2 HISTORY OF FOOT ULCER: Primary | ICD-10-CM

## 2025-04-16 DIAGNOSIS — L90.9 FAT PAD ATROPHY OF FOOT: ICD-10-CM

## 2025-04-16 PROCEDURE — 99999 PR PBB SHADOW E&M-EST. PATIENT-LVL III: CPT | Mod: PBBFAC,,, | Performed by: PODIATRIST

## 2025-04-16 PROCEDURE — 99213 OFFICE O/P EST LOW 20 MIN: CPT | Mod: PBBFAC | Performed by: PODIATRIST

## 2025-04-16 NOTE — PROGRESS NOTES
Subjective:       Patient ID: Aston Avendano is a 85 y.o. male.    Chief Complaint: Foot Ulcer (Right heel ulcer: c/o pain rate 7/10, pt is nondiabetic, pt states of pain in both feet but right is worse than left. No draining or swelling at present )    HPI: Aston Avendano presents to the office today with increased pain to the posterior aspect of the right heel with ambulation.  Has had previous wound at this location.  States 7/10 pain to the bilateral feet.  States burning and stinging sensations.  Does have history of multiple back surgeries.  Reports that home health was assisting in performing dressing changes, however he states that home health agency was not being helpful.  Presents to clinic today by himself.  Does complain of chronic pain and is establish with pain management.    Review of patient's allergies indicates:  No Known Allergies    Past Medical History:   Diagnosis Date    Arthritis     Cardiac syncope 2011    s/p pacemaker     Colon cancer screening 12/5/2016    Glaucoma     History of carotid artery stenosis 9/26/2013    No stenosis on scan done 11/2014. S/p CEA     Hypertension        Family History   Problem Relation Name Age of Onset    Hypertension Mother      Hypertension Father         Social History[1]    Past Surgical History:   Procedure Laterality Date    CARDIAC PACEMAKER PLACEMENT      CARDIAC PACEMAKER PLACEMENT      CAROTID ENDARTERECTOMY      CATARACT EXTRACTION W/  INTRAOCULAR LENS IMPLANT  OD 1/5/12    CATARACT EXTRACTION W/  INTRAOCULAR LENS IMPLANT  OS date unknown    COLONOSCOPY N/A 12/5/2016    Procedure: COLONOSCOPY;  Surgeon: Kary Kohler MD;  Location: [facility] [place] ENDO;  Service: Endoscopy;  Laterality: N/A;    CYSTOSCOPY,WITH BOTULINUM TOXIN INJECTION N/A 3/21/2023    Procedure: CYSTOSCOPY,WITH BOTULINUM TOXIN INJECTION;  Surgeon: Joshua Estrada MD;  Location: [facility] [place] OR;  Service: Urology;  Laterality: N/A;    LUMBAR FUSION  2013    RHIZOTOMY W/ RADIOFREQUENCY ABLATION   2010       Review of Systems       Objective:   There were no vitals taken for this visit.    X-Ray Chest 1 View  Narrative: EXAMINATION:  XR CHEST 1 VIEW    CLINICAL HISTORY:  Dorsalgia, unspecified    FINDINGS:  Single view of the chest.  Comparison 03/15/2023    Cardiac silhouette is normal.  The lungs demonstrate no evidence of active disease.  No evidence of pleural effusion or pneumothorax.  Bones appear intact.  Moderate degenerative changes and moderate atherosclerotic disease.  Pacing wires are unchanged.  Spine stimulator noted on current exam  Impression: No acute process seen.    Electronically signed by: Miah Trujillo MD  Date:    03/19/2025  Time:    08:08       Physical Exam   LOWER EXTREMITY PHYSICAL EXAMINATION    Vascular:  The right dorsalis pedis pulse 2/4 and the right posterior tibial pulse 2/4.  The left dorsalis pedis pulse 2/4 and posterior tibial pulse on the left is 2/4.  Capillary refill is intact.  Pedal hair growth intact.  Venous insufficiency noted to the bilateral lower extremities    Musculoskeletal: Manual Muscle Testing is 5/5 with dorsiflexion, plantar flexion, abduction, and adduction.  Arthritic spurring present to the dorsal aspect of the midfoot bilaterally.      Dermatological:  Skin is thin, shiny, and atrophic.  Fat pad atrophy noted.  Previous ulcer to the posterior aspect of the right heel appears to be healed.  There is no signs of acute infection.  No signs of open ulcerations.        Assessment:     1. History of foot ulcer    2. Fat pad atrophy of foot    3. Idiopathic peripheral neuropathy        Plan:     History of foot ulcer    Fat pad atrophy of foot    Idiopathic peripheral neuropathy    Thorough discussion is had with the patient today, concerning the diagnosis, its etiology, and the treatment algorithm at present.    Thorough discussion is had with the patient concerning the diagnosis, its etiology, and the treatment algorithm at present. Shoe inspection.  Foot Education. Patient reminded of the importance of good nutrition and blood sugar control to help prevent podiatric complications of diabetes. Patient instructed on proper foot hygeine. We discussed wearing proper and supportive shoe gear, daily foot inspections, never walking barefooted or sock footed, never putting sharp instruments to feet which can cause major complications associated with infection, ulcers, lacerations.      Keep heel suspended from surfaces such as beds, chairs, and reduce friction while in shoes to prevent recurrent wound development.    Continue to recommend follow-up with pain management in regards to his chronic pain syndrome and peripheral neuropathy symptoms.      Future Appointments   Date Time Provider Department Center   6/10/2025 10:00 AM Artur Simpson MD [facility] [place]   2026  9:30 AM Rubens Chavarria MD ON UROLOGY  [facility]             [1]   Social History  Socioeconomic History    Marital status:    Tobacco Use    Smoking status: Former     Current packs/day: 0.00     Average packs/day: 0.3 packs/day for 50.0 years (12.5 ttl pk-yrs)     Types: Cigarettes     Start date: 1956     Quit date: 2006     Years since quittin.9    Smokeless tobacco: Never   Substance and Sexual Activity    Alcohol use: Yes     Comment: OCC    Drug use: No     Social Drivers of Health     Financial Resource Strain: Medium Risk (2025)    Overall Financial Resource Strain (CARDIA)     Difficulty of Paying Living Expenses: Somewhat hard   Food Insecurity: No Food Insecurity (2025)    Hunger Vital Sign     Worried About Running Out of Food in the Last Year: Never true     Ran Out of Food in the Last Year: Never true   Transportation Needs: No Transportation Needs (2025)    PRAPARE - Transportation     Lack of Transportation (Medical): No     Lack of Transportation (Non-Medical): No   Physical Activity: Sufficiently Active (2025)    Exercise Vital Sign     Days  of Exercise per Week: 6 days     Minutes of Exercise per Session: 70 min   Housing Stability: Low Risk  (4/8/2025)    Housing Stability Vital Sign     Unable to Pay for Housing in the Last Year: No     Number of Times Moved in the Last Year: 0     Homeless in the Last Year: No

## 2025-04-24 NOTE — PROGRESS NOTES
"Outpatient Care Management  Plan of Care Follow Up Visit    Patient: Aston Avendano  MRN: [id number]  Date of Service: 04/16/2025  Completed by: Cynthia Cooley RN  Referral Date:     Reason for Visit   Patient presents with    OPCM RN Follow Up Call       Brief Summary:   Mr. Avendano is a 85 year old gentleman with chronic pain to his back, knees and feet. He has a spinal stimulator and pain pump. He recently had his spinal stimulator turned off, he did not feel it was helping and may have been contributing to his urinary frequency. He has a pain management doctor, Dr. Jeff Monzon who did prescribe oxycodone for him but patient has been reluctant to take it; he is worried about dependence. Mr. Avendano states that the Neurontin makes him feel "fuzzy and blurs his vision".   Next Steps:   Follow up on medication plan  Discuss non pharmacological methods for pain relief.  Reassess bowel plan, sleep pattern.   Mr. Avendano agreed to OPCM RN follow up on or around 4/23/2025.    "

## 2025-04-25 ENCOUNTER — OUTPATIENT CASE MANAGEMENT (OUTPATIENT)
Dept: ADMINISTRATIVE | Facility: OTHER | Age: 85
End: 2025-04-25
Payer: MEDICARE

## 2025-04-28 ENCOUNTER — EXTERNAL HOME HEALTH (OUTPATIENT)
Dept: HOME HEALTH SERVICES | Facility: HOSPITAL | Age: 85
End: 2025-04-28
Payer: MEDICARE

## 2025-05-15 ENCOUNTER — DOCUMENT SCAN (OUTPATIENT)
Dept: HOME HEALTH SERVICES | Facility: HOSPITAL | Age: 85
End: 2025-05-15
Payer: MEDICARE

## 2025-05-20 ENCOUNTER — OUTPATIENT CASE MANAGEMENT (OUTPATIENT)
Dept: ADMINISTRATIVE | Facility: OTHER | Age: 85
End: 2025-05-20
Payer: MEDICARE

## 2025-05-20 NOTE — LETTER
Aston Avendano  [id number] [facility] [id number]      Dear Aston Avendano,     I am your nurse with [facility]s Outpatient Care Management Department. I was unsuccessful in reaching you today. At your earliest convenience, I would like to discuss your healthcare progress.      Please contact me at [phone] from 8:00AM to 4:30 PM on Monday thru Friday.     As you know, [facility] is a program offered to you through [facility] where a nurse is available 24/7 to answer questions or provide medical advice, their number is [phone].    Thanks,      Cynthia Cooley RN  Outpatient Care Management

## 2025-05-20 NOTE — PROGRESS NOTES
Outpatient Care Management  Plan of Care Follow Up Visit    Patient: Aston Avendano  MRN: [id number]  Date of Service: 05/20/2025  Completed by: Cynthia Cooley RN  Referral Date:     Reason for Visit   Patient presents with    OPCM RN Follow Up Call     5/20/2025  1st attempt to complete Follow-Up  for Outpatient Care Management, left message.  Will send via [employer] -  unable to assess letter.

## 2025-05-28 ENCOUNTER — OUTPATIENT CASE MANAGEMENT (OUTPATIENT)
Dept: ADMINISTRATIVE | Facility: OTHER | Age: 85
End: 2025-05-28

## 2025-06-06 ENCOUNTER — DOCUMENT SCAN (OUTPATIENT)
Dept: HOME HEALTH SERVICES | Facility: HOSPITAL | Age: 85
End: 2025-06-06
Payer: MEDICARE

## 2025-07-22 ENCOUNTER — OUTPATIENT CASE MANAGEMENT (OUTPATIENT)
Dept: ADMINISTRATIVE | Facility: OTHER | Age: 85
End: 2025-07-22
Payer: MEDICARE

## 2025-07-22 NOTE — PROGRESS NOTES
Outpatient Care Management  Plan of Care Follow Up Visit    Patient: Aston Avendano  MRN: [id number]  Date of Service: 05/28/2025  Completed by: Cynthia Cooley RN  Referral Date:     Reason for Visit   Patient presents with    OPCM RN Follow Up Call       Brief Summary:   Next Steps:      Winnie with [facility] called wanting to let Gretchen know the patient has declined scheduling. Winnie said she will fax the form as soon as the fax machine starts working.     [phone]

## 2025-07-23 NOTE — PROGRESS NOTES
Spoke to Mrs. Avendano, Mr. Avendano was busy at the time and will return call. Reminded her about his upcoming appointment with Neurology.   9/8/2025 @ 10:30am [facility] with Dr. Robert Bartlett.

## (undated) DEVICE — GOWN POLY REINF BRTH SLV XL

## (undated) DEVICE — SYR ONLY LUER LOCK 20CC

## (undated) DEVICE — CONTAINER SPECIMEN OR STER 4OZ

## (undated) DEVICE — BOWL STERILE LARGE 32OZ

## (undated) DEVICE — TOWEL OR DISP STRL BLUE 4/PK

## (undated) DEVICE — UROVIEW 2600/2800

## (undated) DEVICE — SET IRR URLGY 2LINE UNIV SPIKE

## (undated) DEVICE — SOL IRR NACL .9% 3000ML

## (undated) DEVICE — DRAPE T CYSTOSCOPY STERILE

## (undated) DEVICE — TRAY SKIN SCRUB WET PREMIUM

## (undated) DEVICE — GLOVE SURG BIOGEL LATEX SZ 7.5

## (undated) DEVICE — NDL INJETAK ADJ TIP 70CM

## (undated) DEVICE — SOL WATER STRL IRR 1000ML

## (undated) DEVICE — COVER LIGHT HANDLE 80/CA

## (undated) DEVICE — GOWN POLY REINF X-LONG XL

## (undated) DEVICE — GAUZE SPONGE 4X4 12PLY